# Patient Record
Sex: FEMALE | Race: WHITE | NOT HISPANIC OR LATINO | Employment: FULL TIME | URBAN - METROPOLITAN AREA
[De-identification: names, ages, dates, MRNs, and addresses within clinical notes are randomized per-mention and may not be internally consistent; named-entity substitution may affect disease eponyms.]

---

## 2017-01-01 LAB — EJECTION FRACTION: 58

## 2017-01-10 ENCOUNTER — TELEPHONE (OUTPATIENT)
Dept: OBGYN | Facility: CLINIC | Age: 32
End: 2017-01-10

## 2017-01-10 ENCOUNTER — OFFICE VISIT (OUTPATIENT)
Dept: FAMILY MEDICINE | Facility: CLINIC | Age: 32
End: 2017-01-10
Payer: COMMERCIAL

## 2017-01-10 VITALS
OXYGEN SATURATION: 100 % | WEIGHT: 131 LBS | HEIGHT: 65 IN | BODY MASS INDEX: 21.83 KG/M2 | SYSTOLIC BLOOD PRESSURE: 122 MMHG | TEMPERATURE: 98.5 F | DIASTOLIC BLOOD PRESSURE: 63 MMHG | HEART RATE: 101 BPM

## 2017-01-10 DIAGNOSIS — R19.5 LOOSE STOOLS: Primary | ICD-10-CM

## 2017-01-10 DIAGNOSIS — R10.11 ABDOMINAL PAIN, RIGHT UPPER QUADRANT: ICD-10-CM

## 2017-01-10 LAB
ALBUMIN SERPL-MCNC: 3.2 G/DL (ref 3.4–5)
ALP SERPL-CCNC: 63 U/L (ref 40–150)
ALT SERPL W P-5'-P-CCNC: 18 U/L (ref 0–50)
AST SERPL W P-5'-P-CCNC: 10 U/L (ref 0–45)
BILIRUB DIRECT SERPL-MCNC: 0.1 MG/DL (ref 0–0.2)
BILIRUB SERPL-MCNC: 0.3 MG/DL (ref 0.2–1.3)
ERYTHROCYTE [DISTWIDTH] IN BLOOD BY AUTOMATED COUNT: 13.4 % (ref 10–15)
HCT VFR BLD AUTO: 36.1 % (ref 35–47)
HGB BLD-MCNC: 12.4 G/DL (ref 11.7–15.7)
MCH RBC QN AUTO: 32.8 PG (ref 26.5–33)
MCHC RBC AUTO-ENTMCNC: 34.3 G/DL (ref 31.5–36.5)
MCV RBC AUTO: 96 FL (ref 78–100)
PLATELET # BLD AUTO: 209 10E9/L (ref 150–450)
PROT SERPL-MCNC: 7.2 G/DL (ref 6.8–8.8)
RBC # BLD AUTO: 3.78 10E12/L (ref 3.8–5.2)
WBC # BLD AUTO: 11.7 10E9/L (ref 4–11)

## 2017-01-10 PROCEDURE — 99214 OFFICE O/P EST MOD 30 MIN: CPT | Performed by: FAMILY MEDICINE

## 2017-01-10 PROCEDURE — 36415 COLL VENOUS BLD VENIPUNCTURE: CPT | Performed by: FAMILY MEDICINE

## 2017-01-10 PROCEDURE — 85027 COMPLETE CBC AUTOMATED: CPT | Performed by: FAMILY MEDICINE

## 2017-01-10 PROCEDURE — 86003 ALLG SPEC IGE CRUDE XTRC EA: CPT | Performed by: FAMILY MEDICINE

## 2017-01-10 PROCEDURE — 80076 HEPATIC FUNCTION PANEL: CPT | Performed by: FAMILY MEDICINE

## 2017-01-10 NOTE — TELEPHONE ENCOUNTER
Next 5 appointments (look out 90 days)     Jan 11, 2017 12:10 PM   MyChart OB Short with AFSHAN Conteh CNP   [facility]    [address] [facility] [address]   [phone]            Jan 19, 2017  7:30 AM   ESTABLISHED PRENATAL with Ezra Sapp MD   [facility]    [address] [facility] [address]   [phone]                Edilia Nicole APRN CNP at 1/2/2017  1:23 PM      Status: Signed         Expand All Collapse All    This could be her IBS acting up, could be from her meal over the weekend. Would recommend she see FP to evaluate. Thank you. Edilia CAVANAUGH CNP           This writer attempted to contact Mavis on 01/10/2017.    Was call answered?  No.  Left message on voicemail with information to call back to change her appt with AFSHAN Meza CNP tomorrow to see FP instead as ordered above and below. Explained Dr. Ceja does have same day appts today. Does not need to speak to RN.    If patient calls back, please Schedule appointment with primary care and cancel her appt with AFSHAN Meza CNP on 01-11-17.  If any questions can she can be transferred to speak to someone in [facility].    Also sent a MyChart msg to pt.     Yvonne Roman RN, BAN

## 2017-01-10 NOTE — TELEPHONE ENCOUNTER
----- Message from AFSHAN Conteh CNP sent at 1/10/2017  9:08 AM CST -----  Regarding: Appt Wednesday  Hey there,    This patient is scheduled for Wednesday at 12:10. In her last LiveActiont message, we had recommended she see FP for her bowel changes. Can someone please contact her and have her reschedule?    Thank you!    Edilia

## 2017-01-10 NOTE — PROGRESS NOTES
SUBJECTIVE:  Mavis Krishnamurthy, a 32 year old female scheduled an appointment to discuss the following issues:  Change in bm - orange and oil floats on top. Past week - bm changes. Seeing ob/gyn. Bright orange - NOT bloody. No changes in diet. Ate chicken strips at restaurant initially.   Three bm's past week - similar. A little nausea. Some right upper abdominal pain. No changes with bm but worse with certain position.  No fevers or chills. No sick contacts. No bm since 2 days. Usually more constipations. No stool softeners in past month. Appetite poor since pregnancy.  5 lbs weight loss. Limited dairy - lactose interance. No wheat issues in past.   Some fruits issues with rashes in past - oranges/grapefruits. Doing probiotics now recently. Taking MVI - gel cap.  History IBS. Some right side abdominal pain.   Pregnant at 23 weeks.    Past Medical History   Diagnosis Date     AR (allergic rhinitis)      Abnormal Pap smears      Mild dysplasia-2007     Abnormal Pap smear      resolved, colposcopy       Past Surgical History   Procedure Laterality Date     Exam of vagina,colposcopy       X -2     Mouth surgery  2009     [place] [facility]      Endoscopic balloon sinuplasty, optical tracking system endoscopic sinus surgery, combined  11/7/2013     Procedure: COMBINED ENDOSCOPIC BALLOON SINUPLASTY, OPTICAL TRACKING SYSTEM ENDOSCOPIC SINUS SURGERY;  Bilateral Endoscopic Ethmoidectomy, Maxillary Antrostomy, Frontal Sinusototmy with Navigation and Balloon and Propel Implants;  Surgeon: Vasquez Corona MD;  Location:  OR     Upper gi endoscopy       Colonoscopy with co2 insufflation N/A 6/18/2015     Procedure: COLONOSCOPY WITH CO2 INSUFFLATION;  Surgeon: Angel Thomas MD;  Location:  OR     Photorefractive keratectomy       Oct and Dec 2015       Family History   Problem Relation Age of Onset     Hypertension Mother      HEART DISEASE Father      open heart for endocarditis     Neurologic Disorder Father 16      "migraines     Alzheimer Disease Maternal Grandmother 80     Neurologic Disorder Paternal Grandmother      migraines unknown age     Neurologic Disorder Sister 20     migraines       Social History   Substance Use Topics     Smoking status: Never Smoker      Smokeless tobacco: Never Used     Alcohol Use: No       ROS:  OBJECTIVE:  /63 mmHg  Pulse 101  Temp(Src) 98.5  F (36.9  C) (Oral)  Ht 5' 4.5\" (1.638 m)  Wt 131 lb (59.421 kg)  BMI 22.15 kg/m2  SpO2 100%  LMP 08/03/2016 (Exact Date)  EXAM:  GENERAL APPEARANCE: healthy, alert and no distress  EYES: EOMI,  PERRL  HENT: ear canals and TM's normal and nose and mouth without ulcers or lesions  NECK: no adenopathy, no asymmetry, masses, or scars and thyroid normal to palpation  RESP: lungs clear to auscultation - no rales, rhonchi or wheezes  CV: regular rates and rhythm, normal S1 S2, no S3 or S4 and no murmur, click or rub -  ABDOMEN:  soft, nontender, no HSM or masses and bowel sounds normal  ABDOMEN: gravid.   MS: extremities normal- no gross deformities noted, no evidence of inflammation in joints, FROM in all extremities.  SKIN: no suspicious lesions or rashes  PSYCH: mentation appears normal and affect normal/bright    ASSESSMENT / PLAN:  (R19.5) Loose stools  (primary encounter diagnosis)  Comment: likely mild food interolance and ibs - complicated by pregnancy  Plan: Hepatic panel (Albumin, ALT, AST, Bili, Alk         Phos, TP), CBC with platelets, Allergy adult         food panel        Food diary. Continue mvi and eat slowly/keep well hydrated. Consider GI in put if a lot worse or weight loss. Emesis -intermittent and normal for patient. Agree with probiotics. Await labs. Expected course and warning signs reviewed. Call/email with questions/concerns. To ER if can't keep fluids down.    (R10.11) Abdominal pain, right upper quadrant  Comment: intermittent. Likely normal pregnacy- positional  Plan: Hepatic panel (Albumin, ALT, AST, Bili, Alk         " Phos, TP), CBC with platelets        Consider u/s if worse. Tylenol/positial changes prn. Expected course and warning signs reviewed. .Call/email with questions/concerns    Leo Ceja

## 2017-01-10 NOTE — NURSING NOTE
"Chief Complaint   Patient presents with     Rectal Problem       Initial /63 mmHg  Pulse 101  Temp(Src) 98.5  F (36.9  C) (Oral)  Ht 5' 4.5\" (1.638 m)  Wt 131 lb (59.421 kg)  BMI 22.15 kg/m2  SpO2 100%  LMP 08/03/2016 (Exact Date) Estimated body mass index is 22.15 kg/(m^2) as calculated from the following:    Height as of this encounter: 5' 4.5\" (1.638 m).    Weight as of this encounter: 131 lb (59.421 kg).  BP completed using cuff size: thang Mcnair CMA    "

## 2017-01-13 LAB
CLAM IGE QN: NORMAL KU(A)/L
CODFISH IGE QN: NORMAL KU(A)/L
CORN IGE QN: NORMAL KU(A)/L
COW MILK IGE QN: NORMAL KU/L
EGG WHITE IGE QN: NORMAL KU(A)/L
PEANUT IGE QN: NORMAL KU(A)/L
SCALLOP IGE QN: NORMAL KU(A)/L
SHRIMP IGE QN: NORMAL KU(A)/L
SOYBEAN IGE QN: NORMAL KU(A)/L
WALNUT IGE QN: NORMAL KU(A)/L
WHEAT IGE QN: NORMAL KU(A)/L

## 2017-01-19 ENCOUNTER — PRENATAL OFFICE VISIT (OUTPATIENT)
Dept: OBGYN | Facility: CLINIC | Age: 32
End: 2017-01-19
Payer: COMMERCIAL

## 2017-01-19 VITALS
TEMPERATURE: 97.5 F | WEIGHT: 134.6 LBS | HEART RATE: 70 BPM | SYSTOLIC BLOOD PRESSURE: 102 MMHG | OXYGEN SATURATION: 100 % | DIASTOLIC BLOOD PRESSURE: 59 MMHG | BODY MASS INDEX: 22.76 KG/M2

## 2017-01-19 DIAGNOSIS — Z34.80 SUPERVISION OF OTHER NORMAL PREGNANCY, ANTEPARTUM: ICD-10-CM

## 2017-01-19 DIAGNOSIS — Z34.80 SUPERVISION OF OTHER NORMAL PREGNANCY, ANTEPARTUM: Primary | ICD-10-CM

## 2017-01-19 DIAGNOSIS — R73.09 ABNORMAL GLUCOSE TOLERANCE TEST: Primary | ICD-10-CM

## 2017-01-19 LAB
GLUCOSE 1H P 50 G GLC PO SERPL-MCNC: 136 MG/DL (ref 60–129)
HGB BLD-MCNC: 12 G/DL (ref 11.7–15.7)

## 2017-01-19 PROCEDURE — 85018 HEMOGLOBIN: CPT | Performed by: NURSE PRACTITIONER

## 2017-01-19 PROCEDURE — 99207 ZZC PRENATAL VISIT: CPT | Performed by: OBSTETRICS & GYNECOLOGY

## 2017-01-19 PROCEDURE — 82950 GLUCOSE TEST: CPT | Performed by: NURSE PRACTITIONER

## 2017-01-19 PROCEDURE — 36415 COLL VENOUS BLD VENIPUNCTURE: CPT | Performed by: NURSE PRACTITIONER

## 2017-01-19 NOTE — NURSING NOTE
"Chief Complaint   Patient presents with     Prenatal Care     24w       Initial /59 mmHg  Pulse 70  Temp(Src) 97.5  F (36.4  C) (Oral)  Wt 134 lb 9.6 oz (61.054 kg)  SpO2 100%  LMP 08/03/2016 (Exact Date) Estimated body mass index is 22.76 kg/(m^2) as calculated from the following:    Height as of 1/10/17: 5' 4.5\" (1.638 m).    Weight as of this encounter: 134 lb 9.6 oz (61.054 kg).  BP completed using cuff size: thang Dotson CMA      "

## 2017-01-19 NOTE — MR AVS SNAPSHOT
After Visit Summary   1/19/2017    Mavis Krishnamurthy    MRN: [id number]           Patient Information     Date Of Birth          1985        Visit Information        Provider Department      1/19/2017 7:30 AM Ezra Sapp MD [facility]         Follow-ups after your visit        Who to contact     If you have questions or need follow up information about today's clinic visit or your schedule please contact [facility] directly at [phone].  Normal or non-critical lab and imaging results will be communicated to you by JumpCloudhart, letter or phone within 4 business days after the clinic has received the results. If you do not hear from us within 7 days, please contact the clinic through JumpCloudhart or phone. If you have a critical or abnormal lab result, we will notify you by phone as soon as possible.  Submit refill requests through TOMS Shoes or call your pharmacy and they will forward the refill request to us. Please allow 3 business days for your refill to be completed.          Additional Information About Your Visit        MyChart Information     T[employer] gives you secure access to your electronic health record. If you see a primary care provider, you can also send messages to your care team and make appointments. If you have questions, please call your primary care clinic.  If you do not have a primary care provider, please call [phone] and they will assist you.        Care EveryWhere ID     This is your Care EveryWhere ID. This could be used by other organizations to access your [facility] medical records  [id number]        Your Vitals Were     Pulse Temperature Pulse Oximetry Last Period          70 97.5  F (36.4  C) (Oral) 100% 08/03/2016 (Exact Date)         Blood Pressure from Last 3 Encounters:   01/19/17 102/59   01/10/17 122/63   12/21/16 115/64    Weight from Last 3 Encounters:   01/19/17 134 lb 9.6 oz (61.054 kg)   01/10/17 131 lb (59.421 kg)   12/21/16 130  lb 12.8 oz (59.33 kg)              Today, you had the following     No orders found for display       Primary Care Provider Office Phone # Fax #    Leo Ceja [phone]       [facility] [address] [facility] [address]        Thank you!     Thank you for choosing [facility]  for your care. Our goal is always to provide you with excellent care. Hearing back from our patients is one way we can continue to improve our services. Please take a few minutes to complete the written survey that you may receive in the mail after your visit with us. Thank you!             Your Updated Medication List - Protect others around you: Learn how to safely use, store and throw away your medicines at www.disposemymeds.org.          This list is accurate as of: 1/19/17  7:33 AM.  Always use your most recent med list.                   Brand Name Dispense Instructions for use    PRENATAL PO          PROBIOTIC ADVANCED Caps      Take 2 capsules by mouth daily

## 2017-01-19 NOTE — PROGRESS NOTES
Patient presents for routine prenatal visit at 24w1d.  Patient without complaint.   PE: See OB vitals  Body mass index is 22.76 kg/(m^2).    Questions asked and answered.    1 hour gtt today  Having some issues with leg edema at the end of the day  Ezra Sapp MD FACOG

## 2017-01-20 ENCOUNTER — MYC MEDICAL ADVICE (OUTPATIENT)
Dept: OBGYN | Facility: CLINIC | Age: 32
End: 2017-01-20

## 2017-01-25 DIAGNOSIS — R73.09 ABNORMAL GLUCOSE TOLERANCE TEST: ICD-10-CM

## 2017-01-25 PROCEDURE — 82951 GLUCOSE TOLERANCE TEST (GTT): CPT | Performed by: NURSE PRACTITIONER

## 2017-01-25 PROCEDURE — 82952 GTT-ADDED SAMPLES: CPT | Performed by: NURSE PRACTITIONER

## 2017-01-25 PROCEDURE — 36415 COLL VENOUS BLD VENIPUNCTURE: CPT | Performed by: NURSE PRACTITIONER

## 2017-01-26 LAB
GLUCOSE 1H P 100 G GLC PO SERPL-MCNC: 155 MG/DL (ref 60–179)
GLUCOSE 2H P 100 G GLC PO SERPL-MCNC: 116 MG/DL (ref 60–154)
GLUCOSE 3H P 100 G GLC PO SERPL-MCNC: 99 MG/DL (ref 60–139)
GLUCOSE P FAST SERPL-MCNC: 79 MG/DL (ref 60–94)

## 2017-02-10 ENCOUNTER — MYC MEDICAL ADVICE (OUTPATIENT)
Dept: OBGYN | Facility: CLINIC | Age: 32
End: 2017-02-10

## 2017-02-10 NOTE — TELEPHONE ENCOUNTER
Advised with Telephone Triage for Obstetrics & Gynecology Second Edition, Tuyet Samuels & Yas Roman RN, BAN

## 2017-02-13 ENCOUNTER — OFFICE VISIT (OUTPATIENT)
Dept: FAMILY MEDICINE | Facility: CLINIC | Age: 32
End: 2017-02-13
Payer: COMMERCIAL

## 2017-02-13 ENCOUNTER — TELEPHONE (OUTPATIENT)
Dept: NURSING | Facility: CLINIC | Age: 32
End: 2017-02-13

## 2017-02-13 VITALS
HEART RATE: 87 BPM | TEMPERATURE: 98 F | BODY MASS INDEX: 22.98 KG/M2 | SYSTOLIC BLOOD PRESSURE: 118 MMHG | DIASTOLIC BLOOD PRESSURE: 72 MMHG | OXYGEN SATURATION: 100 % | WEIGHT: 136 LBS

## 2017-02-13 DIAGNOSIS — M79.662 PAIN OF LEFT CALF: Primary | ICD-10-CM

## 2017-02-13 PROCEDURE — 99214 OFFICE O/P EST MOD 30 MIN: CPT | Performed by: FAMILY MEDICINE

## 2017-02-13 NOTE — NURSING NOTE
"Chief Complaint   Patient presents with     Pain     left calf     Vomiting     Headache       Initial /72  Pulse 87  Temp 98  F (36.7  C) (Oral)  Wt 136 lb (61.7 kg)  LMP 08/03/2016 (Exact Date)  SpO2 100%  BMI 22.98 kg/m2 Estimated body mass index is 22.98 kg/(m^2) as calculated from the following:    Height as of 1/10/17: 5' 4.5\" (1.638 m).    Weight as of this encounter: 136 lb (61.7 kg).  Medication Reconciliation: complete   Eusebia Mcnair CMA    "

## 2017-02-13 NOTE — PROGRESS NOTES
SUBJECTIVE:  Mavis Krishnamurthy, a 32 year old female scheduled an appointment to discuss the following issues:  On/off calf pain past month. Notified  ob/gyn.  Painful back of calf. No history dvt. No history baker's cyst. No major injury in past.   Lots of sitting for job. No chest pain or shortness of breath.   Compression socks and exercise and lots of water. Tylenol/ice.   Dull ache. Swelling on/off. Worse with certain positions.   No fevers or chills. Drinking lots of water and trying to get fruits/veggies in diet. No hip/back or foot pain.   No similar issues with 1st pregnancy.   Medical, social, surgical, and family histories reviewed.    ROS:    OBJECTIVE:  /72  Pulse 87  Temp 98  F (36.7  C) (Oral)  Wt 136 lb (61.7 kg)  LMP 08/03/2016 (Exact Date)  SpO2 100%  BMI 22.98 kg/m2  EXAM:  GENERAL APPEARANCE: healthy, alert and no distress  EYES: EOMI,  PERRL  HENT: ear canals and TM's normal and nose and mouth without ulcers or lesions  NECK: no adenopathy, no asymmetry, masses, or scars and thyroid normal to palpation  RESP: lungs clear to auscultation - no rales, rhonchi or wheezes  CV: regular rates and rhythm, normal S1 S2, no S3 or S4 and no murmur, click or rub -  ABDOMEN:  soft, nontender, no HSM or masses and bowel sounds normal  MS: extremities normal- no gross deformities noted, no evidence of inflammation in joints, FROM in all extremities.  MS: mild tenderness left upper calf. No swelling/cords noted and good RANGE OF MOTION .   SKIN: no suspicious lesions or rashes  NEURO: Normal strength and tone, sensory exam grossly normal, mentation intact and speech normal  PSYCH: mentation appears normal and affect normal/bright  LYMPHATICS: No inguinal nodes    ASSESSMENT / PLAN:  (M79.662) Pain of left calf  (primary encounter diagnosis)  Comment: likely strain/muscle cramp vs baker's cyst vs normal pregnancy compression vs dvt  Plan: US Lower Extremity Venous Duplex Left        U/s set-up.  Heat/ice and continue elevation/compression/tylenol. Will give T#3 if needed. To ER if a lot worse pain/swelling - (terry into thigh) or shortness of breath/ chest pain. Expected course and warning signs reviewed. Call/email with questions/concerns    Leo Ceja MD

## 2017-02-13 NOTE — MR AVS SNAPSHOT
After Visit Summary   2/13/2017    Mavis Krishnamurthy    MRN: [id number]           Patient Information     Date Of Birth          1985        Visit Information        Provider Department      2/13/2017 3:30 PM Leo Ceja MD [facility]        Today's Diagnoses     Pain of left calf    -  1       Follow-ups after your visit        Your next 10 appointments already scheduled     Feb 14, 2017  7:30 AM CST   US LOWER EXTREMITY VENOUS DUPLEX LEFT with ANDUS[facility]   [facility]    [address] [facility] [id number] [phone]           Please bring a list of your medicines (including vitamins, minerals and over-the-counter drugs). Also, tell your doctor about any allergies you may have. Wear comfortable clothes and leave your valuables at home.  You do not need to do anything special to prepare for your exam.  Please call the Imaging Department at your exam site with any questions.            Feb 17, 2017  7:30 AM CST   ESTABLISHED PRENATAL with Ezra Sapp MD   [facility]    [address] [facility] [id number] [phone]              Future tests that were ordered for you today     Open Future Orders        Priority Expected Expires Ordered    US Lower Extremity Venous Duplex Left Routine  2/13/2018 2/13/2017            Who to contact     If you have questions or need follow up information about today's clinic visit or your schedule please contact [facility] directly at [phone].  Normal or non-critical lab and imaging results will be communicated to you by MyChart, letter or phone within 4 business days after the clinic has received the results. If you do not hear from us within 7 days, please contact the clinic through MyChart or phone. If you have a critical or abnormal lab result, we will notify you by phone as soon as possible.  Submit refill requests through  [employer] Sheep or call your pharmacy and they will forward the refill request to us. Please allow 3 business days for your refill to be completed.          Additional Information About Your Visit        Sun LifeLighthart Information     F[employer] Sheep gives you secure access to your electronic health record. If you see a primary care provider, you can also send messages to your care team and make appointments. If you have questions, please call your primary care clinic.  If you do not have a primary care provider, please call [phone] and they will assist you.        Care EveryWhere ID     This is your Care EveryWhere ID. This could be used by other organizations to access your [place] medical records  [id number]        Your Vitals Were     Pulse Temperature Last Period Pulse Oximetry BMI (Body Mass Index)       87 98  F (36.7  C) (Oral) 08/03/2016 (Exact Date) 100% 22.98 kg/m2        Blood Pressure from Last 3 Encounters:   02/13/17 118/72   01/19/17 102/59   01/10/17 122/63    Weight from Last 3 Encounters:   02/13/17 136 lb (61.7 kg)   01/19/17 134 lb 9.6 oz (61.1 kg)   01/10/17 131 lb (59.4 kg)               Primary Care Provider Office Phone # Fax #    Leo Ceja [phone]       [facility] [address] [facility] [address]        Thank you!     Thank you for choosing [facility]  for your care. Our goal is always to provide you with excellent care. Hearing back from our patients is one way we can continue to improve our services. Please take a few minutes to complete the written survey that you may receive in the mail after your visit with us. Thank you!             Your Updated Medication List - Protect others around you: Learn how to safely use, store and throw away your medicines at www.disposemymeds.org.          This list is accurate as of: 2/13/17  4:10 PM.  Always use your most recent med list.                   Brand Name Dispense Instructions for use    PRENATAL  PO          PROBIOTIC ADVANCED Caps      Take 2 capsules by mouth daily

## 2017-02-13 NOTE — TELEPHONE ENCOUNTER
"Call Type: Triage Call    Presenting Problem: Mavis's  \"left calf is hurting and is swelling.\"  Symptoms are keeping Mavis up at night.   [facility] Triage/Leg  Non-Injury/disposition is to be seen within 24 hours and Mavis  agreed.  Triage Note:  Guideline Title: Leg Non-Injury  Recommended Disposition: See Provider within 24 hours  Original Inclination: Wanted to speak with a nurse  Override Disposition:  Intended Action: Call PCP/HCP  Physician Contacted: No  New swelling of legs that does NOT resolve with rest and elevation of legs ?  YES  Orthopedic hardware (metal plate, brigid or screw) newly bulging under or through  skin ? NO  Gradual onset or worsening weakness/paralysis OR inability to purposely move ? NO  Gradual onset or worsening numbness/tingling ? NO  Generalized muscle pain or aching ? NO  New marked swelling (twice the normal size as compared to usual appearance) ? NO  Painful spasms or cramping of large muscle groups (back, legs or abdomen) AND  recent heat exposure ? NO  New onset of severe pain AND change in sensation (numb, tingling, or no feeling),  change in color (pale or blue), feels cool to touch compared to other extremity.  ? NO  New onset of inability to take unassisted weight-bearing steps ? NO  Sudden onset of shortness of breath, chest pain and cough with blood tinged sputum  ? NO  New, painful cord-like swelling in calf or thigh of the leg; cord-like swelling  may feel warm or look red or discolored. ? NO  New or worsening one-sided leg swelling with pain that may be described as achy;  pain may worsen with standing or walking. ? NO  New onset of unbearable pain within last 24 hours ? NO  Extremity swelling or limitation of range of motion AND known bleeding disorder OR  taking blood thinner, chemotherapy or transplant medications ? NO  Any new OR worsening signs and symptoms of soft tissue infection ? NO  Any signs and symptoms of soft tissue infection that have not improved with " 48  hours of medical care ? NO  New unexplained weakness/paralysis, change in sensation (numbness or tingling) or  inability to purposely move, especially when one side of body is involved  occurring now or within last 4 hours ? NO  Physician Instructions:  Care Advice: Call provider if symptoms worsen or new symptoms develop.  Position affected part so it is elevated at least 12 inches (30 cm) above  level of heart to improve circulation and decrease discomfort.  CAUTIONS  SYMPTOM / CONDITION MANAGEMENT  List, or take, all current prescription(s), nonprescription or alternative  medication(s) to provider for evaluation.  LEG CARE: - Avoid prolonged sitting or standing  take a break to move around every hour or so. - Keep legs raised when  sitting, resting or sleeping  when possible raise legs above level of the heart for 20 -30 minutes. -  Flex and extend ankles for 10-12 repetitions every hour if sitting. - Do  not cross your legs. - Wear loose, non-restrictive clothing, especially  around waist, groin area and legs. - Consider using support hose if  recommended by your provider.

## 2017-02-14 ENCOUNTER — RADIANT APPOINTMENT (OUTPATIENT)
Dept: ULTRASOUND IMAGING | Facility: CLINIC | Age: 32
End: 2017-02-14
Attending: FAMILY MEDICINE
Payer: COMMERCIAL

## 2017-02-14 DIAGNOSIS — M79.662 PAIN OF LEFT CALF: ICD-10-CM

## 2017-02-14 PROCEDURE — 93971 EXTREMITY STUDY: CPT | Mod: LT

## 2017-02-17 ENCOUNTER — PRENATAL OFFICE VISIT (OUTPATIENT)
Dept: OBGYN | Facility: CLINIC | Age: 32
End: 2017-02-17
Payer: COMMERCIAL

## 2017-02-17 VITALS
HEART RATE: 82 BPM | TEMPERATURE: 97 F | OXYGEN SATURATION: 100 % | SYSTOLIC BLOOD PRESSURE: 114 MMHG | WEIGHT: 137 LBS | BODY MASS INDEX: 23.15 KG/M2 | DIASTOLIC BLOOD PRESSURE: 69 MMHG

## 2017-02-17 DIAGNOSIS — Z23 NEED FOR TDAP VACCINATION: ICD-10-CM

## 2017-02-17 DIAGNOSIS — Z34.80 SUPERVISION OF OTHER NORMAL PREGNANCY, ANTEPARTUM: Primary | ICD-10-CM

## 2017-02-17 PROCEDURE — 90471 IMMUNIZATION ADMIN: CPT | Performed by: OBSTETRICS & GYNECOLOGY

## 2017-02-17 PROCEDURE — 90715 TDAP VACCINE 7 YRS/> IM: CPT | Performed by: OBSTETRICS & GYNECOLOGY

## 2017-02-17 PROCEDURE — 99207 ZZC PRENATAL VISIT: CPT | Performed by: OBSTETRICS & GYNECOLOGY

## 2017-02-17 NOTE — PATIENT INSTRUCTIONS
If you have any questions regarding your visit, Please contact your care team.    Women s Health CLINIC HOURS TELEPHONE NUMBER   MD Page Santoyo CMA Lisa -    MATTIE Gomez RN       Monday:       7:30-4:30 [place]  Wednesday:       7:30-4:30 [place]  Thursday:       7:30-1:30 [place]  Friday:       7:30-11:30 [facility]  [address] [place]  [address]  [phone] ask for [facility]  [address] [place] [address]  [phone] ask for [facility]    Imaging Scheduling for [place]:  [phone]    Imaging Scheduling for [place]: [phone]       Urgent Care locations:    [facility] Saturday and Sunday   9 am - 5 pm    Monday-Friday   12 pm - 8 pm  Saturday and Sunday   9 am - 5 pm   [phone]     [facility] Labor and Delivery:  [phone]    If you need a medication refill, please contact your pharmacy. Please allow 3 business days for your refill to be completed.  As always, Thank you for trusting us with your healthcare needs!

## 2017-02-17 NOTE — PROGRESS NOTES
TDAP (BOOSTRIX AGES 10-64)      Date Status Dose VIS Date Route Site  Lot# Given By Verified By     2/17/2017 Given 0.5 mL 02/24/2015, Given Today IM LD [employer] [id number] Page Dotson MA --         Exp. Date NDC # Product Time Location External Comment     2/16/2019   --  --      Updated by: Page Dotson MA on 2/17/2017  7:55 AM

## 2017-02-17 NOTE — MR AVS SNAPSHOT
After Visit Summary   2/17/2017    Mavis Krishnamurthy    MRN: [id number]           Patient Information     Date Of Birth          1985        Visit Information        Provider Department      2/17/2017 7:30 AM Ezra Sapp MD [facility]        Today's Diagnoses     Supervision of other normal pregnancy, antepartum    -  1    Need for Tdap vaccination          Care Instructions                                                         If you have any questions regarding your visit, Please contact your care team.    Women s Health CLINIC HOURS TELEPHONE NUMBER   MD Page Santoyo CMA Lisa -    MATTIE Gomez RN       Monday:       7:30-4:30 [place]  Wednesday:       7:30-4:30 [place]  Thursday:       7:30-1:30 [place]  Friday:       7:30-11:30 [facility]  [address] [facility] [place]  [address]  [phone] ask for [facility]  [address] [place] [address]  [phone] ask for [facility]    Imaging Scheduling for [place]:  [phone]    Imaging Scheduling for [place]: [phone]       Urgent Care locations:    [facility] Saturday and Sunday   9 am - 5 pm    Monday-Friday   12 pm - 8 pm  Saturday and Sunday   9 am - 5 pm   [phone]     [facility] Labor and Delivery:  [phone]    If you need a medication refill, please contact your pharmacy. Please allow 3 business days for your refill to be completed.  As always, Thank you for trusting us with your healthcare needs!            Follow-ups after your visit        Follow-up notes from your care team     Return in about 2 weeks (around 3/3/2017) for Prenatal Visit.      Who to contact     If you have questions or need follow up information about today's clinic visit or your schedule please contact [facility] directly at [phone].  Normal or  non-critical lab and imaging results will be communicated to you by MyChart, letter or phone within 4 business days after the clinic has received the results. If you do not hear from us within 7 days, please contact the clinic through EARTHNET or phone. If you have a critical or abnormal lab result, we will notify you by phone as soon as possible.  Submit refill requests through EARTHNET or call your pharmacy and they will forward the refill request to us. Please allow 3 business days for your refill to be completed.          Additional Information About Your Visit        EARTHNET Information     EARTHNET gives you secure access to your electronic health record. If you see a primary care provider, you can also send messages to your care team and make appointments. If you have questions, please call your primary care clinic.  If you do not have a primary care provider, please call [phone] and they will assist you.        Care EveryWhere ID     This is your Care EveryWhere ID. This could be used by other organizations to access your [place] medical records  [id number]        Your Vitals Were     Pulse Temperature Last Period Pulse Oximetry BMI (Body Mass Index)       82 97  F (36.1  C) (Oral) 08/03/2016 (Exact Date) 100% 23.15 kg/m2        Blood Pressure from Last 3 Encounters:   02/17/17 114/69   02/13/17 118/72   01/19/17 102/59    Weight from Last 3 Encounters:   02/17/17 137 lb (62.1 kg)   02/13/17 136 lb (61.7 kg)   01/19/17 134 lb 9.6 oz (61.1 kg)              We Performed the Following     TDAP VACCINE (BOOSTRIX AGES 10-64)        Primary Care Provider Office Phone # Fax #    Leo Ceja [phone]       [facility] [address] [facility] [address]        Thank you!     Thank you for choosing [facility]  for your care. Our goal is always to provide you with excellent care. Hearing back from our patients is one way we can continue to improve our  services. Please take a few minutes to complete the written survey that you may receive in the mail after your visit with us. Thank you!             Your Updated Medication List - Protect others around you: Learn how to safely use, store and throw away your medicines at www.disposemymeds.org.          This list is accurate as of: 2/17/17  7:57 AM.  Always use your most recent med list.                   Brand Name Dispense Instructions for use    PRENATAL PO          PROBIOTIC ADVANCED Caps      Take 2 capsules by mouth daily

## 2017-02-17 NOTE — PROGRESS NOTES
Patient presents for routine prenatal visit at 28w2d.  Patient without complaint.   PE: See OB vitals  Body mass index is 23.15 kg/(m^2).    Questions asked and answered.    TDAP today  Still having left sided LE edema  Ezra Sapp MD FACOG

## 2017-02-17 NOTE — NURSING NOTE
"Chief Complaint   Patient presents with     Prenatal Care     28w2d       Initial /69 (BP Location: Right arm, Patient Position: Chair, Cuff Size: Adult Regular)  Pulse 82  Temp 97  F (36.1  C) (Oral)  Wt 137 lb (62.1 kg)  LMP 08/03/2016 (Exact Date)  SpO2 100%  BMI 23.15 kg/m2 Estimated body mass index is 23.15 kg/(m^2) as calculated from the following:    Height as of 1/10/17: 5' 4.5\" (1.638 m).    Weight as of this encounter: 137 lb (62.1 kg).  Medication Reconciliation: complete   Page Dotson CMA    Screening Questionnaire for Adult Immunization    Are you sick today?   No   Do you have allergies to medications, food, a vaccine component or latex?   No   Have you ever had a serious reaction after receiving a vaccination?   No   Do you have a long-term health problem with heart disease, lung disease, asthma, kidney disease, metabolic disease (e.g. diabetes), anemia, or other blood disorder?   Yes   Do you have cancer, leukemia, HIV/AIDS, or any other immune system problem?   No   In the past 3 months, have you taken medications that affect  your immune system, such as prednisone, other steroids, or anticancer drugs; drugs for the treatment of rheumatoid arthritis, Crohn s disease, or psoriasis; or have you had radiation treatments?   No   Have you had a seizure, or a brain or other nervous system problem?   No   During the past year, have you received a transfusion of blood or blood     products, or been given immune (gamma) globulin or antiviral drug?   No   For women: Are you pregnant or is there a chance you could become        pregnant during the next month?   Yes   Have you received any vaccinations in the past 4 weeks?   No     Immunization questionnaire was positive for at least one answer.  Notified Dr. Sapp.      MNVFC doesn't apply on this patient    Per orders of Dr. Sapp, injection of TDAP given by Page Dotson. Patient instructed to remain in clinic for 20 minutes " afterwards, and to report any adverse reaction to me immediately.       Screening performed by Page Dotson on 2/17/2017 at 7:35 AM.

## 2017-03-01 ENCOUNTER — PRENATAL OFFICE VISIT (OUTPATIENT)
Dept: OBGYN | Facility: CLINIC | Age: 32
End: 2017-03-01
Payer: COMMERCIAL

## 2017-03-01 VITALS
TEMPERATURE: 98 F | DIASTOLIC BLOOD PRESSURE: 68 MMHG | BODY MASS INDEX: 23.36 KG/M2 | WEIGHT: 140.2 LBS | SYSTOLIC BLOOD PRESSURE: 116 MMHG | HEART RATE: 73 BPM | HEIGHT: 65 IN

## 2017-03-01 DIAGNOSIS — Z34.80 SUPERVISION OF OTHER NORMAL PREGNANCY, ANTEPARTUM: Primary | ICD-10-CM

## 2017-03-01 PROCEDURE — 99207 ZZC PRENATAL VISIT: CPT | Performed by: NURSE PRACTITIONER

## 2017-03-01 RX ORDER — BREAST PUMP
1 EACH MISCELLANEOUS PRN
Qty: 1 EACH | Refills: 0 | Status: SHIPPED | OUTPATIENT
Start: 2017-03-01 | End: 2017-07-13

## 2017-03-01 ASSESSMENT — PAIN SCALES - GENERAL: PAINLEVEL: MILD PAIN (3)

## 2017-03-01 NOTE — MR AVS SNAPSHOT
After Visit Summary   3/1/2017    Mavis Krishnamurthy    MRN: [id number]           Patient Information     Date Of Birth          1985        Visit Information        Provider Department      3/1/2017 4:10 PM Edilia Nicole APRN CNP [facility]        Today's Diagnoses     Supervision of other normal pregnancy, antepartum    -  1       Follow-ups after your visit        Your next 10 appointments already scheduled     Mar 16, 2017  8:00 AM CDT   ESTABLISHED PRENATAL with Ezra Sapp MD   [facility] ([facility])    [address] [facility] [address] [phone]            Mar 30, 2017  7:30 AM CDT   ESTABLISHED PRENATAL with Ezra Sapp MD   [facility] ([facility])    [address] [facility] [address] [phone]              Who to contact     If you have questions or need follow up information about today's clinic visit or your schedule please contact [facility] directly at [phone].  Normal or non-critical lab and imaging results will be communicated to you by MeetingSense Softwarehart, letter or phone within 4 business days after the clinic has received the results. If you do not hear from us within 7 days, please contact the clinic through Senict or phone. If you have a critical or abnormal lab result, we will notify you by phone as soon as possible.  Submit refill requests through BCNX or call your pharmacy and they will forward the refill request to us. Please allow 3 business days for your refill to be completed.          Additional Information About Your Visit        MeetingSense Softwarehart Information     BCNX gives you secure access to your electronic health record. If you see a primary care provider, you can also send messages to your care team and make appointments. If you have questions, please call your primary care clinic.  If you do not have a primary care provider, please call  "[phone] and they will assist you.        Care EveryWhere ID     This is your Care EveryWhere ID. This could be used by other organizations to access your [place] medical records  [id number]        Your Vitals Were     Pulse Temperature Height Last Period BMI (Body Mass Index)       73 98  F (36.7  C) (Oral) 5' 5\" (1.651 m) 08/03/2016 (Exact Date) 23.33 kg/m2        Blood Pressure from Last 3 Encounters:   03/01/17 116/68   02/17/17 114/69   02/13/17 118/72    Weight from Last 3 Encounters:   03/01/17 140 lb 3.2 oz (63.6 kg)   02/17/17 137 lb (62.1 kg)   02/13/17 136 lb (61.7 kg)              Today, you had the following     No orders found for display         Today's Medication Changes          These changes are accurate as of: 3/1/17  4:19 PM.  If you have any questions, ask your nurse or doctor.               Start taking these medicines.        Dose/Directions    breast pump Misc   Used for:  Supervision of other normal pregnancy, antepartum   Started by:  Edilia Nicole APRN CNP        Dose:  1 each   1 each as needed   Quantity:  1 each   Refills:  0            Where to get your medicines      Some of these will need a paper prescription and others can be bought over the counter.  Ask your nurse if you have questions.     Bring a paper prescription for each of these medications     breast pump Misc                Primary Care Provider Office Phone # Fax #    Leo Ceja [phone]       [facility] [address] [facility] [address]        Thank you!     Thank you for choosing [facility]  for your care. Our goal is always to provide you with excellent care. Hearing back from our patients is one way we can continue to improve our services. Please take a few minutes to complete the written survey that you may receive in the mail after your visit with us. Thank you!             Your Updated Medication List - Protect others around you: Learn how " to safely use, store and throw away your medicines at www.disposemymeds.org.          This list is accurate as of: 3/1/17  4:19 PM.  Always use your most recent med list.                   Brand Name Dispense Instructions for use    breast pump Misc     1 each    1 each as needed       PRENATAL PO          PROBIOTIC ADVANCED Caps      Take 2 capsules by mouth daily

## 2017-03-01 NOTE — PROGRESS NOTES
Patient presents for routine prenatal visit. Prenatal flowsheet reviewed and updated as needed.  Denies vaginal bleeding, loss of fluid, contractions or cramping.  Patient without complaint. Prescription for breast pump given.  Advice as per Anticipatory Guidance/Checklist updated.  PE: See OB vitals    Questions asked and answered. Next OB visit in 2 week(s) with Dr. Sapp.    Edilia CAVANAUGH CNP

## 2017-03-01 NOTE — NURSING NOTE
"Chief Complaint   Patient presents with     Prenatal Care     30w0d       Initial /68  Pulse 73  Temp 98  F (36.7  C) (Oral)  Ht 5' 5\" (1.651 m)  Wt 140 lb 3.2 oz (63.6 kg)  LMP 08/03/2016 (Exact Date)  BMI 23.33 kg/m2 Estimated body mass index is 23.33 kg/(m^2) as calculated from the following:    Height as of this encounter: 5' 5\" (1.651 m).    Weight as of this encounter: 140 lb 3.2 oz (63.6 kg)..  BP completed using cuff size: thang Sellers CMA    "

## 2017-03-13 ENCOUNTER — MYC MEDICAL ADVICE (OUTPATIENT)
Dept: OBGYN | Facility: CLINIC | Age: 32
End: 2017-03-13

## 2017-03-13 NOTE — TELEPHONE ENCOUNTER
Reviewed meds that are on [facility] handout Taking Medicine during Pregnancy. Unsure if those listed would be stronger than Zantac.   Will route to AFSHAN Meza, CNP for review & orders. Yvonne Roman RN, BAN

## 2017-03-16 ENCOUNTER — PRENATAL OFFICE VISIT (OUTPATIENT)
Dept: OBGYN | Facility: CLINIC | Age: 32
End: 2017-03-16
Payer: COMMERCIAL

## 2017-03-16 VITALS
WEIGHT: 140.6 LBS | BODY MASS INDEX: 23.4 KG/M2 | DIASTOLIC BLOOD PRESSURE: 69 MMHG | HEART RATE: 97 BPM | OXYGEN SATURATION: 100 % | SYSTOLIC BLOOD PRESSURE: 115 MMHG

## 2017-03-16 DIAGNOSIS — Q23.81 BICUSPID AORTIC VALVE: ICD-10-CM

## 2017-03-16 DIAGNOSIS — Z34.80 SUPERVISION OF OTHER NORMAL PREGNANCY, ANTEPARTUM: Primary | ICD-10-CM

## 2017-03-16 PROCEDURE — 99207 ZZC PRENATAL VISIT: CPT | Performed by: OBSTETRICS & GYNECOLOGY

## 2017-03-16 NOTE — NURSING NOTE
"Chief Complaint   Patient presents with     Prenatal Care     32.1 weeks       Initial /69 (BP Location: Left arm, Cuff Size: Adult Regular)  Pulse 97  Wt 140 lb 9.6 oz (63.8 kg)  LMP 08/03/2016 (Exact Date)  SpO2 100%  BMI 23.4 kg/m2 Estimated body mass index is 23.4 kg/(m^2) as calculated from the following:    Height as of 3/1/17: 5' 5\" (1.651 m).    Weight as of this encounter: 140 lb 9.6 oz (63.8 kg).  Medication Reconciliation: complete   PRESTON Chávez 3/16/2017         "

## 2017-03-16 NOTE — MR AVS SNAPSHOT
After Visit Summary   3/16/2017    Mavis Krishnamurthy    MRN: [id number]           Patient Information     Date Of Birth          1985        Visit Information        Provider Department      3/16/2017 8:00 AM Ezra Sapp MD [facility]        Today's Diagnoses     Supervision of other normal pregnancy, antepartum    -  1    Bicuspid aortic valve           Follow-ups after your visit        Follow-up notes from your care team     Return in about 2 weeks (around 3/30/2017) for Prenatal Visit.      Your next 10 appointments already scheduled     Mar 30, 2017  7:30 AM CDT   ESTABLISHED PRENATAL with Ezra Sapp MD   [facility]    [address] [facility] [address] [phone]            Apr 14, 2017  8:00 AM CDT   MyChart OB Short with Ezra Sapp MD   [facility] ([facility])    [address] [facility] [address] [phone]              Who to contact     If you have questions or need follow up information about today's clinic visit or your schedule please contact [facility] directly at [phone].  Normal or non-critical lab and imaging results will be communicated to you by MyChart, letter or phone within 4 business days after the clinic has received the results. If you do not hear from us within 7 days, please contact the clinic through Striivhart or phone. If you have a critical or abnormal lab result, we will notify you by phone as soon as possible.  Submit refill requests through Pictela or call your pharmacy and they will forward the refill request to us. Please allow 3 business days for your refill to be completed.          Additional Information About Your Visit        MyChart Information     Pictela gives you secure access to your electronic health record. If you see a primary care provider, you can also send messages to your care team and make appointments.  If you have questions, please call your primary care clinic.  If you do not have a primary care provider, please call [phone] and they will assist you.        Care EveryWhere ID     This is your Care EveryWhere ID. This could be used by other organizations to access your [place] medical records  [id number]        Your Vitals Were     Pulse Last Period Pulse Oximetry BMI (Body Mass Index)          97 08/03/2016 (Exact Date) 100% 23.4 kg/m2         Blood Pressure from Last 3 Encounters:   03/16/17 115/69   03/01/17 116/68   02/17/17 114/69    Weight from Last 3 Encounters:   03/16/17 140 lb 9.6 oz (63.8 kg)   03/01/17 140 lb 3.2 oz (63.6 kg)   02/17/17 137 lb (62.1 kg)              Today, you had the following     No orders found for display       Primary Care Provider Office Phone # Fax #    Leo Ceja [phone]       [facility] [address] [facility] [address]        Thank you!     Thank you for choosing [facility]  for your care. Our goal is always to provide you with excellent care. Hearing back from our patients is one way we can continue to improve our services. Please take a few minutes to complete the written survey that you may receive in the mail after your visit with us. Thank you!             Your Updated Medication List - Protect others around you: Learn how to safely use, store and throw away your medicines at www.disposemymeds.org.          This list is accurate as of: 3/16/17  8:19 AM.  Always use your most recent med list.                   Brand Name Dispense Instructions for use    breast pump Misc     1 each    1 each as needed       PRENATAL PO          PROBIOTIC ADVANCED Caps      Take 2 capsules by mouth daily       ZANTAC PO      Take 150 mg by mouth

## 2017-03-16 NOTE — PROGRESS NOTES
Patient presents for routine prenatal visit at 32w1d.  Patient without complaint. Has had some decreased movement, discussed kick counts  PE: See OB vitals  There is no height or weight on file to calculate BMI.    Questions asked and answered.    Ezra Sapp MD FACOG

## 2017-03-30 ENCOUNTER — PRENATAL OFFICE VISIT (OUTPATIENT)
Dept: OBGYN | Facility: CLINIC | Age: 32
End: 2017-03-30
Payer: COMMERCIAL

## 2017-03-30 VITALS
TEMPERATURE: 97.7 F | WEIGHT: 139 LBS | SYSTOLIC BLOOD PRESSURE: 111 MMHG | DIASTOLIC BLOOD PRESSURE: 70 MMHG | HEART RATE: 97 BPM | BODY MASS INDEX: 23.13 KG/M2 | OXYGEN SATURATION: 99 %

## 2017-03-30 DIAGNOSIS — Z34.80 SUPERVISION OF OTHER NORMAL PREGNANCY, ANTEPARTUM: Primary | ICD-10-CM

## 2017-03-30 PROCEDURE — 99207 ZZC PRENATAL VISIT: CPT | Performed by: OBSTETRICS & GYNECOLOGY

## 2017-03-30 NOTE — MR AVS SNAPSHOT
After Visit Summary   3/30/2017    Mavis Krishnamurthy    MRN: [id number]           Patient Information     Date Of Birth          1985        Visit Information        Provider Department      3/30/2017 7:30 AM Ezra Sapp MD [facility]        Today's Diagnoses     Supervision of other normal pregnancy, antepartum    -  1      Care Instructions                                                         If you have any questions regarding your visit, Please contact your care team.    Women s Health CLINIC HOURS TELEPHONE NUMBER   MD Page Santoyo CMA Lisa -    MATTIE Gomez RN       Monday:       7:30-4:30 [place]  Wednesday:       7:30-4:30 [place]  Thursday:       7:30-1:30 [place]  Friday:       7:30-11:30 [facility]  [address] [place]  [address] [phone] ask for Women[facility]  [address]  [place], [place] [address] [phone] ask for [facility]    Imaging Scheduling for [place]:  [phone]    Imaging Scheduling for [place]: [phone]       Urgent Care locations:    [facility] Saturday and Sunday   9 am - 5 pm    Monday-Friday   12 pm - 8 pm  Saturday and Sunday   9 am - 5 pm   [phone]      Labor and Delivery:  [phone]    If you need a medication refill, please contact your pharmacy. Please allow 3 business days for your refill to be completed.  As always, Thank you for trusting us with your healthcare needs!            Follow-ups after your visit        Follow-up notes from your care team     Return in about 2 weeks (around 4/13/2017) for Prenatal Visit.      Your next 10 appointments already scheduled     Apr 14, 2017  8:00 AM CDT   [facility]    [address] [facility] [address]    [phone]            Apr 21, 2017  8:30 AM CDT   MyChart OB Short with Ezra Sapp MD   [facility]    [address]   [place] [address] [phone]            Apr 28, 2017  7:30 AM CDT   MyChart OB Short with Ezra Sapp MD   [facility]    [address]   [place] [address] [phone]            May 05, 2017  7:30 AM CDT   MyChart OB Short with Ezra Sapp MD   [facility]    [address]   [place] [address] [phone]            May 11, 2017  7:30 AM CDT   MyChart OB Short with Ezra Sapp MD   [facility]    [address]   [place] [address] [phone]              Who to contact     If you have questions or need follow up information about today's clinic visit or your schedule please contact [facility] directly at [phone].  Normal or non-critical lab and imaging results will be communicated to you by Kanvas Labshart, letter or phone within 4 business days after the clinic has received the results. If you do not hear from us within 7 days, please contact the clinic through "[employer]t or phone. If you have a critical or abnormal lab result, we will notify you by phone as soon as possible.  Submit refill requests through Splitforce or call your pharmacy and they will forward the refill request to us. Please allow 3 business days for your refill to be completed.          Additional Information About Your Visit        Kanvas LabsharSearchdaimon Information     Splitforce gives you secure access to your electronic health record. If you see a primary care provider, you can also send messages to your care team and make appointments. If you have questions, please call your primary care clinic.  If you do not have a primary care provider, please call [phone] and they will assist you.         Care EveryWhere ID     This is your Care EveryWhere ID. This could be used by other organizations to access your [place] medical records  [id number]        Your Vitals Were     Pulse Temperature Last Period Pulse Oximetry BMI (Body Mass Index)       97 97.7  F (36.5  C) (Oral) 08/03/2016 (Exact Date) 99% 23.13 kg/m2        Blood Pressure from Last 3 Encounters:   03/30/17 111/70   03/16/17 115/69   03/01/17 116/68    Weight from Last 3 Encounters:   03/30/17 139 lb (63 kg)   03/16/17 140 lb 9.6 oz (63.8 kg)   03/01/17 140 lb 3.2 oz (63.6 kg)              Today, you had the following     No orders found for display       Primary Care Provider Office Phone # Fax #    Leo Ceja [phone]       [facility] [address] [facility] [address]        Thank you!     Thank you for choosing [facility]  for your care. Our goal is always to provide you with excellent care. Hearing back from our patients is one way we can continue to improve our services. Please take a few minutes to complete the written survey that you may receive in the mail after your visit with us. Thank you!             Your Updated Medication List - Protect others around you: Learn how to safely use, store and throw away your medicines at www.disposemymeds.org.          This list is accurate as of: 3/30/17 10:05 AM.  Always use your most recent med list.                   Brand Name Dispense Instructions for use    breast pump Misc     1 each    1 each as needed       PRENATAL PO          PROBIOTIC ADVANCED Caps      Take 2 capsules by mouth daily       ZANTAC PO      Take 150 mg by mouth

## 2017-03-30 NOTE — PROGRESS NOTES
Patient presents for routine prenatal visit at 34w1d.  Patient without complaint.   PE: See OB vitals  There is no height or weight on file to calculate BMI.    Questions asked and answered.  Discussed weight loss, she will focus on increasing calories.  Ezra Sapp MD FACOG

## 2017-03-30 NOTE — NURSING NOTE
"Chief Complaint   Patient presents with     Prenatal Care     34w1d       Initial /70  Pulse 97  Temp 97.7  F (36.5  C) (Oral)  Wt 139 lb (63 kg)  LMP 08/03/2016 (Exact Date)  SpO2 99%  BMI 23.13 kg/m2 Estimated body mass index is 23.13 kg/(m^2) as calculated from the following:    Height as of 3/1/17: 5' 5\" (1.651 m).    Weight as of this encounter: 139 lb (63 kg).  Medication Reconciliation: complete   Page Dotson CMA    "

## 2017-03-30 NOTE — PATIENT INSTRUCTIONS
If you have any questions regarding your visit, Please contact your care team.    Women s Health CLINIC HOURS TELEPHONE NUMBER   MD Page Santoyo CMA Lisa -    MATTIE Gomez RN       Monday:       7:30-4:30 [place]  Wednesday:       7:30-4:30 [place]  Thursday:       7:30-1:30 [place]  Friday:       7:30-11:30 [facility]  [address] [place]  [address]  [phone] ask for Women's [facility]  [address] [place] [address]  [phone] ask for [facility]    Imaging Scheduling for [place]:  [phone]    Imaging Scheduling for [place]: [phone]       Urgent Care locations:    [facility] Saturday and Sunday   9 am - 5 pm    Monday-Friday   12 pm - 8 pm  Saturday and Sunday   9 am - 5 pm   [phone]     [facility] Labor and Delivery:  [phone]    If you need a medication refill, please contact your pharmacy. Please allow 3 business days for your refill to be completed.  As always, Thank you for trusting us with your healthcare needs!

## 2017-04-10 ENCOUNTER — TELEPHONE (OUTPATIENT)
Dept: OBGYN | Facility: CLINIC | Age: 32
End: 2017-04-10

## 2017-04-10 NOTE — TELEPHONE ENCOUNTER
Dr. Mathis signed orders for an electric breast pump.  I faxed the orders to [employer] [phone].  Aleena Washington RN

## 2017-04-12 ENCOUNTER — MYC MEDICAL ADVICE (OUTPATIENT)
Dept: OBGYN | Facility: CLINIC | Age: 32
End: 2017-04-12

## 2017-04-14 ENCOUNTER — PRENATAL OFFICE VISIT (OUTPATIENT)
Dept: OBGYN | Facility: CLINIC | Age: 32
End: 2017-04-14
Payer: COMMERCIAL

## 2017-04-14 VITALS
SYSTOLIC BLOOD PRESSURE: 108 MMHG | BODY MASS INDEX: 23.56 KG/M2 | HEART RATE: 72 BPM | DIASTOLIC BLOOD PRESSURE: 71 MMHG | TEMPERATURE: 96.6 F | OXYGEN SATURATION: 99 % | WEIGHT: 141.6 LBS

## 2017-04-14 DIAGNOSIS — O26.643 CHOLESTASIS DURING PREGNANCY IN THIRD TRIMESTER: ICD-10-CM

## 2017-04-14 DIAGNOSIS — Z34.80 SUPERVISION OF OTHER NORMAL PREGNANCY, ANTEPARTUM: Primary | ICD-10-CM

## 2017-04-14 DIAGNOSIS — L29.9 ITCHING: ICD-10-CM

## 2017-04-14 LAB
ALBUMIN SERPL-MCNC: 2.9 G/DL (ref 3.4–5)
ALP SERPL-CCNC: 140 U/L (ref 40–150)
ALT SERPL W P-5'-P-CCNC: 27 U/L (ref 0–50)
AST SERPL W P-5'-P-CCNC: 21 U/L (ref 0–45)
BILIRUB DIRECT SERPL-MCNC: 0.3 MG/DL (ref 0–0.2)
BILIRUB SERPL-MCNC: 0.7 MG/DL (ref 0.2–1.3)
PROT SERPL-MCNC: 7.2 G/DL (ref 6.8–8.8)

## 2017-04-14 PROCEDURE — 83789 MASS SPECTROMETRY QUAL/QUAN: CPT | Mod: 90 | Performed by: OBSTETRICS & GYNECOLOGY

## 2017-04-14 PROCEDURE — 36415 COLL VENOUS BLD VENIPUNCTURE: CPT | Performed by: OBSTETRICS & GYNECOLOGY

## 2017-04-14 PROCEDURE — 80076 HEPATIC FUNCTION PANEL: CPT | Performed by: OBSTETRICS & GYNECOLOGY

## 2017-04-14 PROCEDURE — 99000 SPECIMEN HANDLING OFFICE-LAB: CPT | Performed by: OBSTETRICS & GYNECOLOGY

## 2017-04-14 PROCEDURE — 99207 ZZC PRENATAL VISIT: CPT | Performed by: OBSTETRICS & GYNECOLOGY

## 2017-04-14 PROCEDURE — 87653 STREP B DNA AMP PROBE: CPT | Performed by: OBSTETRICS & GYNECOLOGY

## 2017-04-14 NOTE — NURSING NOTE
"Chief Complaint   Patient presents with     Prenatal Care     36w2d       Initial /71  Pulse 72  Temp 96.6  F (35.9  C) (Oral)  Wt 141 lb 9.6 oz (64.2 kg)  LMP 08/03/2016 (Exact Date)  SpO2 99%  BMI 23.56 kg/m2 Estimated body mass index is 23.56 kg/(m^2) as calculated from the following:    Height as of 3/1/17: 5' 5\" (1.651 m).    Weight as of this encounter: 141 lb 9.6 oz (64.2 kg).  Medication Reconciliation: complete   Page Dotson CMA      "

## 2017-04-14 NOTE — MR AVS SNAPSHOT
After Visit Summary   4/14/2017    Mavis Krishnamurthy    MRN: [id number]           Patient Information     Date Of Birth          1985        Visit Information        Provider Department      4/14/2017 8:00 AM Ezra Sapp MD [facility]        Today's Diagnoses     Supervision of other normal pregnancy, antepartum    -  1    Itching          Care Instructions                                                         If you have any questions regarding your visit, Please contact your care team.    Women s Health CLINIC HOURS TELEPHONE NUMBER   MD Page Santoyo CMA Lisa -    MATTIE Gomez RN       Monday:       7:30-4:30 [place]  Wednesday:       7:30-4:30 [place]  Thursday:       7:30-1:30 [place]  Friday:       7:30-11:30 [facility]  [address]  [address]  [phone] ask for [facility]  [address] [place] [address]  [phone] ask for [facility]    Imaging Scheduling for [place]:  [phone]    Imaging Scheduling for [place]: [phone]       Urgent Care locations:    [facility] Saturday and Sunday   9 am - 5 pm    Monday-Friday   12 pm - 8 pm  Saturday and Sunday   9 am - 5 pm   [phone]     [facility] Labor and Delivery:  [phone]    If you need a medication refill, please contact your pharmacy. Please allow 3 business days for your refill to be completed.  As always, Thank you for trusting us with your healthcare needs!            Follow-ups after your visit        Follow-up notes from your care team     Return in about 1 week (around 4/21/2017) for Prenatal Visit.      Your next 10 appointments already scheduled     Apr 21, 2017  8:30 AM CDT   [facility]    [address] [email or web address]  [address]   [phone]            Apr 28, 2017  7:30 AM CDT   MyChart OB Short with Ezra Sapp MD   [facility]    [address] [facility] [address]   [phone]            May 01, 2017  4:15 PM CDT   ESTABLISHED PRENATAL with Ezra Sapp MD   [facility]    [address] [facility] [address]   [phone]            May 11, 2017  7:30 AM CDT   MyChart OB Short with Ezra Sapp MD   [facility]    [address] [facility] [address] [phone]              Who to contact     If you have questions or need follow up information about today's clinic visit or your schedule please contact [facility] directly at [phone].  Normal or non-critical lab and imaging results will be communicated to you by Respicardiahart, letter or phone within 4 business days after the clinic has received the results. If you do not hear from us within 7 days, please contact the clinic through XG Sciencest or phone. If you have a critical or abnormal lab result, we will notify you by phone as soon as possible.  Submit refill requests through Zodio or call your pharmacy and they will forward the refill request to us. Please allow 3 business days for your refill to be completed.          Additional Information About Your Visit        Zodio Information     Zodio gives you secure access to your electronic health record. If you see a primary care provider, you can also send messages to your care team and make appointments. If you have questions, please call your primary care clinic.  If you do not have a primary care provider, please call [phone] and they will assist you.        Care EveryWhere ID     This is your Care EveryWhere ID. This could be used by other organizations to access your [place] medical records  [id number]        Your Vitals Were     Pulse  Temperature Last Period Pulse Oximetry BMI (Body Mass Index)       72 96.6  F (35.9  C) (Oral) 08/03/2016 (Exact Date) 99% 23.56 kg/m2        Blood Pressure from Last 3 Encounters:   04/14/17 108/71   03/30/17 111/70   03/16/17 115/69    Weight from Last 3 Encounters:   04/14/17 141 lb 9.6 oz (64.2 kg)   03/30/17 139 lb (63 kg)   03/16/17 140 lb 9.6 oz (63.8 kg)              We Performed the Following     Bile acids fractionated and total     Group B strep PCR     Hepatic panel (Albumin, ALT, AST, Bili, Alk Phos, TP)     US Bedside Non Radiology        Primary Care Provider Office Phone # Fax #    Leo Tariq Ceja [phone]       [facility] [address] [facility] [address]        Thank you!     Thank you for choosing [facility]  for your care. Our goal is always to provide you with excellent care. Hearing back from our patients is one way we can continue to improve our services. Please take a few minutes to complete the written survey that you may receive in the mail after your visit with us. Thank you!             Your Updated Medication List - Protect others around you: Learn how to safely use, store and throw away your medicines at www.disposemymeds.org.          This list is accurate as of: 4/14/17  9:14 AM.  Always use your most recent med list.                   Brand Name Dispense Instructions for use    breast pump Misc     1 each    1 each as needed       PRENATAL PO          PROBIOTIC ADVANCED Caps      Take 2 capsules by mouth daily       ZANTAC PO      Take 150 mg by mouth

## 2017-04-14 NOTE — PATIENT INSTRUCTIONS
If you have any questions regarding your visit, Please contact your care team.    Women s Health CLINIC HOURS TELEPHONE NUMBER   MD Page Santoyo CMA Lisa -    MATTIE Gomez RN       Monday:       7:30-4:30 [place]  Wednesday:       7:30-4:30 [place]  Thursday:       7:30-1:30 [place]  Friday:       7:30-11:30 [facility]  [address] [place]  [address]  [phone] ask for Women's [facility]  [address] [place] [address]  [phone] ask for [facility]    Imaging Scheduling for [place]:  [phone]    Imaging Scheduling for [place]: [phone]       Urgent Care locations:    [facility] Saturday and Sunday   9 am - 5 pm    Monday-Friday   12 pm - 8 pm  Saturday and Sunday   9 am - 5 pm   [phone]     [facility] Labor and Delivery:  [phone]    If you need a medication refill, please contact your pharmacy. Please allow 3 business days for your refill to be completed.  As always, Thank you for trusting us with your healthcare needs!

## 2017-04-14 NOTE — PROGRESS NOTES
Patient presents for routine prenatal visit at 36w2d.  Patient witht complaints.  She is itching on the hands and feet.  No rash.  PE: See OB vitals  Body mass index is 23.56 kg/(m^2).    Questions asked and answered.    GROUP BETA STREPTOCOCCUS Done  Transabdominal ultrasound was performed to determine presentation.  A viable intrauterine pregnancy was seen.  The fetus is noted in VERTEX .  Fetal heart motion was visualized at 145 bpm.    Normal Amniotic Fluid Volume is present.    Estimated Date of Delivery: May 10, 2017  Check lft and bile acids  OTC steroid cream and benadryl    Mavis Sapp MD FACOG

## 2017-04-15 LAB
GP B STREP DNA SPEC QL NAA+PROBE: NORMAL
SPECIMEN SOURCE: NORMAL

## 2017-04-20 ENCOUNTER — TELEPHONE (OUTPATIENT)
Dept: OBGYN | Facility: CLINIC | Age: 32
End: 2017-04-20

## 2017-04-20 PROBLEM — O26.643 CHOLESTASIS DURING PREGNANCY IN THIRD TRIMESTER: Status: ACTIVE | Noted: 2017-04-20

## 2017-04-20 RX ORDER — URSODIOL 300 MG/1
300 CAPSULE ORAL 2 TIMES DAILY
Qty: 60 CAPSULE | Refills: 0 | Status: SHIPPED | OUTPATIENT
Start: 2017-04-20 | End: 2017-05-15

## 2017-04-20 NOTE — TELEPHONE ENCOUNTER
Called patient.  I explained the medication will be addressed at the visit tomorrow.  She said it costs 300$ for 60 tablets(a 30 day supply).  I suggested possibly only purchasing 2 weeks of medication if Dr. Sapp advises this.  Patient feels general pregnancy fatigue.  Her eyes and skin are not yellow.  She does have some nausea and the itch continues.  THe option for early delivery will be discussed tomorrow.  Aleena Washington RN

## 2017-04-20 NOTE — TELEPHONE ENCOUNTER
Patient is calling stating RX: ursodiol (ACTIGALL) 300 MG capsule was $300 when spouse picked up, looking to see if there is an alternative and if she needs to take it before her appt if not she would like a call back to discuss. Thank you.

## 2017-04-21 ENCOUNTER — PRENATAL OFFICE VISIT (OUTPATIENT)
Dept: OBGYN | Facility: CLINIC | Age: 32
End: 2017-04-21
Payer: COMMERCIAL

## 2017-04-21 VITALS
WEIGHT: 143.2 LBS | HEART RATE: 88 BPM | OXYGEN SATURATION: 100 % | BODY MASS INDEX: 23.83 KG/M2 | DIASTOLIC BLOOD PRESSURE: 74 MMHG | SYSTOLIC BLOOD PRESSURE: 126 MMHG | TEMPERATURE: 97.1 F

## 2017-04-21 DIAGNOSIS — Z34.80 SUPERVISION OF OTHER NORMAL PREGNANCY, ANTEPARTUM: Primary | ICD-10-CM

## 2017-04-21 DIAGNOSIS — O26.643 CHOLESTASIS DURING PREGNANCY IN THIRD TRIMESTER: ICD-10-CM

## 2017-04-21 PROCEDURE — 59025 FETAL NON-STRESS TEST: CPT | Performed by: OBSTETRICS & GYNECOLOGY

## 2017-04-21 PROCEDURE — 99207 ZZC PRENATAL VISIT: CPT | Performed by: OBSTETRICS & GYNECOLOGY

## 2017-04-21 NOTE — PROGRESS NOTES
Patient presents for routine prenatal visit at 37w2d.  Patient with complaint. Having a lot of itching, but Ursodiol made her very dizzy.  PE: See OB vitals  There is no height or weight on file to calculate BMI.    Questions asked and answered.  In pregnancy  Cholestasis  NST IS:  Reactive (2 accl > 15 BPM in 20 min., each lasting approx. 15 seconds)  NST Baseline Rate 140's  Variability:  Average  Accelerations:Present  Variable Decelerations:Yes - describe:  Slight variable  Other Decelerations:No  Contractions: none    Further Comments:      Plans:  To LABOR AND DELIVER for induction  Ezra Sapp MD FACOG

## 2017-04-21 NOTE — PATIENT INSTRUCTIONS
If you have any questions regarding your visit, Please contact your care team.    Women s Health CLINIC HOURS TELEPHONE NUMBER   MD Page Santoyo CMA Lisa -    MATTIE Gomez RN       Monday:       7:30-4:30 [place]  Wednesday:       7:30-4:30 [place]  Thursday:       7:30-1:30 [place]  Friday:       7:30-11:30 [facility]  [address] [place]  [address]  [phone] ask for Women's [facility]  [address] [place] [address]  [phone] ask for [facility]    Imaging Scheduling for [place]:  [phone]    Imaging Scheduling for [place]: [phone]       Urgent Care locations:    [facility] Saturday and Sunday   9 am - 5 pm    Monday-Friday   12 pm - 8 pm  Saturday and Sunday   9 am - 5 pm   [phone]     [facility] Labor and Delivery:  [phone]    If you need a medication refill, please contact your pharmacy. Please allow 3 business days for your refill to be completed.  As always, Thank you for trusting us with your healthcare needs!

## 2017-04-21 NOTE — MR AVS SNAPSHOT
After Visit Summary   4/21/2017    Mavis Krishnamurthy    MRN: [id number]           Patient Information     Date Of Birth          1985        Visit Information        Provider Department      4/21/2017 8:30 AM Ezra Sapp MD [facility]        Today's Diagnoses     Supervision of other normal pregnancy, antepartum    -  1    Cholestasis during pregnancy in third trimester          Care Instructions                                                         If you have any questions regarding your visit, Please contact your care team.    Women s Health CLINIC HOURS TELEPHONE NUMBER   MD Page Santoyo CMA Lisa -    MATTIE Gomez RN       Monday:       7:30-4:30 [place]  Wednesday:       7:30-4:30 [place]  Thursday:       7:30-1:30 [place]  Friday:       7:30-11:30 [facility]  [address] [place]  [address]  [phone] ask for [facility]  [address] [place] [address]  [phone] ask for [facility]    Imaging Scheduling for [place]:  [phone]    Imaging Scheduling for [place]: [phone]       Urgent Care locations:    [facility] Saturday and Sunday   9 am - 5 pm    Monday-Friday   12 pm - 8 pm  Saturday and Sunday   9 am - 5 pm   [phone]     [facility] Labor and Delivery:  [phone]    If you need a medication refill, please contact your pharmacy. Please allow 3 business days for your refill to be completed.  As always, Thank you for trusting us with your healthcare needs!            Follow-ups after your visit        Follow-up notes from your care team     Return in about 1 week (around 4/28/2017).      Your next 10 appointments already scheduled     Apr 28, 2017  7:30 AM CDT   MyChart [facility]ICKI Wagner [facility]    [address]  [facility] [id number] [phone]            May 04, 2017  8:15 AM CDT   ESTABLISHED PRENATAL with Ezra Sapp MD   [facility] ([facility])    [address] [facility] [address] [phone]            May 11, 2017  7:30 AM CDT   MyChart OB Short with Ezra Sapp MD   [facility] ([facility])    [address] [facility] [address] [phone]              Who to contact     If you have questions or need follow up information about today's clinic visit or your schedule please contact [facility] directly at [phone].  Normal or non-critical lab and imaging results will be communicated to you by MyChart, letter or phone within 4 business days after the clinic has received the results. If you do not hear from us within 7 days, please contact the clinic through Inforamahart or phone. If you have a critical or abnormal lab result, we will notify you by phone as soon as possible.  Submit refill requests through [email or web address] or call your pharmacy and they will forward the refill request to us. Please allow 3 business days for your refill to be completed.          Additional Information About Your Visit        Inforamahart Information     [email or web address] gives you secure access to your electronic health record. If you see a primary care provider, you can also send messages to your care team and make appointments. If you have questions, please call your primary care clinic.  If you do not have a primary care provider, please call [phone] and they will assist you.        Care EveryWhere ID     This is your Care EveryWhere ID. This could be used by other organizations to access your [place] medical records  [id number]        Your Vitals Were     Pulse Temperature Last Period Pulse Oximetry BMI (Body Mass Index)       88 97.1  F (36.2  C) (Oral) 08/03/2016 (Exact Date) 100% 23.83 kg/m2        Blood Pressure from Last 3  Encounters:   04/21/17 126/74   04/14/17 108/71   03/30/17 111/70    Weight from Last 3 Encounters:   04/21/17 143 lb 3.2 oz (65 kg)   04/14/17 141 lb 9.6 oz (64.2 kg)   03/30/17 139 lb (63 kg)              We Performed the Following     FETAL NON-STRESS TEST        Primary Care Provider Office Phone # Fax #    Leo Ceja [phone]       [facility] [address] [facility] [address]        Thank you!     Thank you for choosing [facility]  for your care. Our goal is always to provide you with excellent care. Hearing back from our patients is one way we can continue to improve our services. Please take a few minutes to complete the written survey that you may receive in the mail after your visit with us. Thank you!             Your Updated Medication List - Protect others around you: Learn how to safely use, store and throw away your medicines at www.disposemymeds.org.          This list is accurate as of: 4/21/17 11:59 PM.  Always use your most recent med list.                   Brand Name Dispense Instructions for use    breast pump Misc     1 each    1 each as needed       PRENATAL PO          PROBIOTIC ADVANCED Caps      Take 2 capsules by mouth daily       ursodiol 300 MG capsule    ACTIGALL    60 capsule    Take 1 capsule (300 mg) by mouth 2 times daily       ZANTAC PO      Take 150 mg by mouth

## 2017-04-21 NOTE — NURSING NOTE
"Chief Complaint   Patient presents with     Prenatal Care     37w2d       Initial /74  Pulse 88  Temp 97.1  F (36.2  C) (Oral)  Wt 143 lb 3.2 oz (65 kg)  LMP 08/03/2016 (Exact Date)  SpO2 100%  BMI 23.83 kg/m2 Estimated body mass index is 23.83 kg/(m^2) as calculated from the following:    Height as of 3/1/17: 5' 5\" (1.651 m).    Weight as of this encounter: 143 lb 3.2 oz (65 kg).  Medication Reconciliation: complete   Page Dotson CMA      "

## 2017-04-25 ENCOUNTER — MEDICAL CORRESPONDENCE (OUTPATIENT)
Dept: HEALTH INFORMATION MANAGEMENT | Facility: CLINIC | Age: 32
End: 2017-04-25

## 2017-05-01 RX ORDER — TRIAMCINOLONE ACETONIDE 0.1 %
PASTE (GRAM) DENTAL
Qty: 5 G | Refills: 0 | Status: CANCELLED | OUTPATIENT
Start: 2017-05-01

## 2017-05-02 ENCOUNTER — OFFICE VISIT (OUTPATIENT)
Dept: URGENT CARE | Facility: URGENT CARE | Age: 32
End: 2017-05-02
Payer: COMMERCIAL

## 2017-05-02 VITALS
WEIGHT: 132 LBS | HEART RATE: 86 BPM | SYSTOLIC BLOOD PRESSURE: 117 MMHG | TEMPERATURE: 99.3 F | DIASTOLIC BLOOD PRESSURE: 73 MMHG | BODY MASS INDEX: 21.97 KG/M2

## 2017-05-02 DIAGNOSIS — S13.9XXA SPRAIN OF NECK, INITIAL ENCOUNTER: ICD-10-CM

## 2017-05-02 DIAGNOSIS — M54.2 NECK PAIN: Primary | ICD-10-CM

## 2017-05-02 PROCEDURE — 99213 OFFICE O/P EST LOW 20 MIN: CPT | Performed by: PHYSICIAN ASSISTANT

## 2017-05-02 ASSESSMENT — PAIN SCALES - GENERAL: PAINLEVEL: SEVERE PAIN (6)

## 2017-05-02 NOTE — MR AVS SNAPSHOT
After Visit Summary   5/2/2017    Mavis Krishnamurthy    MRN: [id number]           Patient Information     Date Of Birth          1985        Visit Information        Provider Department      5/2/2017 6:40 PM Nicolasa Clark PA-C [facility]        Today's Diagnoses     Neck pain    -  1    Sprain of neck, initial encounter           Follow-ups after your visit        Who to contact     If you have questions or need follow up information about today's clinic visit or your schedule please contact [facility] directly at [phone].  Normal or non-critical lab and imaging results will be communicated to you by SourceTourhart, letter or phone within 4 business days after the clinic has received the results. If you do not hear from us within 7 days, please contact the clinic through Codemediat or phone. If you have a critical or abnormal lab result, we will notify you by phone as soon as possible.  Submit refill requests through ON24 or call your pharmacy and they will forward the refill request to us. Please allow 3 business days for your refill to be completed.          Additional Information About Your Visit        MyChart Information     ON24 gives you secure access to your electronic health record. If you see a primary care provider, you can also send messages to your care team and make appointments. If you have questions, please call your primary care clinic.  If you do not have a primary care provider, please call [phone] and they will assist you.        Care EveryWhere ID     This is your Care EveryWhere ID. This could be used by other organizations to access your [place] medical records  [id number]        Your Vitals Were     Pulse Temperature Last Period BMI (Body Mass Index)          86 99.3  F (37.4  C) (Oral) 08/03/2016 (Exact Date) 21.97 kg/m2         Blood Pressure from Last 3 Encounters:   05/02/17 117/73   04/21/17 126/74   04/14/17 108/71    Weight from  Last 3 Encounters:   05/02/17 132 lb (59.9 kg)   04/21/17 143 lb 3.2 oz (65 kg)   04/14/17 141 lb 9.6 oz (64.2 kg)              Today, you had the following     No orders found for display       Primary Care Provider Office Phone # Fax #    Leo Tariq Ceja [phone]       [facility] [address] [facility] [address]        Thank you!     Thank you for choosing [facility]  for your care. Our goal is always to provide you with excellent care. Hearing back from our patients is one way we can continue to improve our services. Please take a few minutes to complete the written survey that you may receive in the mail after your visit with us. Thank you!             Your Updated Medication List - Protect others around you: Learn how to safely use, store and throw away your medicines at www.disposemymeds.org.          This list is accurate as of: 5/2/17  7:31 PM.  Always use your most recent med list.                   Brand Name Dispense Instructions for use    breast pump Misc     1 each    1 each as needed       PRENATAL PO          PROBIOTIC ADVANCED Caps      Take 2 capsules by mouth daily       ursodiol 300 MG capsule    ACTIGALL    60 capsule    Take 1 capsule (300 mg) by mouth 2 times daily       ZANTAC PO      Take 150 mg by mouth

## 2017-05-02 NOTE — NURSING NOTE
"Chief Complaint   Patient presents with     Pain     Neck Pain started today        Initial /73  Pulse 86  Temp 99.3  F (37.4  C) (Oral)  Wt 132 lb (59.9 kg)  LMP 08/03/2016 (Exact Date)  BMI 21.97 kg/m2 Estimated body mass index is 21.97 kg/(m^2) as calculated from the following:    Height as of 3/1/17: 5' 5\" (1.651 m).    Weight as of this encounter: 132 lb (59.9 kg)..  BP completed using cuff size: regular        Renetta Holliday MA      "

## 2017-05-05 NOTE — TELEPHONE ENCOUNTER
Return call to patient. Gave orders per AFSHAN Meza CNP as below. Patient agreed to follow plan and appreciative of assistance.  Phone call to pharmacy at 1227 and spoke to pharmacist, Barb. Gave orders per AFSHAN Meza CNP as below.   Yvonne Roman RN, BAN

## 2017-05-05 NOTE — TELEPHONE ENCOUNTER
If symptoms improving now and with her breast feeding, would recommend she hold off on treatment. Edilia CAVANAUGH CNP

## 2017-05-05 NOTE — TELEPHONE ENCOUNTER
"Noted patient delivered on 04-21-17.   Do not see Rx in Epic. Notes on RF from pharmacy stated Rx was previously given by dentist.  Phone call to patient. Patient stated she uses Rx prn for canker sores. Patient stated she currently has canker sore under her tongue for \"a couple of days.\" Patient stated cancer sore is getting better. She stated she tends to get them \"easily if I eat a lot of citrus.\" Verified patient is breastfeeding. Explained not sure if it is recommended that patient use while breastfeeding or not but will get her message to AFSHAN Meza, CNP and staff will call back later. Explained Dr. Sapp is not in clinic today. Verified pharmacy.     Noted per Micromedex   Category C for pregnancy and   Breast Feeding  Triamcinolone: Micromedex: Infant risk cannot be ruled out  Will route to AFSHAN Meza, CNP for review & orders. Yvonne Roman RN, BAN        "

## 2017-05-15 ENCOUNTER — OFFICE VISIT (OUTPATIENT)
Dept: FAMILY MEDICINE | Facility: CLINIC | Age: 32
End: 2017-05-15
Payer: COMMERCIAL

## 2017-05-15 VITALS
HEART RATE: 74 BPM | OXYGEN SATURATION: 98 % | WEIGHT: 130 LBS | TEMPERATURE: 97.8 F | HEIGHT: 64 IN | DIASTOLIC BLOOD PRESSURE: 69 MMHG | SYSTOLIC BLOOD PRESSURE: 102 MMHG | BODY MASS INDEX: 22.2 KG/M2

## 2017-05-15 DIAGNOSIS — R10.9 ABDOMINAL PAIN IN FEMALE PATIENT: Primary | ICD-10-CM

## 2017-05-15 LAB
ALBUMIN SERPL-MCNC: 3.7 G/DL (ref 3.4–5)
ALP SERPL-CCNC: 91 U/L (ref 40–150)
ALT SERPL W P-5'-P-CCNC: 29 U/L (ref 0–50)
ANION GAP SERPL CALCULATED.3IONS-SCNC: 6 MMOL/L (ref 3–14)
AST SERPL W P-5'-P-CCNC: 16 U/L (ref 0–45)
BASOPHILS # BLD AUTO: 0.1 10E9/L (ref 0–0.2)
BASOPHILS NFR BLD AUTO: 0.7 %
BILIRUB SERPL-MCNC: 0.5 MG/DL (ref 0.2–1.3)
BUN SERPL-MCNC: 13 MG/DL (ref 7–30)
CALCIUM SERPL-MCNC: 9.1 MG/DL (ref 8.5–10.1)
CHLORIDE SERPL-SCNC: 103 MMOL/L (ref 94–109)
CO2 SERPL-SCNC: 31 MMOL/L (ref 20–32)
CREAT SERPL-MCNC: 0.74 MG/DL (ref 0.52–1.04)
DIFFERENTIAL METHOD BLD: NORMAL
EOSINOPHIL # BLD AUTO: 0.4 10E9/L (ref 0–0.7)
EOSINOPHIL NFR BLD AUTO: 6.2 %
ERYTHROCYTE [DISTWIDTH] IN BLOOD BY AUTOMATED COUNT: 12.1 % (ref 10–15)
GFR SERPL CREATININE-BSD FRML MDRD: NORMAL ML/MIN/1.7M2
GLUCOSE SERPL-MCNC: 72 MG/DL (ref 70–99)
HCT VFR BLD AUTO: 40.5 % (ref 35–47)
HGB BLD-MCNC: 13.5 G/DL (ref 11.7–15.7)
LYMPHOCYTES # BLD AUTO: 2.1 10E9/L (ref 0.8–5.3)
LYMPHOCYTES NFR BLD AUTO: 31.7 %
MCH RBC QN AUTO: 32.1 PG (ref 26.5–33)
MCHC RBC AUTO-ENTMCNC: 33.3 G/DL (ref 31.5–36.5)
MCV RBC AUTO: 96 FL (ref 78–100)
MONOCYTES # BLD AUTO: 0.5 10E9/L (ref 0–1.3)
MONOCYTES NFR BLD AUTO: 7.7 %
NEUTROPHILS # BLD AUTO: 3.6 10E9/L (ref 1.6–8.3)
NEUTROPHILS NFR BLD AUTO: 53.7 %
PLATELET # BLD AUTO: 253 10E9/L (ref 150–450)
POTASSIUM SERPL-SCNC: 3.8 MMOL/L (ref 3.4–5.3)
PROT SERPL-MCNC: 7.7 G/DL (ref 6.8–8.8)
RBC # BLD AUTO: 4.21 10E12/L (ref 3.8–5.2)
SODIUM SERPL-SCNC: 140 MMOL/L (ref 133–144)
WBC # BLD AUTO: 6.8 10E9/L (ref 4–11)

## 2017-05-15 PROCEDURE — 36415 COLL VENOUS BLD VENIPUNCTURE: CPT | Performed by: PHYSICIAN ASSISTANT

## 2017-05-15 PROCEDURE — 99214 OFFICE O/P EST MOD 30 MIN: CPT | Performed by: PHYSICIAN ASSISTANT

## 2017-05-15 PROCEDURE — 80053 COMPREHEN METABOLIC PANEL: CPT | Performed by: PHYSICIAN ASSISTANT

## 2017-05-15 PROCEDURE — 85025 COMPLETE CBC W/AUTO DIFF WBC: CPT | Performed by: PHYSICIAN ASSISTANT

## 2017-05-15 NOTE — PROGRESS NOTES
SUBJECTIVE:                                                    Mavis Krishnamurthy is a 32 year old female who presents to clinic today for the following health issues:  New patient to me:    Patient with history of IBS, lactose intolerance, and cholestasis during pregnancy and migraines who presents with 1 week abdominal pain RLQ:        ABDOMINAL PAIN     Onset: x 1 week    Description:   Character: Sharp and Stabbing  Location: right lower quadrant pelvic region  Radiation: None    Intensity: moderate    Progression of Symptoms:  worsening    Accompanying Signs & Symptoms:  Fever/Chills?: no   Gas/Bloating: YES  Nausea: YES  Vomitting: no   Diarrhea?: YES- little bit  Constipation:no   Dysuria or Hematuria: no    History:   Trauma: no   Previous similar pain: no    Previous tests done: none    Precipitating factors:   Does the pain change with:     Food: no      BM: no     Urination: no     Alleviating factors:  none    Therapies Tried and outcome: None    LMP:  not applicable       Right near the belly button on the right side. About the size of her thumb, can pinpoint the exact location. hurts to press on it or when she moves.   Is nursing currently. Child is 3 weeks old. Normal vaginal delivery. No complications.   No h/o hernia. Has not felt a lump.   No known injury.   Twisting the the side or getting out of bed hurts the most. No pain at rest. Just pain with movement of torso or if the spot gets bumped or touched firmly per patient.   Past abdominal surgeries:  Still has appendix and gallbladder.   No change after eating.     Periods-had child 3 weeks ago, some bleeding but not having periods yet. Is breastfeeding also.       Had colonoscopy in 2015. Normal, was told to have again at age 51.       Medications- no Rx, takes prenatal and probiotic.  Vitamin D also.       Has not tried anything OTC yet. Has not tried heating pad but thought about it per patient.     No UTI symptoms.           Problem list and  histories reviewed & adjusted, as indicated.  Additional history: as documented    Patient Active Problem List   Diagnosis     CARDIOVASCULAR SCREENING; LDL GOAL LESS THAN 160     Allergic rhinitis     IBS (irritable bowel syndrome)     Chronic maxillary sinusitis     Lactose intolerance     Migraine     Abnormal antinuclear antibody titer     Bicuspid aortic valve     Need for Tdap vaccination     Supervision of other normal pregnancy, antepartum     Chlamydia infection affecting pregnancy     Cholestasis during pregnancy in third trimester     Past Surgical History:   Procedure Laterality Date     COLONOSCOPY WITH CO2 INSUFFLATION N/A 6/18/2015    Procedure: COLONOSCOPY WITH CO2 INSUFFLATION;  Surgeon: Angel Thomas MD;  Location: [facility] OR     ENDOSCOPIC BALLOON SINUPLASTY, OPTICAL TRACKING SYSTEM ENDOSCOPIC SINUS SURGERY, COMBINED  11/7/2013    Procedure: COMBINED ENDOSCOPIC BALLOON SINUPLASTY, OPTICAL TRACKING SYSTEM ENDOSCOPIC SINUS SURGERY;  Bilateral Endoscopic Ethmoidectomy, Maxillary Antrostomy, Frontal Sinusototmy with Navigation and Balloon and Propel Implants;  Surgeon: Vasquez Corona MD;  Location:  OR     EXAM OF VAGINA,COLPOSCOPY      X -2     MOUTH SURGERY  2009    [place] Teeth      PHOTOREFRACTIVE KERATECTOMY      Oct and Dec 2015     UPPER GI ENDOSCOPY         Social History   Substance Use Topics     Smoking status: Never Smoker     Smokeless tobacco: Never Used     Alcohol use No     Family History   Problem Relation Age of Onset     Hypertension Mother      HEART DISEASE Father      open heart for endocarditis     Neurologic Disorder Father 16     migraines     Alzheimer Disease Maternal Grandmother 80     Neurologic Disorder Paternal Grandmother      migraines unknown age     Neurologic Disorder Sister 20     migraines         Current Outpatient Prescriptions   Medication Sig Dispense Refill     Misc. Devices (BREAST PUMP) MISC 1 each as needed 1 each 0     Probiotic Product  "(PROBIOTIC ADVANCED) CAPS Take 2 capsules by mouth daily       Prenatal Vit-Fe Fumarate-FA (PRENATAL PO)        Allergies   Allergen Reactions     Mold      Seasonal Allergies        ROS:  Constitutional, HEENT, cardiovascular, pulmonary, GI, , musculoskeletal, neuro, skin, endocrine and psych systems are negative, except as otherwise noted.    OBJECTIVE:                                                    /69  Pulse 74  Temp 97.8  F (36.6  C) (Oral)  Ht 5' 4\" (1.626 m)  Wt 130 lb (59 kg)  LMP 08/03/2016 (Exact Date)  SpO2 98%  Breastfeeding? Unknown  BMI 22.31 kg/m2  Body mass index is 22.31 kg/(m^2).  GENERAL: healthy, alert and no distress  EYES: Eyes grossly normal to inspection, PERRL and conjunctivae and sclerae normal  HENT: ear canals and TM's normal, nose and mouth without ulcers or lesions  NECK: no adenopathy, no asymmetry, masses, or scars and thyroid normal to palpation  RESP: lungs clear to auscultation - no rales, rhonchi or wheezes  CV: regular rate and rhythm, normal S1 S2, no S3 or S4, no murmur, click or rub, no peripheral edema and peripheral pulses strong  ABDOMEN: soft, no rigidity or guarding, tender only to firm touch lateral and inferior to navel, no hepatosplenomegaly, no masses and bowel sounds normal. No mass or hernia felt with valsalva.     MS: no gross musculoskeletal defects noted, no edema  SKIN: no suspicious lesions or rashes  NEURO: Normal strength and tone, mentation intact and speech normal  PSYCH: mentation appears normal, affect normal/bright    Results for orders placed or performed in visit on 05/15/17 (from the past 24 hour(s))   CBC with platelets differential   Result Value Ref Range    WBC 6.8 4.0 - 11.0 10e9/L    RBC Count 4.21 3.8 - 5.2 10e12/L    Hemoglobin 13.5 11.7 - 15.7 g/dL    Hematocrit 40.5 35.0 - 47.0 %    MCV 96 78 - 100 fl    MCH 32.1 26.5 - 33.0 pg    MCHC 33.3 31.5 - 36.5 g/dL    RDW 12.1 10.0 - 15.0 %    Platelet Count 253 150 - 450 10e9/L "    Diff Method Automated Method     % Neutrophils 53.7 %    % Lymphocytes 31.7 %    % Monocytes 7.7 %    % Eosinophils 6.2 %    % Basophils 0.7 %    Absolute Neutrophil 3.6 1.6 - 8.3 10e9/L    Absolute Lymphocytes 2.1 0.8 - 5.3 10e9/L    Absolute Monocytes 0.5 0.0 - 1.3 10e9/L    Absolute Eosinophils 0.4 0.0 - 0.7 10e9/L    Absolute Basophils 0.1 0.0 - 0.2 10e9/L          ASSESSMENT/PLAN:                                                    ASSESSMENT / PLAN:  (R10.9) Abdominal pain in female patient  (primary encounter diagnosis)  Comment: suspect abdominal muscle strain, however could be developing hernia, see below.  No fever, exam benign, and CBC normal so low suspicion for appendicitis at this point.  Went over instructions below.     Plan: CBC with platelets differential, Comprehensive         metabolic panel, GENERAL SURG ADULT REFERRAL            Patient Instructions   Try heat topically and tylenol for pain    If pain is worsening significantly or you get fevers go to the emergency room    Try to rest abdominal muscles as much as possible    If not improving over the next week or if seeming to slowly get worse, schedule with general surgeon for abdominal pain, you may need to have imagining if that is the case        Billin min spent face-to-face with patient. 15 min on history, 5 on exam, 5 on discussing diagnosis and treatment plan.     Renetta Mejia PA-C  [facility]

## 2017-05-15 NOTE — PROGRESS NOTES
Hlega Gamble,       Your recent test results are attached, if you have any questions or concerns please feel free to contact me via e-mail or call [phone]. Kidney function normal. Blood sugar normal, no diabetes. Liver enzymes normal. Bilirubin normal.        It was a pleasure to see you at your recent office visit.      Sincerely,  Renetta Mejia PA-C

## 2017-05-15 NOTE — PATIENT INSTRUCTIONS
Try heat topically and tylenol for pain    If pain is worsening significantly or you get fevers go to the emergency room    Try to rest abdominal muscles as much as possible    If not improving over the next week or if seeming to slowly get worse, schedule with general surgeon for abdominal pain, you may need to have imagining if that is the case

## 2017-05-15 NOTE — NURSING NOTE
"Chief Complaint   Patient presents with     Abdominal Pain     Sharp pain on the R side of belly button x 1 week now and no known injury       Initial /69  Pulse 74  Temp 97.8  F (36.6  C) (Oral)  Ht 5' 4\" (1.626 m)  Wt 130 lb (59 kg)  LMP 08/03/2016 (Exact Date)  SpO2 98%  BMI 22.31 kg/m2 Estimated body mass index is 22.31 kg/(m^2) as calculated from the following:    Height as of this encounter: 5' 4\" (1.626 m).    Weight as of this encounter: 130 lb (59 kg).  Medication Reconciliation: complete      Diane Contreras MA    "

## 2017-05-15 NOTE — MR AVS SNAPSHOT
After Visit Summary   5/15/2017    Mavis Krishnamurthy    MRN: [id number]           Patient Information     Date Of Birth          1985        Visit Information        Provider Department      5/15/2017 7:00 AM Renetta Mejia PA-C [facility]        Today's Diagnoses     Abdominal pain in female patient    -  1      Care Instructions    Try heat topically and tylenol for pain    If pain is worsening significantly or you get fevers go to the emergency room    Try to rest abdominal muscles as much as possible    If not improving over the next week or if seeming to slowly get worse, schedule with general surgeon for abdominal pain, you may need to have imagining if that is the case        Follow-ups after your visit        Additional Services     GENERAL SURG ADULT REFERRAL       Your provider has referred you to: [facility]: [facility] [phone]   [email or web address]    Please be aware that coverage of these services is subject to the terms and limitations of your health insurance plan.  Call member services at your health plan with any benefit or coverage questions.      Please bring the following with you to your appointment:    (1) Any X-Rays, CTs or MRIs which have been performed.  Contact the facility where they were done to arrange for  prior to your scheduled appointment.   (2) List of current medications   (3) This referral request   (4) Any documents/labs given to you for this referral                  Who to contact     If you have questions or need follow up information about today's clinic visit or your schedule please contact [facility] directly at [phone].  Normal or non-critical lab and imaging results will be communicated to you by MyChart, letter or phone within 4 business days after the clinic has received the results. If you do not hear from us within 7 days, please contact the clinic through  "MyChart or phone. If you have a critical or abnormal lab result, we will notify you by phone as soon as possible.  Submit refill requests through [employer] or call your pharmacy and they will forward the refill request to us. Please allow 3 business days for your refill to be completed.          Additional Information About Your Visit        [employer] Information     [employer] gives you secure access to your electronic health record. If you see a primary care provider, you can also send messages to your care team and make appointments. If you have questions, please call your primary care clinic.  If you do not have a primary care provider, please call [phone] and they will assist you.        Care EveryWhere ID     This is your Care EveryWhere ID. This could be used by other organizations to access your [place] medical records  [id number]        Your Vitals Were     Pulse Temperature Height Last Period Pulse Oximetry Breastfeeding?    74 97.8  F (36.6  C) (Oral) 5' 4\" (1.626 m) 08/03/2016 (Exact Date) 98% Unknown    BMI (Body Mass Index)                   22.31 kg/m2            Blood Pressure from Last 3 Encounters:   05/15/17 102/69   05/02/17 117/73   04/21/17 126/74    Weight from Last 3 Encounters:   05/15/17 130 lb (59 kg)   05/02/17 132 lb (59.9 kg)   04/21/17 143 lb 3.2 oz (65 kg)              We Performed the Following     CBC with platelets differential     Comprehensive metabolic panel     GENERAL SURG ADULT REFERRAL        Primary Care Provider Office Phone # Fax #    Leo Ceja [phone]       [facility] [address] [address] [facility] [address]        Thank you!     Thank you for choosing [facility]  for your care. Our goal is always to provide you with excellent care. Hearing back from our patients is one way we can continue to improve our services. Please take a few minutes to complete the written survey that you may receive in the mail after your " visit with us. Thank you!             Your Updated Medication List - Protect others around you: Learn how to safely use, store and throw away your medicines at www.disposemymeds.org.          This list is accurate as of: 5/15/17  7:52 AM.  Always use your most recent med list.                   Brand Name Dispense Instructions for use    breast pump Misc     1 each    1 each as needed       PRENATAL PO          PROBIOTIC ADVANCED Caps      Take 2 capsules by mouth daily

## 2017-05-22 ENCOUNTER — MYC MEDICAL ADVICE (OUTPATIENT)
Dept: OBGYN | Facility: CLINIC | Age: 32
End: 2017-05-22

## 2017-06-05 ENCOUNTER — PRENATAL OFFICE VISIT (OUTPATIENT)
Dept: OBGYN | Facility: CLINIC | Age: 32
End: 2017-06-05
Payer: COMMERCIAL

## 2017-06-05 VITALS
HEIGHT: 65 IN | DIASTOLIC BLOOD PRESSURE: 60 MMHG | SYSTOLIC BLOOD PRESSURE: 110 MMHG | WEIGHT: 127.8 LBS | BODY MASS INDEX: 21.29 KG/M2 | HEART RATE: 70 BPM | TEMPERATURE: 97.1 F

## 2017-06-05 PROBLEM — O26.643 CHOLESTASIS DURING PREGNANCY IN THIRD TRIMESTER: Status: RESOLVED | Noted: 2017-04-20 | Resolved: 2017-06-05

## 2017-06-05 PROCEDURE — 99207 ZZC POST PARTUM EXAM: CPT | Performed by: NURSE PRACTITIONER

## 2017-06-05 ASSESSMENT — PAIN SCALES - GENERAL: PAINLEVEL: NO PAIN (0)

## 2017-06-05 NOTE — MR AVS SNAPSHOT
"              After Visit Summary   6/5/2017    Mavis Krishnamurthy    MRN: [id number]           Patient Information     Date Of Birth          1985        Visit Information        Provider Department      6/5/2017 7:50 AM Edilia Nicole APRN CNP [facility]        Today's Diagnoses     Routine postpartum follow-up    -  1       Follow-ups after your visit        Who to contact     If you have questions or need follow up information about today's clinic visit or your schedule please contact [facility] directly at [phone].  Normal or non-critical lab and imaging results will be communicated to you by [employer], letter or phone within 4 business days after the clinic has received the results. If you do not hear from us within 7 days, please contact the clinic through [employer] or phone. If you have a critical or abnormal lab result, we will notify you by phone as soon as possible.  Submit refill requests through [employer] or call your pharmacy and they will forward the refill request to us. Please allow 3 business days for your refill to be completed.          Additional Information About Your Visit        MyChart Information     [employer] gives you secure access to your electronic health record. If you see a primary care provider, you can also send messages to your care team and make appointments. If you have questions, please call your primary care clinic.  If you do not have a primary care provider, please call [phone] and they will assist you.        Care EveryWhere ID     This is your Care EveryWhere ID. This could be used by other organizations to access your [place] medical records  [id number]        Your Vitals Were     Pulse Temperature Height Last Period Breastfeeding? BMI (Body Mass Index)    70 97.1  F (36.2  C) (Oral) 5' 5\" (1.651 m) 08/03/2016 (Exact Date) Yes 21.27 kg/m2       Blood Pressure from Last 3 Encounters:   06/05/17 110/60   05/15/17 102/69   05/02/17 " 117/73    Weight from Last 3 Encounters:   06/05/17 127 lb 12.8 oz (58 kg)   05/15/17 130 lb (59 kg)   05/02/17 132 lb (59.9 kg)              Today, you had the following     No orders found for display       Primary Care Provider Office Phone # Fax #    Leo Tariq Ceja [phone]       [facility] [address] [address] [address]        Thank you!     Thank you for choosing [facility]  for your care. Our goal is always to provide you with excellent care. Hearing back from our patients is one way we can continue to improve our services. Please take a few minutes to complete the written survey that you may receive in the mail after your visit with us. Thank you!             Your Updated Medication List - Protect others around you: Learn how to safely use, store and throw away your medicines at www.disposemymeds.org.          This list is accurate as of: 6/5/17  8:22 AM.  Always use your most recent med list.                   Brand Name Dispense Instructions for use    breast pump Misc     1 each    1 each as needed       PRENATAL PO          PROBIOTIC ADVANCED Caps      Take 2 capsules by mouth daily       VITAMIN D PO      Take 5 chew tab by mouth daily

## 2017-06-05 NOTE — NURSING NOTE
"Chief Complaint   Patient presents with     Post Partum Exam       Initial /60  Pulse 70  Temp 97.1  F (36.2  C) (Oral)  Ht 5' 5\" (1.651 m)  Wt 127 lb 12.8 oz (58 kg)  LMP 08/03/2016 (Exact Date)  Breastfeeding? Yes  BMI 21.27 kg/m2 Estimated body mass index is 21.27 kg/(m^2) as calculated from the following:    Height as of this encounter: 5' 5\" (1.651 m).    Weight as of this encounter: 127 lb 12.8 oz (58 kg)..  BP completed using cuff size: thang Sellers CMA    "

## 2017-06-06 ASSESSMENT — PATIENT HEALTH QUESTIONNAIRE - PHQ9: SUM OF ALL RESPONSES TO PHQ QUESTIONS 1-9: 0

## 2017-06-09 ENCOUNTER — RADIANT APPOINTMENT (OUTPATIENT)
Dept: ULTRASOUND IMAGING | Facility: CLINIC | Age: 32
End: 2017-06-09
Attending: PHYSICIAN ASSISTANT
Payer: COMMERCIAL

## 2017-06-09 ENCOUNTER — TELEPHONE (OUTPATIENT)
Dept: FAMILY MEDICINE | Facility: CLINIC | Age: 32
End: 2017-06-09

## 2017-06-09 ENCOUNTER — OFFICE VISIT (OUTPATIENT)
Dept: FAMILY MEDICINE | Facility: CLINIC | Age: 32
End: 2017-06-09
Payer: COMMERCIAL

## 2017-06-09 VITALS
WEIGHT: 126 LBS | DIASTOLIC BLOOD PRESSURE: 68 MMHG | HEART RATE: 80 BPM | BODY MASS INDEX: 20.97 KG/M2 | OXYGEN SATURATION: 99 % | SYSTOLIC BLOOD PRESSURE: 112 MMHG | TEMPERATURE: 99 F

## 2017-06-09 DIAGNOSIS — M79.661 RIGHT CALF PAIN: ICD-10-CM

## 2017-06-09 DIAGNOSIS — R22.41 MASS OF LOWER EXTREMITY, RIGHT: Primary | ICD-10-CM

## 2017-06-09 DIAGNOSIS — M79.661 RIGHT CALF PAIN: Primary | ICD-10-CM

## 2017-06-09 PROCEDURE — 93971 EXTREMITY STUDY: CPT | Mod: RT

## 2017-06-09 PROCEDURE — 99214 OFFICE O/P EST MOD 30 MIN: CPT | Performed by: PHYSICIAN ASSISTANT

## 2017-06-09 NOTE — PROGRESS NOTES
SUBJECTIVE:                                                    Mavis Krishnamurthy is a 32 year old female who presents to clinic today for the following health issues:      Painful lump on back of  Right calf, itchy. X 2 weeks. Now hurts and cramps.  Small lump on back or right calf for approximately 2 weeks. Tender to palpation. Calf has become crampy for past 3-4 days. No significant swelling.  No erythema no ecchymosis.  She is 6 weeks post partum.   Denies cough, shortness of breath, and chest pain.   She is breast feeding.         Problem list and histories reviewed & adjusted, as indicated.  Additional history: as documented    Patient Active Problem List   Diagnosis     CARDIOVASCULAR SCREENING; LDL GOAL LESS THAN 160     Allergic rhinitis     IBS (irritable bowel syndrome)     Chronic maxillary sinusitis     Lactose intolerance     Migraine     Abnormal antinuclear antibody titer     Bicuspid aortic valve     Past Surgical History:   Procedure Laterality Date     COLONOSCOPY WITH CO2 INSUFFLATION N/A 6/18/2015    Procedure: COLONOSCOPY WITH CO2 INSUFFLATION;  Surgeon: Angel Thomas MD;  Location:  OR     ENDOSCOPIC BALLOON SINUPLASTY, OPTICAL TRACKING SYSTEM ENDOSCOPIC SINUS SURGERY, COMBINED  11/7/2013    Procedure: COMBINED ENDOSCOPIC BALLOON SINUPLASTY, OPTICAL TRACKING SYSTEM ENDOSCOPIC SINUS SURGERY;  Bilateral Endoscopic Ethmoidectomy, Maxillary Antrostomy, Frontal Sinusototmy with Navigation and Balloon and Propel Implants;  Surgeon: Vasquez Corona MD;  Location:  OR     EXAM OF VAGINA,COLPOSCOPY      X -2     MOUTH SURGERY  2009    [place] Teeth      PHOTOREFRACTIVE KERATECTOMY      Oct and Dec 2015     UPPER GI ENDOSCOPY         Social History   Substance Use Topics     Smoking status: Never Smoker     Smokeless tobacco: Never Used     Alcohol use No     Family History   Problem Relation Age of Onset     Hypertension Mother      HEART DISEASE Father      open heart for  endocarditis     Neurologic Disorder Father 16     migraines     Alzheimer Disease Maternal Grandmother 80     Neurologic Disorder Paternal Grandmother      migraines unknown age     Neurologic Disorder Sister 20     migraines         Current Outpatient Prescriptions   Medication Sig Dispense Refill     Cholecalciferol (VITAMIN D PO) Take 5 chew tab by mouth daily       Misc. Devices (BREAST PUMP) MISC 1 each as needed 1 each 0     Probiotic Product (PROBIOTIC ADVANCED) CAPS Take 2 capsules by mouth daily       Prenatal Vit-Fe Fumarate-FA (PRENATAL PO)        Allergies   Allergen Reactions     Mold      Seasonal Allergies      BP Readings from Last 3 Encounters:   06/09/17 112/68   06/05/17 110/60   05/15/17 102/69    Wt Readings from Last 3 Encounters:   06/09/17 126 lb (57.2 kg)   06/05/17 127 lb 12.8 oz (58 kg)   05/15/17 130 lb (59 kg)                    Reviewed and updated as needed this visit by clinical staff       Reviewed and updated as needed this visit by Provider         ROS:  Constitutional, cardiovascular, pulmonary, musculoskeletal and integumentary systems are negative, except as otherwise noted.    OBJECTIVE:                                                    /68  Pulse 80  Temp 99  F (37.2  C) (Oral)  Wt 126 lb (57.2 kg)  SpO2 99%  BMI 20.97 kg/m2  Body mass index is 20.97 kg/(m^2).  GENERAL: healthy, alert and no distress  RESP: lungs clear to auscultation - no rales, rhonchi or wheezes  CV: regular rate and rhythm, normal S1 S2, no S3 or S4, no murmur, click or rub, no peripheral edema and peripheral pulses strong  MS: Right calf - small, firm cyst like lesion palpated on the lateral gastrocnemius, no erythema or ecchymosis, no drainage, mildly tender to palpation, increased pain with squeezing the calf, no obvious swelling, no pitting edema, normal range of motion without pain, normal sensation, posterior tibial pulses +2 bilaterally  SKIN: no suspicious lesions or  robe    Diagnostic Test Results:  Ultrasound shows no DVT - mass appears to be an inflammatory lymph node       ASSESSMENT/PLAN:                                                    1. Right calf pain  US shows mass to be an inflammatory lymph node. No DVT seen. Advised patient to rest, ice, and elevate leg. Referral to general surgery to discuss removal if no improvement in 2 weeks.   - US Lower Extremity Venous Duplex Right; Future        Phuong Chua PA-C  [facility]

## 2017-06-09 NOTE — NURSING NOTE
"Chief Complaint   Patient presents with     Mass     painful lump on back of calf, itchy       Initial /68  Pulse 80  Temp 99  F (37.2  C) (Oral)  Wt 126 lb (57.2 kg)  SpO2 99%  BMI 20.97 kg/m2 Estimated body mass index is 20.97 kg/(m^2) as calculated from the following:    Height as of 6/5/17: 5' 5\" (1.651 m).    Weight as of this encounter: 126 lb (57.2 kg).  Medication Reconciliation: complete     Diane Shipley cma      "

## 2017-06-09 NOTE — MR AVS SNAPSHOT
After Visit Summary   6/9/2017    Mavis Krishnamurthy    MRN: [id number]           Patient Information     Date Of Birth          1985        Visit Information        Provider Department      6/9/2017 10:40 AM Phuong Chua PA-C [facility]        Today's Diagnoses     Right calf pain    -  1       Follow-ups after your visit        Who to contact     If you have questions or need follow up information about today's clinic visit or your schedule please contact [facility] directly at [phone].  Normal or non-critical lab and imaging results will be communicated to you by SensorTechhart, letter or phone within 4 business days after the clinic has received the results. If you do not hear from us within 7 days, please contact the clinic through Etubicst or phone. If you have a critical or abnormal lab result, we will notify you by phone as soon as possible.  Submit refill requests through Meetyl or call your pharmacy and they will forward the refill request to us. Please allow 3 business days for your refill to be completed.          Additional Information About Your Visit        MyChart Information     Meetyl gives you secure access to your electronic health record. If you see a primary care provider, you can also send messages to your care team and make appointments. If you have questions, please call your primary care clinic.  If you do not have a primary care provider, please call [phone] and they will assist you.        Care EveryWhere ID     This is your Care EveryWhere ID. This could be used by other organizations to access your [place] medical records  [id number]        Your Vitals Were     Pulse Temperature Pulse Oximetry BMI (Body Mass Index)          80 99  F (37.2  C) (Oral) 99% 20.97 kg/m2         Blood Pressure from Last 3 Encounters:   06/09/17 112/68   06/05/17 110/60   05/15/17 102/69    Weight from Last 3 Encounters:   06/09/17 126 lb (57.2 kg)    06/05/17 127 lb 12.8 oz (58 kg)   05/15/17 130 lb (59 kg)               Primary Care Provider Office Phone # Fax #    Leo Ceja [phone]       [facility] [address] [facility] [address]        Thank you!     Thank you for choosing [facility]  for your care. Our goal is always to provide you with excellent care. Hearing back from our patients is one way we can continue to improve our services. Please take a few minutes to complete the written survey that you may receive in the mail after your visit with us. Thank you!             Your Updated Medication List - Protect others around you: Learn how to safely use, store and throw away your medicines at www.disposemymeds.org.          This list is accurate as of: 6/9/17  2:41 PM.  Always use your most recent med list.                   Brand Name Dispense Instructions for use    breast pump Misc     1 each    1 each as needed       PRENATAL PO          PROBIOTIC ADVANCED Caps      Take 2 capsules by mouth daily       VITAMIN D PO      Take 5 chew tab by mouth daily

## 2017-06-09 NOTE — TELEPHONE ENCOUNTER
Please inform patient the radiologist did not see any signs of a clot. They believe the mass on your calf is a inflammatory lymph node. I recommend to rest, ice, and elevate the calf often. May take ibuprofen for pain. If the mass is not resolving within the next 2 weeks, I recommend follow up with general surgery to discuss removal of the mass. A referral has been placed. Please give her the information for general surgery. Thank you.    Phuong Chua PA-C

## 2017-06-09 NOTE — TELEPHONE ENCOUNTER
Called patient and gave providers message/ instructions.  Patient understands this.   Patient requested general surgery referral info be MyCharted to her so she has it if she needs it.    Lexy Mccurdy RN

## 2017-07-10 ENCOUNTER — TRANSFERRED RECORDS (OUTPATIENT)
Dept: HEALTH INFORMATION MANAGEMENT | Facility: CLINIC | Age: 32
End: 2017-07-10

## 2017-07-12 NOTE — PROGRESS NOTES
"SUBJECTIVE:                                                    Mavis Krishnamurthy is a 32 year old female who presents to clinic today for the following health issues:    Lump      Duration: 2 weeks or so    Description (location/character/radiation): feels like she has a lump in her throat which makes her throat scratchy and slightly sore     Intensity:  Variable     Accompanying signs and symptoms: ears feel plugged/itchy/draining, some nasal congestion     History (similar episodes/previous evaluation): None    Precipitating or alleviating factors: None    Therapies tried and outcome: otc heartburn medication and allergy medication - sx's continue   Feels \"raw\" in the am. By evening then feels like there is a lump in throat.   Has noticed more burping.   History of sinus surgery. Recent posterior nasal drainage symptoms also.   As tried flonase, antihistamine and zantac and 2 wks of omeprazole.     Problem list and histories reviewed & adjusted, as indicated.  Additional history: as documented    Patient Active Problem List   Diagnosis     CARDIOVASCULAR SCREENING; LDL GOAL LESS THAN 160     Allergic rhinitis     IBS (irritable bowel syndrome)     Chronic maxillary sinusitis     Lactose intolerance     Migraine     Abnormal antinuclear antibody titer     Bicuspid aortic valve     Past Surgical History:   Procedure Laterality Date     COLONOSCOPY WITH CO2 INSUFFLATION N/A 6/18/2015    Procedure: COLONOSCOPY WITH CO2 INSUFFLATION;  Surgeon: Angel Thomas MD;  Location:  OR     ENDOSCOPIC BALLOON SINUPLASTY, OPTICAL TRACKING SYSTEM ENDOSCOPIC SINUS SURGERY, COMBINED  11/7/2013    Procedure: COMBINED ENDOSCOPIC BALLOON SINUPLASTY, OPTICAL TRACKING SYSTEM ENDOSCOPIC SINUS SURGERY;  Bilateral Endoscopic Ethmoidectomy, Maxillary Antrostomy, Frontal Sinusototmy with Navigation and Balloon and Propel Implants;  Surgeon: Vasquez Corona MD;  Location:  OR     EXAM OF VAGINA,COLPOSCOPY      X -2     MOUTH " SURGERY  2009    [place] [facility]      PHOTOREFRACTIVE KERATECTOMY      Oct and Dec 2015     UPPER GI ENDOSCOPY         Social History   Substance Use Topics     Smoking status: Never Smoker     Smokeless tobacco: Never Used     Alcohol use No     Family History   Problem Relation Age of Onset     Hypertension Mother      HEART DISEASE Father      open heart for endocarditis     Neurologic Disorder Father 16     migraines     Alzheimer Disease Maternal Grandmother 80     Neurologic Disorder Paternal Grandmother      migraines unknown age     Neurologic Disorder Sister 20     migraines         Current Outpatient Prescriptions   Medication Sig Dispense Refill     Cholecalciferol (VITAMIN D PO) Take 5 chew tab by mouth daily       Probiotic Product (PROBIOTIC ADVANCED) CAPS Take 2 capsules by mouth daily       Prenatal Vit-Fe Fumarate-FA (PRENATAL PO)        Allergies   Allergen Reactions     Mold      Seasonal Allergies        ROS:  Constitutional, HEENT, cardiovascular, pulmonary, gi and gu systems are negative, except as otherwise noted.    OBJECTIVE:     /66  Pulse 66  Temp 97.2  F (36.2  C) (Oral)  Wt 128 lb 3.2 oz (58.2 kg)  SpO2 100%  BMI 21.33 kg/m2  Body mass index is 21.33 kg/(m^2).  GENERAL: healthy, alert and no distress  Head: Normocephalic, atraumatic.  Eyes: Conjunctiva clear, non icteric. PERRLA.  Ears: External ears and TMs normal BL.  Nose: Septum midline, nasal mucosa pink and moist. No discharge.  Mouth / Throat: Normal dentition.  No oral lesions. Pharynx non erythematous, tonsils without hypertrophy.  Neck: Supple, no enlarged LN, trachea midline.  Heart: S1 and S2 normal, no murmurs, clicks, gallops or rubs. Regular rate and rhythm.  Chest: Clear; no wheezes or rales.  The abdomen is soft without tenderness, guarding, mass, rebound or organomegaly. Bowel sounds are normal.    ASSESSMENT/PLAN:         ICD-10-CM    1. Throat irritation J39.2 OTOLARYNGOLOGY REFERRAL   possible GERD vs  posterior nasal drainage  con't omeprazole  Recheck with ENT in 2 wks if not improving  warning signs discussed.     Sohan Flannery PA-C  [facility]

## 2017-07-13 ENCOUNTER — OFFICE VISIT (OUTPATIENT)
Dept: FAMILY MEDICINE | Facility: CLINIC | Age: 32
End: 2017-07-13
Payer: COMMERCIAL

## 2017-07-13 VITALS
HEART RATE: 66 BPM | DIASTOLIC BLOOD PRESSURE: 66 MMHG | TEMPERATURE: 97.2 F | SYSTOLIC BLOOD PRESSURE: 108 MMHG | WEIGHT: 128.2 LBS | BODY MASS INDEX: 21.33 KG/M2 | OXYGEN SATURATION: 100 %

## 2017-07-13 DIAGNOSIS — J39.2 THROAT IRRITATION: Primary | ICD-10-CM

## 2017-07-13 PROCEDURE — 99213 OFFICE O/P EST LOW 20 MIN: CPT | Performed by: PHYSICIAN ASSISTANT

## 2017-07-13 NOTE — PATIENT INSTRUCTIONS
Heartburn/GERD   What is heartburn?   Heartburn refers to the symptoms you feel when acids in your stomach flow back into the esophagus. (The esophagus is the tube that carries food from your throat to your stomach.) This backward movement of stomach acid is called reflux. The acid can burn and irritate the esophagus, throat, and vocal cords.   Heartburn is a common problem. Despite its name, it has nothing to do with the heart.   When you have heartburn often, you may have a condition called gastroesophageal reflux disease, or GERD.   How does it occur?   At the bottom of the esophagus there is a ring of muscle called a sphincter. It acts like a valve. When you swallow food, the sphincter opens to let the food pass into the stomach. The ring then closes to keep the stomach contents from going back into the esophagus. If the sphincter is weak or too relaxed, stomach acid and food flow backward into the esophagus. Because the esophagus does not have the protective lining that the stomach has, the acid causes pain.   The sphincter muscle sometimes does not work properly if:   You are overweight.   You are pregnant.   You have a hiatal hernia (a condition in which part of the stomach protrudes through the diaphragm into the chest).   You eat too much.   You lie down soon after eating.   You wear tight clothes that push on your stomach.   Foods that may make heartburn worse are:   foods high in fat   sugar   chocolate   peppermint   onions   citrus foods such as orange juice   tomato-based foods   spicy foods   coffee and other drinks with caffeine, such as tea and henry   alcohol.   Heartburn can also be made worse by:   taking certain medicines, such as aspirin   smoking cigarettes.   Anyone can have an attack of heartburn from overeating or eating foods that are high in acid. Most of the time heartburn is mild and lasts for a short time. There is usually not a problem when heartburn occurs just once in a  while. You should see your healthcare provider if:   You have heartburn nearly every day for 2 weeks.   The heartburn comes back when the antacid wears off.   Heartburn wakes you up at night.   What are the symptoms?   The main symptom of heartburn is a burning pain in the lower chest, usually close to the bottom of the breastbone. Other symptoms you may have are:   acid or sour taste in your mouth   belching   a feeling of bloating or fullness in the stomach.   These symptoms tend to happen after very large meals and especially with activity such as bending or lifting after meals. The symptoms may be made worse by lying down or by wearing tight clothing.   Heartburn is very common during the last few months of pregnancy. The weight of the baby pushes on the stomach and can cause the sphincter to relax and let acid to flow back into the esophagus.   How is it diagnosed?   Usually heartburn can be diagnosed from your medical history.   If there is any question about the diagnosis, you may have the following tests to check for ulcers or other problems that might cause your symptoms:   barium swallow X-ray study of the esophagus   complete upper GI (gastrointestinal) barium X-ray study of the esophagus, stomach, and upper intestine   endoscopy, a procedure in which a thin flexible tube with a tiny camera is placed in your mouth and down into your stomach so your provider can see your esophagus and stomach.   How is it treated?   To help reduce the symptoms of heartburn you can:   Try not to put a lot of pressure on the sphincter muscle. Eating light meals and wearing loose clothing will help.   Lose weight if you are overweight.   Take nonprescription antacids (tablets or liquid) after meals and at bedtime.   Raise the head of your bed or use more than one pillow so your head is higher than your stomach. This may allow gravity to help keep food from backing up.   If you find that certain foods or drinks seem to cause  your symptoms or make them worse, avoid those foods.   If the simple measures described above do not relieve the symptoms, your healthcare provider may prescribe medicine. The prescription medicines help reduce stomach acid. They also help stomach emptying. A very few people who are not helped with medicines may need surgery.   Get emergency care if the following symptoms occur with the heartburn and do not go away within 15 minutes of treatment for heartburn: shortness of breath; sweating; light-headedness, weakness; or jaw, arm, back, or chest pain.   How long will the effects last?   Heartburn symptoms are usually relieved by treatment in just a few hours or less. If you are having heartburn every day, starting treatment will usually relieve the symptoms in a few days. However, the symptoms may come back from time to time, especially if you gain weight.   Heartburn can sometimes make asthma worse. If you have asthma, preventing or controlling heartburn may help control your asthma symptoms.   How can I help prevent heartburn?   The best prevention is to:   Keep a healthy weight. Lose weight if you are overweight.   Sleep with your head elevated at least 4 to 6 inches. (It's usually most comfortable to put the head of your bed on blocks.)   It may also help if you:   Wait an hour or longer after eating before you lie down. If you have to lie down after a meal, lie on your left side. Keep your head and shoulders slightly higher than the rest of your body. It's best to not eat for 2 to 3 hours before you go to bed.   Eat smaller, more frequent meals.   Avoid wearing tight clothing or belts.   Don't smoke. Smoking relaxes the sphincter leading to your stomach.   Avoid foods and other things that seem to cause heartburn or make it worse.   Developed by [employer].   Published by [employer].   Last modified: 2009-01-14   Last reviewed: 2008-12-02

## 2017-07-13 NOTE — MR AVS SNAPSHOT
After Visit Summary   7/13/2017    Mavis Krishnamurthy    MRN: [id number]           Patient Information     Date Of Birth          1985        Visit Information        Provider Department      7/13/2017 7:00 AM Sohan Flannery PA-C [facility]        Today's Diagnoses     Throat irritation    -  1      Care Instructions                Heartburn/GERD   What is heartburn?   Heartburn refers to the symptoms you feel when acids in your stomach flow back into the esophagus. (The esophagus is the tube that carries food from your throat to your stomach.) This backward movement of stomach acid is called reflux. The acid can burn and irritate the esophagus, throat, and vocal cords.   Heartburn is a common problem. Despite its name, it has nothing to do with the heart.   When you have heartburn often, you may have a condition called gastroesophageal reflux disease, or GERD.   How does it occur?   At the bottom of the esophagus there is a ring of muscle called a sphincter. It acts like a valve. When you swallow food, the sphincter opens to let the food pass into the stomach. The ring then closes to keep the stomach contents from going back into the esophagus. If the sphincter is weak or too relaxed, stomach acid and food flow backward into the esophagus. Because the esophagus does not have the protective lining that the stomach has, the acid causes pain.   The sphincter muscle sometimes does not work properly if:   You are overweight.   You are pregnant.   You have a hiatal hernia (a condition in which part of the stomach protrudes through the diaphragm into the chest).   You eat too much.   You lie down soon after eating.   You wear tight clothes that push on your stomach.   Foods that may make heartburn worse are:   foods high in fat   sugar   chocolate   peppermint   onions   citrus foods such as orange juice   tomato-based foods   spicy foods   coffee and other drinks with caffeine, such as  tea and henry   alcohol.   Heartburn can also be made worse by:   taking certain medicines, such as aspirin   smoking cigarettes.   Anyone can have an attack of heartburn from overeating or eating foods that are high in acid. Most of the time heartburn is mild and lasts for a short time. There is usually not a problem when heartburn occurs just once in a while. You should see your healthcare provider if:   You have heartburn nearly every day for 2 weeks.   The heartburn comes back when the antacid wears off.   Heartburn wakes you up at night.   What are the symptoms?   The main symptom of heartburn is a burning pain in the lower chest, usually close to the bottom of the breastbone. Other symptoms you may have are:   acid or sour taste in your mouth   belching   a feeling of bloating or fullness in the stomach.   These symptoms tend to happen after very large meals and especially with activity such as bending or lifting after meals. The symptoms may be made worse by lying down or by wearing tight clothing.   Heartburn is very common during the last few months of pregnancy. The weight of the baby pushes on the stomach and can cause the sphincter to relax and let acid to flow back into the esophagus.   How is it diagnosed?   Usually heartburn can be diagnosed from your medical history.   If there is any question about the diagnosis, you may have the following tests to check for ulcers or other problems that might cause your symptoms:   barium swallow X-ray study of the esophagus   complete upper GI (gastrointestinal) barium X-ray study of the esophagus, stomach, and upper intestine   endoscopy, a procedure in which a thin flexible tube with a tiny camera is placed in your mouth and down into your stomach so your provider can see your esophagus and stomach.   How is it treated?   To help reduce the symptoms of heartburn you can:   Try not to put a lot of pressure on the sphincter muscle. Eating light meals and wearing  loose clothing will help.   Lose weight if you are overweight.   Take nonprescription antacids (tablets or liquid) after meals and at bedtime.   Raise the head of your bed or use more than one pillow so your head is higher than your stomach. This may allow gravity to help keep food from backing up.   If you find that certain foods or drinks seem to cause your symptoms or make them worse, avoid those foods.   If the simple measures described above do not relieve the symptoms, your healthcare provider may prescribe medicine. The prescription medicines help reduce stomach acid. They also help stomach emptying. A very few people who are not helped with medicines may need surgery.   Get emergency care if the following symptoms occur with the heartburn and do not go away within 15 minutes of treatment for heartburn: shortness of breath; sweating; light-headedness, weakness; or jaw, arm, back, or chest pain.   How long will the effects last?   Heartburn symptoms are usually relieved by treatment in just a few hours or less. If you are having heartburn every day, starting treatment will usually relieve the symptoms in a few days. However, the symptoms may come back from time to time, especially if you gain weight.   Heartburn can sometimes make asthma worse. If you have asthma, preventing or controlling heartburn may help control your asthma symptoms.   How can I help prevent heartburn?   The best prevention is to:   Keep a healthy weight. Lose weight if you are overweight.   Sleep with your head elevated at least 4 to 6 inches. (It's usually most comfortable to put the head of your bed on blocks.)   It may also help if you:   Wait an hour or longer after eating before you lie down. If you have to lie down after a meal, lie on your left side. Keep your head and shoulders slightly higher than the rest of your body. It's best to not eat for 2 to 3 hours before you go to bed.   Eat smaller, more frequent meals.   Avoid wearing  tight clothing or belts.   Don't smoke. Smoking relaxes the sphincter leading to your stomach.   Avoid foods and other things that seem to cause heartburn or make it worse.   Developed by [employer].   Published by [employer].   Last modified: 2009-01-14   Last reviewed: 2008-12-02            Follow-ups after your visit        Who to contact     If you have questions or need follow up information about today's clinic visit or your schedule please contact [facility] directly at [phone].  Normal or non-critical lab and imaging results will be communicated to you by [employer]art, letter or phone within 4 business days after the clinic has received the results. If you do not hear from us within 7 days, please contact the clinic through Smisht or phone. If you have a critical or abnormal lab result, we will notify you by phone as soon as possible.  Submit refill requests through Plexisoft or call your pharmacy and they will forward the refill request to us. Please allow 3 business days for your refill to be completed.          Additional Information About Your Visit        [employer] Information     Plexisoft gives you secure access to your electronic health record. If you see a primary care provider, you can also send messages to your care team and make appointments. If you have questions, please call your primary care clinic.  If you do not have a primary care provider, please call [phone] and they will assist you.        Care EveryWhere ID     This is your Care EveryWhere ID. This could be used by other organizations to access your [place] medical records  [id number]        Your Vitals Were     Pulse Temperature Pulse Oximetry BMI (Body Mass Index)          66 97.2  F (36.2  C) (Oral) 100% 21.33 kg/m2         Blood Pressure from Last 3 Encounters:   07/13/17 108/66   06/09/17 112/68   06/05/17 110/60    Weight from Last 3 Encounters:   07/13/17 128 lb 3.2 oz (58.2 kg)   06/09/17 126 lb (57.2 kg)    06/05/17 127 lb 12.8 oz (58 kg)              Today, you had the following     No orders found for display       Primary Care Provider Office Phone # Fax #    Leo Ceja [phone]       [facility] [address] [facility] [address]        Equal Access to Services     [facility] : Hadii aad ku hadasho Soomaali, waaxda luqadaha, qaybta kaalmada adeegyada, waxay idiin hayaan adeeg kharash la'aan . So [facility] [phone].    ATENCIÓN: Si habla español, tiene a restrepo disposición servicios gratuitos de asistencia lingüística. Rajani al [phone].    We comply with applicable federal civil rights laws and [place] laws. We do not discriminate on the basis of race, color, national origin, age, disability sex, sexual orientation or gender identity.            Thank you!     Thank you for choosing [facility]  for your care. Our goal is always to provide you with excellent care. Hearing back from our patients is one way we can continue to improve our services. Please take a few minutes to complete the written survey that you may receive in the mail after your visit with us. Thank you!             Your Updated Medication List - Protect others around you: Learn how to safely use, store and throw away your medicines at www.disposemymeds.org.          This list is accurate as of: 7/13/17  7:14 AM.  Always use your most recent med list.                   Brand Name Dispense Instructions for use Diagnosis    PRENATAL PO           PROBIOTIC ADVANCED Caps      Take 2 capsules by mouth daily        VITAMIN D PO      Take 5 chew tab by mouth daily

## 2017-07-13 NOTE — NURSING NOTE
"Chief Complaint   Patient presents with     Mass       Initial /66  Pulse 66  Temp 97.2  F (36.2  C) (Oral)  Wt 128 lb 3.2 oz (58.2 kg)  SpO2 100%  BMI 21.33 kg/m2 Estimated body mass index is 21.33 kg/(m^2) as calculated from the following:    Height as of 6/5/17: 5' 5\" (1.651 m).    Weight as of this encounter: 128 lb 3.2 oz (58.2 kg).  Medication Reconciliation: complete  Mrayellen Aquino CMA    "

## 2017-07-14 ENCOUNTER — OFFICE VISIT (OUTPATIENT)
Dept: OTOLARYNGOLOGY | Facility: CLINIC | Age: 32
End: 2017-07-14
Payer: COMMERCIAL

## 2017-07-14 VITALS — BODY MASS INDEX: 21.33 KG/M2 | RESPIRATION RATE: 16 BRPM | HEIGHT: 65 IN | WEIGHT: 128 LBS

## 2017-07-14 DIAGNOSIS — R09.A2 GLOBUS SENSATION: ICD-10-CM

## 2017-07-14 DIAGNOSIS — K21.9 LPRD (LARYNGOPHARYNGEAL REFLUX DISEASE): Primary | ICD-10-CM

## 2017-07-14 PROCEDURE — 99243 OFF/OP CNSLTJ NEW/EST LOW 30: CPT | Performed by: OTOLARYNGOLOGY

## 2017-07-14 ASSESSMENT — PAIN SCALES - GENERAL: PAINLEVEL: NO PAIN (0)

## 2017-07-14 NOTE — NURSING NOTE
"Chief Complaint   Patient presents with     Throat Problem     Irritation. GERD vs post nasal drainage       Initial Resp 16  Ht 1.651 m (5' 5\")  Wt 58.1 kg (128 lb)  BMI 21.3 kg/m2 Estimated body mass index is 21.3 kg/(m^2) as calculated from the following:    Height as of this encounter: 1.651 m (5' 5\").    Weight as of this encounter: 58.1 kg (128 lb).  Medication Reconciliation: complete     Elda Serrano MA    "

## 2017-07-14 NOTE — PATIENT INSTRUCTIONS
General Scheduling Information  To schedule your CT/MRI scan, please contact [place] [facility] at [phone]   [address] [place] [address]    To schedule your Surgery, please contact our Specialty Schedulers at [phone]    ENT Clinic Locations Clinic Hours Telephone Number     [facility]  [address] [place] [address]   Tuesday:       8:00am -- 4:00pm    Wednesday:  8:00am - 4:00pm   To schedule an appointment with   Dr. Jameson,   please contact our   Specialty Scheduling Department at:     [phone]       [facility]  [address] [place] [address]   Friday:          8:00am - 4:00pm         Urgent Care Locations Clinic Hours Telephone Numbers     [facility]  [address] [place] [address]     Monday-Friday:     11:00pm - 9:00pm    Saturday-Sunday:  9:00am - 5:00pm   [phone]     [facility]  [address] [place] [address]     Monday-Friday:      5:00pm - 9:00pm     Saturday-Sunday:  9:00am - 5:00pm   [phone]

## 2017-07-14 NOTE — MR AVS SNAPSHOT
After Visit Summary   7/14/2017    Mavis Krishnamurthy    MRN: [id number]           Patient Information     Date Of Birth          1985        Visit Information        Provider Department      7/14/2017 8:15 AM Obie Jameson MD [facility]        Today's Diagnoses     LPRD (laryngopharyngeal reflux disease)    -  1    Globus sensation          Care Instructions    General Scheduling Information  To schedule your CT/MRI scan, please contact [place] [facility] at [phone] [address] [place] [address]    To schedule your Surgery, please contact our Specialty Schedulers at [phone]    ENT Clinic Locations Clinic Hours Telephone Number     [facility]  [address]  [place] [address]   Tuesday:       8:00am -- 4:00pm    Wednesday:  8:00am - 4:00pm   To schedule an appointment with   Dr. Jameson,   please contact our   Specialty Scheduling Department at:     [phone]       [facility]  [address] [place] [address]   Friday:          8:00am - 4:00pm         Urgent Care Locations Clinic Hours Telephone Numbers     [facility]  [address]  [place] [address]     Monday-Friday:     11:00pm - 9:00pm    Saturday-Sunday:  9:00am - 5:00pm   [phone]     [facility]  [address] [place] [address]     Monday-Friday:      5:00pm - 9:00pm     Saturday-Sunday:  9:00am - 5:00pm   [phone]               Follow-ups after your visit        Who to contact     If you have questions or need follow up information about today's clinic visit or your schedule please contact [facility] directly at [phone].  Normal or non-critical lab and imaging results will be communicated to you by MyChart, letter or phone within 4 business days after the clinic has received the results. If you do not hear from us within 7 days, please contact the clinic through MyChart or phone. If you have a critical or  "abnormal lab result, we will notify you by phone as soon as possible.  Submit refill requests through [employer] or call your pharmacy and they will forward the refill request to us. Please allow 3 business days for your refill to be completed.          Additional Information About Your Visit        MyChart Information     [employer] gives you secure access to your electronic health record. If you see a primary care provider, you can also send messages to your care team and make appointments. If you have questions, please call your primary care clinic.  If you do not have a primary care provider, please call [phone] and they will assist you.        Care EveryWhere ID     This is your Care EveryWhere ID. This could be used by other organizations to access your [facility] medical records  [id number]        Your Vitals Were     Respirations Height BMI (Body Mass Index)             16 1.651 m (5' 5\") 21.3 kg/m2          Blood Pressure from Last 3 Encounters:   07/13/17 108/66   06/09/17 112/68   06/05/17 110/60    Weight from Last 3 Encounters:   07/14/17 58.1 kg (128 lb)   07/13/17 58.2 kg (128 lb 3.2 oz)   06/09/17 57.2 kg (126 lb)              Today, you had the following     No orders found for display       Primary Care Provider Office Phone # Fax #    Leo Ceja [phone]       [facility] [address] [facility] [address]        Equal Access to Services     ROGELIO RAO : Hadii cassia mcclellano Soerica, waaxda luqadaha, qaybta kaalmada marjorieyada, natasha larkin. So [facility] [phone].    ATENCIÓN: Si habla español, tiene a restrepo disposición servicios gratuitos de asistencia lingüística. Llame al [phone].    We comply with applicable federal civil rights laws and [place] laws. We do not discriminate on the basis of race, color, national origin, age, disability sex, sexual orientation or gender identity.            Thank you!     Thank you for " [facility]  [facility]. Our goal is always to provide you with excellent care. Hearing back from our patients is one way we can continue to improve our services. Please take a few minutes to complete the written survey that you may receive in the mail after your visit with us. Thank you!             Your Updated Medication List - Protect others around you: Learn how to safely use, store and throw away your medicines at www.disposemymeds.org.          This list is accurate as of: 7/14/17  9:03 AM.  Always use your most recent med list.                   Brand Name Dispense Instructions for use Diagnosis    PRENATAL PO           PROBIOTIC ADVANCED Caps      Take 2 capsules by mouth daily        VITAMIN D PO      Take 5 chew tab by mouth daily

## 2017-07-14 NOTE — PROGRESS NOTES
I am seeing this patient in consultation for throat discomfort at the request of the provider Sohan Flannery.    Chief Complaint - foreign body sensation in throat    History of Present Illness - Mavis Krishnamurthy is a 32 year old female who presents with a history of feeling like something is in the back of the throat, low throat or upper neck, since a few weeks ago. She has had some heartburn symptoms, and burping, with reflux. Not throat clearing. Voicing has seemed raspy. No cough, hemoptysis, odynaphagia. No dysphagia with solids. Morning is the best, but then irritates throughout the day. Sometimes it is a burning. Had heartburn with pregnancy. No sneezing. H/o sinus surgery.    Past Medical History -   Patient Active Problem List   Diagnosis     CARDIOVASCULAR SCREENING; LDL GOAL LESS THAN 160     Allergic rhinitis     IBS (irritable bowel syndrome)     Chronic maxillary sinusitis     Lactose intolerance     Migraine     Abnormal antinuclear antibody titer     Bicuspid aortic valve       Current Medications -   Current Outpatient Prescriptions:      Cholecalciferol (VITAMIN D PO), Take 5 chew tab by mouth daily, Disp: , Rfl:      Probiotic Product (PROBIOTIC ADVANCED) CAPS, Take 2 capsules by mouth daily, Disp: , Rfl:      Prenatal Vit-Fe Fumarate-FA (PRENATAL PO), , Disp: , Rfl:     Allergies -   Allergies   Allergen Reactions     Mold      Seasonal Allergies        Social History -   Social History     Social History     Marital status:      Spouse name: Moo     Number of children: 1     Years of education: 16     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 No     Social History Narrative       Family History -   Family History   Problem  "Relation Age of Onset     Hypertension Mother      HEART DISEASE Father      open heart for endocarditis     Neurologic Disorder Father 16     migraines     Alzheimer Disease Maternal Grandmother 80     Neurologic Disorder Paternal Grandmother      migraines unknown age     Neurologic Disorder Sister 20     migraines       Review of Systems - As per HPI and PMHx, otherwise 7 system review is negative.    Physical Exam  Resp 16  Ht 1.651 m (5' 5\")  Wt 58.1 kg (128 lb)  BMI 21.3 kg/m2  General - The patient is in no distress. Alert and oriented to person and place, answers questions and cooperates with examination appropriately.   Voice and Breathing - The patient was breathing comfortably without the use of accessory muscles. There was no wheezing, stridor, or stertor.  The patients voice was clear and strong.  Eyes - Extraocular movements intact.  Sclera were not icteric or injected, conjunctiva were pink and moist.  Mouth - Examination of the oral cavity showed pink, healthy oral mucosa. No lesions or ulcerations noted.  The tongue was mobile and midline.  Throat - The walls of the oropharynx were smooth, symmetric, and had no lesions or ulcerations.  The tonsillar pillars and soft palate were symmetric.  The uvula was midline on elevation. Tonsils 1+ and quiet. No lesions.  Nose - External contour is symmetric, no gross deflection or scars.  Nasal mucosa is pink and moist with no abnormal mucus.  The septum was midline and non-obstructive, turbinates of normal size and position.  No polyps, masses, or purulence noted on examination.  Neck -  Palpation of the occipital, submental, submandibular, internal jugular chain, and supraclavicular nodes did not demonstrate any abnormal lymph nodes or masses. No parotid masses. Palpation of the thyroid was soft and smooth, with no nodules or goiter appreciated.  The trachea was mobile and midline.  Neurologic - CN II-XII are grossly intact, no focal neurologic deficits. "     A/P - Mavis Krishnamurthy is a 32 year old female with globus sensation but no evidence of infection, inflammation, or neoplasm on exam to explain the symptoms. She points to low in the throat, almost the upper chest, and so I don't think I can see that with our scopes without an EGD.     Her symptoms really seem like laryngopharyngeal reflux. I don't think she has been treated long enough. I have advised continuing the prilosec and then take zantac at night. Try for one month. If this fails, she may need GI referral and EGD. I provided counseling on lifestyle changes including avoidance of certain foods, sleeping on an incline, and avoiding lying down within 3-4 hours after eating.      Adult lifestyle changes to prevent LPR reviewed      Avoid eating and drinking within two to three hours prior to bedtime    Do not drink alcohol    Eat small meals and slowly    Limit problem foods:    o Caffeine  o Carbonated drinks  o Chocolate  o Peppermint  o Tomato  o Citrus fruits  o Fatty and fried foods      Lose weight    Quit smoking    Wear loose clothing      Dr. Obie Jameson  Otolaryngology  [facility]

## 2017-08-09 ENCOUNTER — OFFICE VISIT (OUTPATIENT)
Dept: OTOLARYNGOLOGY | Facility: CLINIC | Age: 32
End: 2017-08-09
Payer: COMMERCIAL

## 2017-08-09 VITALS — RESPIRATION RATE: 16 BRPM | WEIGHT: 126 LBS | BODY MASS INDEX: 20.99 KG/M2 | HEIGHT: 65 IN | TEMPERATURE: 98.4 F

## 2017-08-09 DIAGNOSIS — R00.2 PALPITATIONS: ICD-10-CM

## 2017-08-09 DIAGNOSIS — K21.00 GASTROESOPHAGEAL REFLUX DISEASE WITH ESOPHAGITIS: ICD-10-CM

## 2017-08-09 DIAGNOSIS — R09.A2 GLOBUS SENSATION: Primary | ICD-10-CM

## 2017-08-09 LAB
ANION GAP SERPL CALCULATED.3IONS-SCNC: 7 MMOL/L (ref 3–14)
BASOPHILS # BLD AUTO: 0 10E9/L (ref 0–0.2)
BASOPHILS NFR BLD AUTO: 0.5 %
BUN SERPL-MCNC: 11 MG/DL (ref 7–30)
CALCIUM SERPL-MCNC: 9.4 MG/DL (ref 8.5–10.1)
CHLORIDE SERPL-SCNC: 105 MMOL/L (ref 94–109)
CO2 SERPL-SCNC: 27 MMOL/L (ref 20–32)
CREAT SERPL-MCNC: 0.61 MG/DL (ref 0.52–1.04)
CRP SERPL-MCNC: <2.9 MG/L (ref 0–8)
DIFFERENTIAL METHOD BLD: NORMAL
EOSINOPHIL # BLD AUTO: 0.2 10E9/L (ref 0–0.7)
EOSINOPHIL NFR BLD AUTO: 2.7 %
ERYTHROCYTE [DISTWIDTH] IN BLOOD BY AUTOMATED COUNT: 13 % (ref 10–15)
GFR SERPL CREATININE-BSD FRML MDRD: NORMAL ML/MIN/1.7M2
GLUCOSE SERPL-MCNC: 99 MG/DL (ref 70–99)
HCT VFR BLD AUTO: 39 % (ref 35–47)
HGB BLD-MCNC: 13.6 G/DL (ref 11.7–15.7)
LYMPHOCYTES # BLD AUTO: 1.8 10E9/L (ref 0.8–5.3)
LYMPHOCYTES NFR BLD AUTO: 27.3 %
MCH RBC QN AUTO: 32.5 PG (ref 26.5–33)
MCHC RBC AUTO-ENTMCNC: 34.9 G/DL (ref 31.5–36.5)
MCV RBC AUTO: 93 FL (ref 78–100)
MONOCYTES # BLD AUTO: 0.6 10E9/L (ref 0–1.3)
MONOCYTES NFR BLD AUTO: 8.5 %
NEUTROPHILS # BLD AUTO: 4 10E9/L (ref 1.6–8.3)
NEUTROPHILS NFR BLD AUTO: 61 %
PLATELET # BLD AUTO: 271 10E9/L (ref 150–450)
POTASSIUM SERPL-SCNC: 4.4 MMOL/L (ref 3.4–5.3)
RBC # BLD AUTO: 4.19 10E12/L (ref 3.8–5.2)
SODIUM SERPL-SCNC: 139 MMOL/L (ref 133–144)
TSH SERPL DL<=0.005 MIU/L-ACNC: 0.88 MU/L (ref 0.4–4)
WBC # BLD AUTO: 6.6 10E9/L (ref 4–11)

## 2017-08-09 PROCEDURE — 84443 ASSAY THYROID STIM HORMONE: CPT | Performed by: OTOLARYNGOLOGY

## 2017-08-09 PROCEDURE — 99214 OFFICE O/P EST MOD 30 MIN: CPT | Mod: 25 | Performed by: OTOLARYNGOLOGY

## 2017-08-09 PROCEDURE — 85025 COMPLETE CBC W/AUTO DIFF WBC: CPT | Performed by: OTOLARYNGOLOGY

## 2017-08-09 PROCEDURE — 36415 COLL VENOUS BLD VENIPUNCTURE: CPT | Performed by: OTOLARYNGOLOGY

## 2017-08-09 PROCEDURE — 31575 DIAGNOSTIC LARYNGOSCOPY: CPT | Performed by: OTOLARYNGOLOGY

## 2017-08-09 PROCEDURE — 86140 C-REACTIVE PROTEIN: CPT | Performed by: OTOLARYNGOLOGY

## 2017-08-09 PROCEDURE — 80048 BASIC METABOLIC PNL TOTAL CA: CPT | Performed by: OTOLARYNGOLOGY

## 2017-08-09 NOTE — NURSING NOTE
"Chief Complaint   Patient presents with     RECHECK     Throat - symptoms has worsened since last visit, pet pt       Initial Temp 98.4  F (36.9  C) (Tympanic)  Resp 16  Ht 1.651 m (5' 5\")  Wt 57.2 kg (126 lb)  BMI 20.97 kg/m2 Estimated body mass index is 20.97 kg/(m^2) as calculated from the following:    Height as of this encounter: 1.651 m (5' 5\").    Weight as of this encounter: 57.2 kg (126 lb).  Medication Reconciliation: complete\  Francisco Javier Hemphill CMA      "

## 2017-08-09 NOTE — MR AVS SNAPSHOT
After Visit Summary   8/9/2017    Mavis Krishnamurthy    MRN: [id number]           Patient Information     Date Of Birth          1985        Visit Information        Provider Department      8/9/2017 11:00 AM Obie Jameson MD [facility]        Today's Diagnoses     Globus sensation    -  1    Palpitations          Care Instructions    General Scheduling Information  To schedule your CT/MRI scan, please contact [place] [facility] at [phone] [address] [place] [address]    To schedule your Surgery, please contact our Specialty Schedulers at [phone]    ENT Clinic Locations Clinic Hours Telephone Number     [facility]  [address]  [place] [address]   Tuesday:       8:00am -- 4:00pm    Wednesday:  8:00am - 4:00pm   To schedule an appointment with   Dr. Jameson,   please contact our   Specialty Scheduling Department at:     [phone]       [facility]  [address]   [place] [address]   Friday:          8:00am - 4:00pm         Urgent Care Locations Clinic Hours Telephone Numbers     [facility]  [address]  [place] [address]     Monday-Friday:     11:00pm - 9:00pm    Saturday-Sunday:  9:00am - 5:00pm   [phone]     [facility]  [address]   [place] [address]     Monday-Friday:      5:00pm - 9:00pm     Saturday-Sunday:  9:00am - 5:00pm   [phone]               Follow-ups after your visit        Your next 10 appointments already scheduled     Aug 11, 2017  3:00 PM CDT   Return Visit with Obie Jameson MD   [facility] ([facility]    [address] [facility] [address] [phone]              Who to contact     If you have questions or need follow up information about today's clinic visit or your schedule please contact [facility] directly at [phone].  Normal or non-critical lab and imaging results will be communicated to you by  "MyChart, letter or phone within 4 business days after the clinic has received the results. If you do not hear from us within 7 days, please contact the clinic through [employer] or phone. If you have a critical or abnormal lab result, we will notify you by phone as soon as possible.  Submit refill requests through [employer] or call your pharmacy and they will forward the refill request to us. Please allow 3 business days for your refill to be completed.          Additional Information About Your Visit        [email or web address] Information     [employer] gives you secure access to your electronic health record. If you see a primary care provider, you can also send messages to your care team and make appointments. If you have questions, please call your primary care clinic.  If you do not have a primary care provider, please call [phone] and they will assist you.        Care EveryWhere ID     This is your Care EveryWhere ID. This could be used by other organizations to access your [facility] medical records  [id number]        Your Vitals Were     Temperature Respirations Height BMI (Body Mass Index)          98.4  F (36.9  C) (Tympanic) 16 1.651 m (5' 5\") 20.97 kg/m2         Blood Pressure from Last 3 Encounters:   07/13/17 108/66   06/09/17 112/68   06/05/17 110/60    Weight from Last 3 Encounters:   08/09/17 57.2 kg (126 lb)   07/14/17 58.1 kg (128 lb)   07/13/17 58.2 kg (128 lb 3.2 oz)              We Performed the Following     Basic metabolic panel  (Ca, Cl, CO2, Creat, Gluc, K, Na, BUN)     CBC with platelets and differential     CRP, inflammation     Laryngoscopy, Fiber     TSH with free T4 reflex        Primary Care Provider Office Phone # Fax #    Leo Ceja [phone] [address] [facility] [address]        Equal Access to Services     ROGELIO RAO : Sampson mcclellano Soerica, waaxda luqadaha, qaybta kaalmada marjorieyasharmila, natasha larkin. So wagianni " [phone].    ATENCIÓN: Si candi luna, tiene a restrepo disposición servicios gratuitos de asistencia lingüística. Rajani al [phone].    We comply with applicable federal civil rights laws and [place] laws. We do not discriminate on the basis of race, color, national origin, age, disability sex, sexual orientation or gender identity.            Thank you!     Thank you for choosing [facility]  for your care. Our goal is always to provide you with excellent care. Hearing back from our patients is one way we can continue to improve our services. Please take a few minutes to complete the written survey that you may receive in the mail after your visit with us. Thank you!             Your Updated Medication List - Protect others around you: Learn how to safely use, store and throw away your medicines at www.disposemymeds.org.          This list is accurate as of: 8/9/17 12:40 PM.  Always use your most recent med list.                   Brand Name Dispense Instructions for use Diagnosis    PRENATAL PO           PROBIOTIC ADVANCED Caps      Take 2 capsules by mouth daily        VITAMIN D PO      Take 5 chew tab by mouth daily

## 2017-08-09 NOTE — PATIENT INSTRUCTIONS
General Scheduling Information  To schedule your CT/MRI scan, please contact [place] [facility] at [phone]   [address] [place] [address]    To schedule your Surgery, please contact our Specialty Schedulers at [phone]    ENT Clinic Locations Clinic Hours Telephone Number     [facility]  [address] [place] [address]   Tuesday:       8:00am -- 4:00pm    Wednesday:  8:00am - 4:00pm   To schedule an appointment with   Dr. Jameson,   please contact our   Specialty Scheduling Department at:     [phone]       [facility]  [address] [place] [address]   Friday:          8:00am - 4:00pm         Urgent Care Locations Clinic Hours Telephone Numbers     [facility]  [address] [place] [address]     Monday-Friday:     11:00pm - 9:00pm    Saturday-Sunday:  9:00am - 5:00pm   [phone]     [facility]  [address] [place] [address]     Monday-Friday:      5:00pm - 9:00pm     Saturday-Sunday:  9:00am - 5:00pm   [phone]

## 2017-08-09 NOTE — PROGRESS NOTES
Chief Complaint - foreign body sensation in throat, neck pain    History of Present Illness - Mavis Krishnamurthy is a 32 year old female who returns with a history of feeling like something is in the back of the throat, low throat or upper neck, since a couple months ago. She has had some burning symptoms, and burping, with reflux. It clears at night, but worsens as the day goes. Voicing has seemed raspy. No cough, hemoptysis, odynaphagia. No dysphagia with solids. Morning is the best, but then irritates throughout the day. Sometimes it is a burning, and it can radiate to the right neck and shoulder. Had heartburn with pregnancy. No sneezing. H/o sinus surgery. She took prilosec, zantac, and tums. It hasn't helped, and now things are worse. She has vomited after eating dinner 3 times. She had an EGD in 2014 after first child. She had a normal gastric emptying study. EGD then was normal. She had cyclic vomiting.     Past Medical History -   Patient Active Problem List   Diagnosis     CARDIOVASCULAR SCREENING; LDL GOAL LESS THAN 160     Allergic rhinitis     IBS (irritable bowel syndrome)     Chronic maxillary sinusitis     Lactose intolerance     Migraine     Abnormal antinuclear antibody titer     Bicuspid aortic valve       Current Medications -   Current Outpatient Prescriptions:      Cholecalciferol (VITAMIN D PO), Take 5 chew tab by mouth daily, Disp: , Rfl:      Probiotic Product (PROBIOTIC ADVANCED) CAPS, Take 2 capsules by mouth daily, Disp: , Rfl:      Prenatal Vit-Fe Fumarate-FA (PRENATAL PO), , Disp: , Rfl:     Allergies -   Allergies   Allergen Reactions     Mold      Seasonal Allergies        Social History -   Social History     Social History     Marital status:      Spouse name: Moo     Number of children: 1     Years of education: 16     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 No     Social History Narrative       Family History -   Family History   Problem Relation Age of Onset     Hypertension Mother      HEART DISEASE Father      open heart for endocarditis     Neurologic Disorder Father 16     migraines     Alzheimer Disease Maternal Grandmother 80     Neurologic Disorder Paternal Grandmother      migraines unknown age     Neurologic Disorder Sister 20     migraines       Review of Systems - As per HPI and PMHx, has diarrhea, heart palpitations, nausea, vomiting, otherwise 7 system review is negative.    Physical Exam  Temp 98.4  F (36.9  C) (Tympanic)  Resp 16  Ht 1.651 m (5' 5\")  Wt 57.2 kg (126 lb)  BMI 20.97 kg/m2  General - The patient is in no distress. Alert and oriented to person and place, answers questions and cooperates with examination appropriately.   Voice and Breathing - The patient was breathing comfortably without the use of accessory muscles. There was no wheezing, stridor, or stertor.  The patients voice was clear and strong.  Eyes - Extraocular movements intact.  Sclera were not icteric or injected, conjunctiva were pink and moist.  Mouth - Examination of the oral cavity showed pink, healthy oral mucosa. No lesions or ulcerations noted.  The tongue was mobile and midline.  Throat - The walls of the oropharynx were smooth, symmetric, and had no lesions or ulcerations.  The tonsillar pillars and soft palate were symmetric.  The uvula was midline on elevation. Tonsils 1+ and quiet. A cyst or stone right side is small.   Nose - External contour is symmetric, no gross deflection or scars.  Nasal mucosa is pink and moist with no abnormal mucus.  The septum was midline and non-obstructive, turbinates of normal size and position.  No polyps, masses, or purulence noted on examination.  Neck -  Palpation of the " occipital, submental, submandibular, internal jugular chain, and supraclavicular nodes did not demonstrate any abnormal lymph nodes or masses. No parotid masses. Palpation of the thyroid was soft and smooth, it maybe enlarged diffusely however.  The trachea was mobile and midline.  Neurologic - CN II-XII are grossly intact, no focal neurologic deficits.     Flexible Endoscopy -     Given the chief complaint, history, and physical examination, and to best visualize the airway anatomy, I proceeded with a fiberoptic examination.  Color photographs were taken for the permanent medical record. First I sprayed the L side of the nose with a mixture of lidocaine and neosynephrine.   I then passed the scope through the nasal cavity.  The nasal cavity was unremarkable.  The nasopharynx was mucosally covered and symmetric.  The Eustachian tube openings were unobstructed.  Going further down I had a clear view of the base of tongue which had normal appearing lingual tonsillar tissue.  The base of tongue was free of lesions, masses, and the vallecula was open.  The epiglottis was smooth and mucosally covered.  The supraglottic larynx was then clearly visualized and was normal.  The vocal cords moved smoothly and symmetrically, they were white and no lesions were seen.  The pyriform sinuses were open, and the limited view of the postcricoid region did not show any lesions.        A/P - Mavis Krishnamurthy is a 32 year old female with throat symptoms, some pain and some globus sensation but no evidence of infection, inflammation, or neoplasm on exam to explain the symptoms. She points to low in the throat, almost the upper chest. nasopharyngoscopy was normal. Is worsening despite reflux medication. Her thyroid maybe enlarged, and I wonder about postpartum thyroiditis. She has a lot of symptoms like palpitations, anxiety, nausea, heat intolerance that may suggest a thyroid disorder. I'll check this. If labs are normal, I will refer to  GI.     ADDENDUM: labs were normal (TSH, CBC, CRP, BMP). I will have her see GI and get a CT neck.         Dr. Obie Jameson  Otolaryngology  [facility]

## 2017-08-28 ENCOUNTER — RADIANT APPOINTMENT (OUTPATIENT)
Dept: CT IMAGING | Facility: CLINIC | Age: 32
End: 2017-08-28
Attending: OTOLARYNGOLOGY
Payer: COMMERCIAL

## 2017-08-28 DIAGNOSIS — K21.00 GASTROESOPHAGEAL REFLUX DISEASE WITH ESOPHAGITIS: ICD-10-CM

## 2017-08-28 DIAGNOSIS — R09.A2 GLOBUS SENSATION: ICD-10-CM

## 2017-08-28 PROCEDURE — 70491 CT SOFT TISSUE NECK W/DYE: CPT | Mod: TC

## 2017-08-28 RX ORDER — IOPAMIDOL 755 MG/ML
50 INJECTION, SOLUTION INTRAVASCULAR ONCE
Status: COMPLETED | OUTPATIENT
Start: 2017-08-28 | End: 2017-08-28

## 2017-08-28 RX ADMIN — IOPAMIDOL 80 ML: 755 INJECTION, SOLUTION INTRAVASCULAR at 16:09

## 2017-08-29 ENCOUNTER — TELEPHONE (OUTPATIENT)
Dept: OTOLARYNGOLOGY | Facility: CLINIC | Age: 32
End: 2017-08-29

## 2017-08-29 NOTE — TELEPHONE ENCOUNTER
Neck CT normal. She is still having reflux. I advised going back on prilosec and zantac daily. See GI ASAP. Right maxillary sinus with inflammation. She will contact me if it seems like she is getting an infection. She has allergies right now and is taking medication for this.

## 2017-09-25 ENCOUNTER — TRANSFERRED RECORDS (OUTPATIENT)
Dept: HEALTH INFORMATION MANAGEMENT | Facility: CLINIC | Age: 32
End: 2017-09-25

## 2017-09-27 ENCOUNTER — TELEPHONE (OUTPATIENT)
Dept: FAMILY MEDICINE | Facility: CLINIC | Age: 32
End: 2017-09-27

## 2017-09-27 NOTE — PROGRESS NOTES
SUBJECTIVE:   Mavis Krishnamurthy is a 32 year old female who presents to clinic today for the following health issues:      Vaginal Symptoms  Onset: x 5 month off and on    Description:  Vaginal Discharge: clear   Itching (Pruritis): no   Burning sensation:  no   Odor: no     Accompanying Signs & Symptoms:  Pain with Urination: no   Abdominal Pain: YES  Fever: no    History:   Sexually active: no ,not since she had her baby  New Partner: no   Possibility of Pregnancy:  No    Precipitating factors:   Recent Antibiotic Use: no     Alleviating factors:  none      Therapies Tried and outcome: none    LMP 9/11/17. Last 5-7 days. Heavy menses. She has only had 2 menses since having her baby. She is noticing increasing clear liquid discharge after her menses. She denies odor, burning, itching, and irregular bleeding.   She is okay to have screening done today. Denies pregnancy now.     She does have IBS. She is having intermittent suprapubic cramping. She is having intermittent loose stools. She denies bloody stools. She denies vomiting.       Problem list and histories reviewed & adjusted, as indicated.  Additional history: as documented    Patient Active Problem List   Diagnosis     CARDIOVASCULAR SCREENING; LDL GOAL LESS THAN 160     Allergic rhinitis     IBS (irritable bowel syndrome)     Chronic maxillary sinusitis     Lactose intolerance     Migraine     Abnormal antinuclear antibody titer     Bicuspid aortic valve     Past Surgical History:   Procedure Laterality Date     COLONOSCOPY WITH CO2 INSUFFLATION N/A 6/18/2015    Procedure: COLONOSCOPY WITH CO2 INSUFFLATION;  Surgeon: Angel Thomas MD;  Location:  OR     ENDOSCOPIC BALLOON SINUPLASTY, OPTICAL TRACKING SYSTEM ENDOSCOPIC SINUS SURGERY, COMBINED  11/7/2013    Procedure: COMBINED ENDOSCOPIC BALLOON SINUPLASTY, OPTICAL TRACKING SYSTEM ENDOSCOPIC SINUS SURGERY;  Bilateral Endoscopic Ethmoidectomy, Maxillary Antrostomy, Frontal Sinusototmy with Navigation  and Balloon and Propel Implants;  Surgeon: Vasquez Corona MD;  Location: [facility] OR     EXAM OF VAGINA,COLPOSCOPY      X -2     MOUTH SURGERY  2009    Gabbs Teeth      PHOTOREFRACTIVE KERATECTOMY      Oct and Dec 2015     UPPER GI ENDOSCOPY         Social History   Substance Use Topics     Smoking status: Never Smoker     Smokeless tobacco: Never Used     Alcohol use No     Family History   Problem Relation Age of Onset     Hypertension Mother      HEART DISEASE Father      open heart for endocarditis     Neurologic Disorder Father 16     migraines     Alzheimer Disease Maternal Grandmother 80     Neurologic Disorder Paternal Grandmother      migraines unknown age     Neurologic Disorder Sister 20     migraines         Current Outpatient Prescriptions   Medication Sig Dispense Refill     Probiotic Product (PROBIOTIC ADVANCED) CAPS Take 2 capsules by mouth daily       Allergies   Allergen Reactions     Mold      Seasonal Allergies      BP Readings from Last 3 Encounters:   09/29/17 121/82   07/13/17 108/66   06/09/17 112/68    Wt Readings from Last 3 Encounters:   09/29/17 122 lb (55.3 kg)   08/09/17 126 lb (57.2 kg)   07/14/17 128 lb (58.1 kg)                        Reviewed and updated as needed this visit by clinical staff     Reviewed and updated as needed this visit by Provider         ROS:  Constitutional, gi and gu systems are negative, except as otherwise noted.      OBJECTIVE:   /82  Pulse 108  Temp 99  F (37.2  C) (Oral)  Wt 122 lb (55.3 kg)  SpO2 100%  BMI 20.3 kg/m2  Body mass index is 20.3 kg/(m^2).  GENERAL: healthy, alert and no distress  ABDOMEN: soft, mild suprapubic tenderness, no guarding, no rebound, no hepatosplenomegaly, no masses and bowel sounds normal  MS: no gross musculoskeletal defects noted, no edema  SKIN: no suspicious lesions or rashes  BACK: no CVA tenderness, no paralumbar tenderness    Diagnostic Test Results:  Results for orders placed or performed in visit on  09/29/17 (from the past 24 hour(s))   Wet prep   Result Value Ref Range    Specimen Description Vagina     Wet Prep No clue cells seen     Wet Prep No Trichomonas seen     Wet Prep No yeast seen    UA reflex to Microscopic and Culture   Result Value Ref Range    Color Urine Yellow     Appearance Urine Clear     Glucose Urine Negative NEG^Negative mg/dL    Bilirubin Urine Negative NEG^Negative    Ketones Urine 15 (A) NEG^Negative mg/dL    Specific Gravity Urine 1.020 1.003 - 1.035    Blood Urine Negative NEG^Negative    pH Urine 6.0 5.0 - 7.0 pH    Protein Albumin Urine Negative NEG^Negative mg/dL    Urobilinogen Urine 0.2 0.2 - 1.0 EU/dL    Nitrite Urine Negative NEG^Negative    Leukocyte Esterase Urine Negative NEG^Negative    Source Midstream Urine      Gonorrhea and chlamydia is pending    ASSESSMENT/PLAN:       ICD-10-CM    1. Screening examination for venereal disease Z11.3 Chlamydia trachomatis PCR     Neisseria gonorrhoeae PCR   2. Vaginal discharge N89.8 Wet prep   3. Suprapubic cramping R10.2 Wet prep     UA reflex to Microscopic and Culture       Patient Instructions   The vaginal discharge is most likely your normal vaginal discharge.   The wet prep was negative for yeast, bacterial vaginosis, and trichomonas.    The gonorrhea and chlamydia screenings are pending. If either are positive, a nurse will contact you. If both are negative, I will [facility] the results [facility].    Your urinalysis shows no signs of infection.     I do recommend follow up with your gastroenterologist to further discuss treatment of your episodic flare-ups of your IBS.     I do recommend chiropractic therapy for your low back pain. Follow up with primary care provider if no improvement of the back pain or any worsening.   Diet and Lifestyle Tips for Irritable Bowel Syndrome (IBS)    Your healthcare professional may suggest some lifestyle changes to help control your IBS. Changing your diet and managing stress are two of the  most important. Follow your healthcare provider s instructions and try some of the suggestions below.  Change your diet  Your diet may be an important cause of IBS symptoms. You may want to try the following:    Pay attention to what foods bother you, and avoid them. For example, dairy products are hard for some people to digest.    Drink 6 to 8 glasses of water a day.    Avoid caffeine and tobacco. These are muscle stimulants and can affect the working of your digestive tract.    Avoid alcohol, which can irritate your digestive tract and make your symptoms worse.    Eat more fiber if constipation is a problem. Fiber makes the stool softer and easier to pass through the colon.  Reduce stress  If stress or anxiety makes your IBS symptoms worse, learning how to manage stress may help you feel better. Try these tips:    Identify the causes of stress in your life.    Learn new ways to cope with them.    Regular exercise is a great way to relieve stress. It can also help ease constipation.  Date Last Reviewed: 7/1/2016 2000-2017 [employer]. [address] [place] [address], [place] [address]. All rights reserved. This information is not intended as a substitute for professional medical care. Always follow your healthcare professional's instructions.            Phuong Chua PA-C  [facility]

## 2017-09-27 NOTE — TELEPHONE ENCOUNTER
Patient states that she's had intermittent vaginal drainage since birth of her baby 5 months ago.  She states it's clear and watery; not mucusy.  She states that she had mentioned it to Edilia Nicole at her 6 week check and she had told her it was just her body trying to get back to normal.  She has had 2 regular periods now since birth of baby; last one about 10 days ago; lasting 5-7 days.  No itching, no odor.  No fever, chills, sweats.   She had tried to get in with OB/GYN but are booked out.  She will keep her appointment with Phuong Chua PA-C for Friday.  Valerie Kaye RN

## 2017-09-29 ENCOUNTER — OFFICE VISIT (OUTPATIENT)
Dept: FAMILY MEDICINE | Facility: CLINIC | Age: 32
End: 2017-09-29
Payer: COMMERCIAL

## 2017-09-29 VITALS
SYSTOLIC BLOOD PRESSURE: 121 MMHG | TEMPERATURE: 99 F | WEIGHT: 122 LBS | DIASTOLIC BLOOD PRESSURE: 82 MMHG | BODY MASS INDEX: 20.3 KG/M2 | HEART RATE: 108 BPM | OXYGEN SATURATION: 100 %

## 2017-09-29 DIAGNOSIS — R10.2 SUPRAPUBIC CRAMPING: ICD-10-CM

## 2017-09-29 DIAGNOSIS — N89.8 VAGINAL DISCHARGE: Primary | ICD-10-CM

## 2017-09-29 DIAGNOSIS — Z11.3 SCREENING EXAMINATION FOR VENEREAL DISEASE: ICD-10-CM

## 2017-09-29 LAB
ALBUMIN UR-MCNC: NEGATIVE MG/DL
APPEARANCE UR: CLEAR
BILIRUB UR QL STRIP: NEGATIVE
COLOR UR AUTO: YELLOW
GLUCOSE UR STRIP-MCNC: NEGATIVE MG/DL
HGB UR QL STRIP: NEGATIVE
KETONES UR STRIP-MCNC: 15 MG/DL
LEUKOCYTE ESTERASE UR QL STRIP: NEGATIVE
NITRATE UR QL: NEGATIVE
PH UR STRIP: 6 PH (ref 5–7)
SOURCE: ABNORMAL
SP GR UR STRIP: 1.02 (ref 1–1.03)
SPECIMEN SOURCE: NORMAL
UROBILINOGEN UR STRIP-ACNC: 0.2 EU/DL (ref 0.2–1)
WET PREP SPEC: NORMAL

## 2017-09-29 PROCEDURE — 99213 OFFICE O/P EST LOW 20 MIN: CPT | Performed by: PHYSICIAN ASSISTANT

## 2017-09-29 PROCEDURE — 81003 URINALYSIS AUTO W/O SCOPE: CPT | Performed by: PHYSICIAN ASSISTANT

## 2017-09-29 PROCEDURE — 87210 SMEAR WET MOUNT SALINE/INK: CPT | Performed by: PHYSICIAN ASSISTANT

## 2017-09-29 PROCEDURE — 87491 CHLMYD TRACH DNA AMP PROBE: CPT | Performed by: PHYSICIAN ASSISTANT

## 2017-09-29 PROCEDURE — 87591 N.GONORRHOEAE DNA AMP PROB: CPT | Performed by: PHYSICIAN ASSISTANT

## 2017-09-29 NOTE — MR AVS SNAPSHOT
After Visit Summary   9/29/2017    Mavis Krishnamurthy    MRN: [id number]           Patient Information     Date Of Birth          1985        Visit Information        Provider Department      9/29/2017 2:00 PM Phuong Chua PA-C [facility]        Today's Diagnoses     Screening examination for venereal disease    -  1    Vaginal discharge        Suprapubic cramping          Care Instructions    The vaginal discharge is most likely your normal vaginal discharge.   The wet prep was negative for yeast, bacterial vaginosis, and trichomonas.    The gonorrhea and chlamydia screenings are pending. If either are positive, a nurse will contact you. If both are negative, I will release the results to your mychart.    Your urinalysis shows no signs of infection.     I do recommend follow up with your gastroenterologist to further discuss treatment of your episodic flare-ups of your IBS.     I do recommend chiropractic therapy for your low back pain. Follow up with primary care provider if no improvement of the back pain or any worsening.   Diet and Lifestyle Tips for Irritable Bowel Syndrome (IBS)    Your healthcare professional may suggest some lifestyle changes to help control your IBS. Changing your diet and managing stress are two of the most important. Follow your healthcare provider s instructions and try some of the suggestions below.  Change your diet  Your diet may be an important cause of IBS symptoms. You may want to try the following:    Pay attention to what foods bother you, and avoid them. For example, dairy products are hard for some people to digest.    Drink 6 to 8 glasses of water a day.    Avoid caffeine and tobacco. These are muscle stimulants and can affect the working of your digestive tract.    Avoid alcohol, which can irritate your digestive tract and make your symptoms worse.    Eat more fiber if constipation is a problem. Fiber makes the stool softer and easier to  pass through the colon.  Reduce stress  If stress or anxiety makes your IBS symptoms worse, learning how to manage stress may help you feel better. Try these tips:    Identify the causes of stress in your life.    Learn new ways to cope with them.    Regular exercise is a great way to relieve stress. It can also help ease constipation.  Date Last Reviewed: 7/1/2016 2000-2017 [employer]. [address] [place] [address], [place] [address]. All rights reserved. This information is not intended as a substitute for professional medical care. Always follow your healthcare professional's instructions.                Follow-ups after your visit        Who to contact     If you have questions or need follow up information about today's clinic visit or your schedule please contact [facility] directly at [phone].  Normal or non-critical lab and imaging results will be communicated to you by MyChart, letter or phone within 4 business days after the clinic has received the results. If you do not hear from us within 7 days, please contact the clinic through [facility] or phone. If you have a critical or abnormal lab result, we will notify you by phone as soon as possible.  Submit refill requests through [employer] or call your pharmacy and they will forward the refill request to us. Please allow 3 business days for your refill to be completed.          Additional Information About Your Visit        [employer] Information     [employer] gives you secure access to your electronic health record. If you see a primary care provider, you can also send messages to your care team and make appointments. If you have questions, please call your primary care clinic.  If you do not have a primary care provider, please call [phone] and they will assist you.        Care EveryWhere ID     This is your Care EveryWhere ID. This could be used by other organizations to access your [facility] medical records  [id number]         Your Vitals Were     Pulse Temperature Pulse Oximetry BMI (Body Mass Index)          108 99  F (37.2  C) (Oral) 100% 20.3 kg/m2         Blood Pressure from Last 3 Encounters:   09/29/17 121/82   07/13/17 108/66   06/09/17 112/68    Weight from Last 3 Encounters:   09/29/17 122 lb (55.3 kg)   08/09/17 126 lb (57.2 kg)   07/14/17 128 lb (58.1 kg)              We Performed the Following     Chlamydia trachomatis PCR     Neisseria gonorrhoeae PCR     UA reflex to Microscopic and Culture     Wet prep          Today's Medication Changes          These changes are accurate as of: 9/29/17  2:45 PM.  If you have any questions, ask your nurse or doctor.               Stop taking these medicines if you haven't already. Please contact your care team if you have questions.     PRENATAL PO   Stopped by:  Phuong Chua PA-C           VITAMIN D PO   Stopped by:  Phuong Chua PA-C                    Primary Care Provider Office Phone # Fax #    Leo Tariq Ceja [phone] [address] [facility] [address]        Equal Access to Services     [facility]: Hadii aad amee hadasho Sobariali, waaxda luqadaha, qaybta kaalmada adeegyada, natasha torres . So [facility] [phone].    ATENCIÓN: Si habla español, tiene a restrepo disposición servicios gratuitos de asistencia lingüística. Llame al [phone].    We comply with applicable federal civil rights laws and [place] laws. We do not discriminate on the basis of race, color, national origin, age, disability, sex, sexual orientation, or gender identity.            Thank you!     Thank you for choosing [facility]  for your care. Our goal is always to provide you with excellent care. Hearing back from our patients is one way we can continue to improve our services. Please take a few minutes to complete the written survey that you may receive in the mail after your visit with us. Thank you!             Your  Updated Medication List - Protect others around you: Learn how to safely use, store and throw away your medicines at www.disposemymeds.org.          This list is accurate as of: 9/29/17  2:45 PM.  Always use your most recent med list.                   Brand Name Dispense Instructions for use Diagnosis    PROBIOTIC ADVANCED Caps      Take 2 capsules by mouth daily

## 2017-09-29 NOTE — NURSING NOTE
"Chief Complaint   Patient presents with     Vaginal Problem       Initial /82  Pulse 108  Temp 99  F (37.2  C) (Oral)  Wt 122 lb (55.3 kg)  SpO2 100%  BMI 20.3 kg/m2 Estimated body mass index is 20.3 kg/(m^2) as calculated from the following:    Height as of 8/9/17: 5' 5\" (1.651 m).    Weight as of this encounter: 122 lb (55.3 kg).  Medication Reconciliation: complete     Diane Shipley cma      "

## 2017-09-29 NOTE — PATIENT INSTRUCTIONS
The vaginal discharge is most likely your normal vaginal discharge.   The wet prep was negative for yeast, bacterial vaginosis, and trichomonas.    The gonorrhea and chlamydia screenings are pending. If either are positive, a nurse will contact you. If both are negative, I will release the results to your mychart.    Your urinalysis shows no signs of infection.     I do recommend follow up with your gastroenterologist to further discuss treatment of your episodic flare-ups of your IBS.     I do recommend chiropractic therapy for your low back pain. Follow up with primary care provider if no improvement of the back pain or any worsening.   Diet and Lifestyle Tips for Irritable Bowel Syndrome (IBS)    Your healthcare professional may suggest some lifestyle changes to help control your IBS. Changing your diet and managing stress are two of the most important. Follow your healthcare provider s instructions and try some of the suggestions below.  Change your diet  Your diet may be an important cause of IBS symptoms. You may want to try the following:    Pay attention to what foods bother you, and avoid them. For example, dairy products are hard for some people to digest.    Drink 6 to 8 glasses of water a day.    Avoid caffeine and tobacco. These are muscle stimulants and can affect the working of your digestive tract.    Avoid alcohol, which can irritate your digestive tract and make your symptoms worse.    Eat more fiber if constipation is a problem. Fiber makes the stool softer and easier to pass through the colon.  Reduce stress  If stress or anxiety makes your IBS symptoms worse, learning how to manage stress may help you feel better. Try these tips:    Identify the causes of stress in your life.    Learn new ways to cope with them.    Regular exercise is a great way to relieve stress. It can also help ease constipation.  Date Last Reviewed: 7/1/2016 2000-2017 [employer]. [address] [facility],  [place] [address]. All rights reserved. This information is not intended as a substitute for professional medical care. Always follow your healthcare professional's instructions.

## 2017-10-01 LAB
C TRACH DNA SPEC QL NAA+PROBE: NEGATIVE
N GONORRHOEA DNA SPEC QL NAA+PROBE: NEGATIVE
SPECIMEN SOURCE: NORMAL
SPECIMEN SOURCE: NORMAL

## 2017-10-13 ENCOUNTER — TRANSFERRED RECORDS (OUTPATIENT)
Dept: HEALTH INFORMATION MANAGEMENT | Facility: CLINIC | Age: 32
End: 2017-10-13

## 2017-10-27 ENCOUNTER — OFFICE VISIT (OUTPATIENT)
Dept: OBGYN | Facility: CLINIC | Age: 32
End: 2017-10-27
Payer: COMMERCIAL

## 2017-10-27 VITALS
DIASTOLIC BLOOD PRESSURE: 72 MMHG | BODY MASS INDEX: 20.7 KG/M2 | WEIGHT: 124.4 LBS | OXYGEN SATURATION: 100 % | HEART RATE: 90 BPM | SYSTOLIC BLOOD PRESSURE: 109 MMHG

## 2017-10-27 DIAGNOSIS — N93.9 ABNORMAL UTERINE BLEEDING: ICD-10-CM

## 2017-10-27 DIAGNOSIS — N89.8 VAGINAL DISCHARGE: Primary | ICD-10-CM

## 2017-10-27 PROCEDURE — 99214 OFFICE O/P EST MOD 30 MIN: CPT | Performed by: OBSTETRICS & GYNECOLOGY

## 2017-10-27 NOTE — NURSING NOTE
"Chief Complaint   Patient presents with     Vaginal Problem     seen 2 weeks ago having heavier periods and spotting in between delivered 6 months ago       Initial /72 (BP Location: Right arm, Cuff Size: Adult Regular)  Pulse 90  Wt 124 lb 6.4 oz (56.4 kg)  LMP 10/14/2017 (Exact Date)  SpO2 100%  Breastfeeding? No  BMI 20.7 kg/m2 Estimated body mass index is 20.7 kg/(m^2) as calculated from the following:    Height as of 8/9/17: 5' 5\" (1.651 m).    Weight as of this encounter: 124 lb 6.4 oz (56.4 kg).  Medication Reconciliation: complete   PRESTON Chávez 10/27/2017         "

## 2017-10-27 NOTE — MR AVS SNAPSHOT
After Visit Summary   10/27/2017    Mavis Krishnamurthy    MRN: [id number]           Patient Information     Date Of Birth          1985        Visit Information        Provider Department      10/27/2017 9:15 AM Jabier Robert MD [facility]        Today's Diagnoses     Vaginal discharge    -  1    Abnormal uterine bleeding           Follow-ups after your visit        Follow-up notes from your care team     Return if symptoms worsen or fail to improve.      Who to contact     If you have questions or need follow up information about today's clinic visit or your schedule please contact [facility] directly at [phone].  Normal or non-critical lab and imaging results will be communicated to you by MyChart, letter or phone within 4 business days after the clinic has received the results. If you do not hear from us within 7 days, please contact the clinic through [employer] or phone. If you have a critical or abnormal lab result, we will notify you by phone as soon as possible.  Submit refill requests through [employer] or call your pharmacy and they will forward the refill request to us. Please allow 3 business days for your refill to be completed.          Additional Information About Your Visit        MyChart Information     [employer] gives you secure access to your electronic health record. If you see a primary care provider, you can also send messages to your care team and make appointments. If you have questions, please call your primary care clinic.  If you do not have a primary care provider, please call [phone] and they will assist you.        Care EveryWhere ID     This is your Care EveryWhere ID. This could be used by other organizations to access your [facility] medical records  [id number]        Your Vitals Were     Pulse Last Period Pulse Oximetry Breastfeeding? BMI (Body Mass Index)       90 10/14/2017 (Exact Date) 100% No 20.7 kg/m2        Blood Pressure  from Last 3 Encounters:   10/27/17 109/72   09/29/17 121/82   07/13/17 108/66    Weight from Last 3 Encounters:   10/27/17 124 lb 6.4 oz (56.4 kg)   09/29/17 122 lb (55.3 kg)   08/09/17 126 lb (57.2 kg)              Today, you had the following     No orders found for display       Primary Care Provider Office Phone # Fax #    Leo Ceja [phone] [id number] [facility] [address]        Equal Access to Services     [facility]: Hadii aad ku hadasho Soomaali, waaxda luqadaha, qaybta kaalmada ademerleneyasharmila, natasha torres . So [facility] [phone].    ATENCIÓN: Si habla español, tiene a restrepo disposición servicios gratuitos de asistencia lingüística. [facility] [phone].    We comply with applicable federal civil rights laws and [place] laws. We do not discriminate on the basis of race, color, national origin, age, disability, sex, sexual orientation, or gender identity.            Thank you!     Thank you for choosing [facility]  for your care. Our goal is always to provide you with excellent care. Hearing back from our patients is one way we can continue to improve our services. Please take a few minutes to complete the written survey that you may receive in the mail after your visit with us. Thank you!             Your Updated Medication List - Protect others around you: Learn how to safely use, store and throw away your medicines at www.disposemymeds.org.          This list is accurate as of: 10/27/17 11:59 PM.  Always use your most recent med list.                   Brand Name Dispense Instructions for use Diagnosis    MULTIVITAMIN ADULT PO           OMEPRAZOLE PO           PROBIOTIC ADVANCED Caps      Take 2 capsules by mouth daily

## 2017-10-27 NOTE — PROGRESS NOTES
Mavis is a 32 year old .  She presents today with increase amount of vaginal discharge that has been occurring since she delivered about 6 months ago.   She has had intermittent vaginal discharge that is heavier after her menses.  She has a lot of clear discharge that week, some streaks of blood in the discharge at that time also. She notes that when she is getting the streaks of bleeding with the discharge she has associated cramping.   She feels that it is more of an inconvenience.  She has not noted any aggravating or alleviating factors for the spotting or for the cramping.    She is not breast feeding.  The couple is using condoms.   She had been on OCPs both combination and progestin only OCPs.  She has had migraines with auras with both type of the OCPs. She has also used the Nuva Ring in the past and she feels that she had similar concerns with the Nuva Ring when she used it a number of years ago.    She was seen by FP and she has had evaluation of the discharge and the following labs were ordered and they are reviewed today.     Component      Latest Ref Rng & Units 2017           2:12 PM  2:12 PM   Color Urine       Yellow    Appearance Urine       Clear    Glucose Urine      NEG:Negative mg/dL Negative    Bilirubin Urine      NEG:Negative Negative    Ketones Urine      NEG:Negative mg/dL 15 (A)    Specific Gravity Urine      1.003 - 1.035 1.020    Blood Urine      NEG:Negative Negative    pH Urine      5.0 - 7.0 pH 6.0    Protein Albumin Urine      NEG:Negative mg/dL Negative    Urobilinogen Urine      0.2 - 1.0 EU/dL 0.2    Nitrite Urine      NEG:Negative Negative    Leukocyte Esterase Urine      NEG:Negative Negative    Source       Midstream Urine    Specimen Description       Vagina Vagina   Wet Prep        No clue cells seen   Chlamydia Trachomatis PCR      NEG:Negative Negative    Specimen Descrip       Vagina    N Gonorrhea PCR      NEG:Negative Negative      Component       Latest Ref Rng & Units 2017           2:12 PM  2:12 PM   Color Urine           Appearance Urine           Glucose Urine      NEG:Negative mg/dL     Bilirubin Urine      NEG:Negative     Ketones Urine      NEG:Negative mg/dL     Specific Gravity Urine      1.003 - 1.035     Blood Urine      NEG:Negative     pH Urine      5.0 - 7.0 pH     Protein Albumin Urine      NEG:Negative mg/dL     Urobilinogen Urine      0.2 - 1.0 EU/dL     Nitrite Urine      NEG:Negative     Leukocyte Esterase Urine      NEG:Negative     Source           Specimen Description           Wet Prep       No Trichomonas seen No yeast seen   Chlamydia Trachomatis PCR      NEG:Negative     Specimen Descrip           N Gonorrhea PCR      NEG:Negative           Past Medical History:   Diagnosis Date     Abnormal Pap smear     resolved, colposcopy     Abnormal Pap smears     Mild dysplasia-     AR (allergic rhinitis)        Past Surgical History:   Procedure Laterality Date     COLONOSCOPY WITH CO2 INSUFFLATION N/A 2015    Procedure: COLONOSCOPY WITH CO2 INSUFFLATION;  Surgeon: Angel Thomas MD;  Location: [facility] OR     ENDOSCOPIC BALLOON SINUPLASTY, OPTICAL TRACKING SYSTEM ENDOSCOPIC SINUS SURGERY, COMBINED  2013    Procedure: COMBINED ENDOSCOPIC BALLOON SINUPLASTY, OPTICAL TRACKING SYSTEM ENDOSCOPIC SINUS SURGERY;  Bilateral Endoscopic Ethmoidectomy, Maxillary Antrostomy, Frontal Sinusototmy with Navigation and Balloon and Propel Implants;  Surgeon: Vasquez Corona MD;  Location:  OR     EXAM OF VAGINA,COLPOSCOPY      X -2     MOUTH SURGERY  2009    [place] Teeth      PHOTOREFRACTIVE KERATECTOMY      Oct and Dec 2015     UPPER GI ENDOSCOPY         Obstetric History       T2      L2     SAB0   TAB0   Ectopic0   Multiple0   Live Births2       # Outcome Date GA Lbr Jabari/2nd Weight Sex Delivery Anes PTL Lv   2 Term 17 37w2d  6 lb 13 oz (3.09 kg) M    VINCENT   1 Term 04/15/13 37w0d 05:50 /  01:50 5 lb 11.2 oz (2.585 kg) M Vag-Spont EPI  VINCENT      Name: TRUNG ROBIN      Apgar1:  8                Apgar5: 9          Gynecological History         Patient's last menstrual period was 10/14/2017 (exact date).    STD: HPV/no PID/no IUD      see above HPI        Allergies   Allergen Reactions     Mold      Seasonal Allergies        Current Outpatient Prescriptions   Medication Sig Dispense Refill     OMEPRAZOLE PO        Multiple Vitamins-Minerals (MULTIVITAMIN ADULT PO)        Probiotic Product (PROBIOTIC ADVANCED) CAPS Take 2 capsules by mouth daily         Social History     Social History     Marital status:      Spouse name: Moo     Number of children: 1     Years of education: 16     Occupational History     Accounting [employer]     Social History Main Topics     Smoking status: Never Smoker     Smokeless tobacco: Never Used     Alcohol use No     Drug use: No     Sexual activity: Yes     Partners: Male     Other Topics Concern     Parent/Sibling W/ Cabg, Mi Or Angioplasty Before 65f 55m? No     Social History Narrative       Family History   Problem Relation Age of Onset     Hypertension Mother      HEART DISEASE Father      open heart for endocarditis     Neurologic Disorder Father 16     migraines     Alzheimer Disease Maternal Grandmother 80     Neurologic Disorder Paternal Grandmother      migraines unknown age     Neurologic Disorder Sister 20     migraines         Review of Systems:  10 point ROS of systems including Constitutional, Eyes, Respiratory, Cardiovascular, Gastroenterology, Genitourinary, Integumentary, Muscularskeletal, Psychiatric were all negative except for pertinent positives noted in my HPI and in the PMH.          EXAM:  /72 (BP Location: Right arm, Cuff Size: Adult Regular)  Pulse 90  Wt 124 lb 6.4 oz (56.4 kg)  LMP 10/14/2017 (Exact Date)  SpO2 100%  Breastfeeding? No  BMI 20.7 kg/m2  Body mass index is 20.7 kg/(m^2).  General Appearance:  healthy,  alert, active, no distress  Skin:  Normal skin turgor  Neuro:  Alert, cranial nerves grossly intact  HEENT: NCAT  Neck:  No masses or lesions carotids are +2/4. No bruits heard  Lungs:  Good respiratory effort   Abdomen: Soft, nontender.  Normal bowel sounds.  No masses  Vulva: No external lesions, normal hair distribution, no adenopathy  BUS:  Normal, no masses noted  Urethral meatus:  No masses noted  Urethra:  No hypermobility  Vagina: Moist, pink, no abnormal discharge, well rugated, no lesions.  Clear cervical mucous is seen  Cervix: Smooth, pink, no visible lesions.  Clear cervical mucous is seen  Uterus: Normal size, anteverted, non-tender, mobile  Ovaries: No mass, non-tender, mobile  Extremities:  No clubbing, cyanosis or edema.        ASSESSMENT:  Vaginal discharge  Abnormal bleeding       PLAN:  We discussed the bleeding profiles.  Even though menstrual changes and irregularities are common and expected, they may also indicate or precipitate endometrial abnormalities.  The patient and I discussed the options for evaluation.  The EMB, SIS and the D&C were reviewed with her.  The EMB will not remove a polyp, but will give a tissue sample.  The SIS will further evaluate the lining, but no tissue sample, and should she have a suspected polyp, it would not be removed.  The D&C is more expensive but is currently the gold standard.  At this time she is less than 35 and no risk factors for uterine abnormalities.   We also reviewed vaginal discharge during the cycle.  The clear mucous is likely indication of being mid-cycle (currently).  The spotting that she has might be ovulatory spotting.    Together we reviewed the risks and benefits of medical versus surgical therapy.  medical therapy.  At this time she is at low risk for abnormalities occurring and after discussion of options, she has decided that she will monitor the symptoms and if it continues, then she will look at further evaluation and  management      Jabier Robert MD
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
## 2017-11-24 ENCOUNTER — OFFICE VISIT (OUTPATIENT)
Dept: FAMILY MEDICINE | Facility: CLINIC | Age: 32
End: 2017-11-24
Payer: COMMERCIAL

## 2017-11-24 VITALS
BODY MASS INDEX: 20.63 KG/M2 | TEMPERATURE: 98.2 F | OXYGEN SATURATION: 100 % | DIASTOLIC BLOOD PRESSURE: 78 MMHG | SYSTOLIC BLOOD PRESSURE: 118 MMHG | WEIGHT: 124 LBS | HEART RATE: 72 BPM

## 2017-11-24 DIAGNOSIS — J01.01 ACUTE RECURRENT MAXILLARY SINUSITIS: Primary | ICD-10-CM

## 2017-11-24 PROCEDURE — 99213 OFFICE O/P EST LOW 20 MIN: CPT | Performed by: NURSE PRACTITIONER

## 2017-11-24 RX ORDER — METHYLPREDNISOLONE 4 MG
TABLET, DOSE PACK ORAL
Qty: 21 TABLET | Refills: 0 | Status: SHIPPED | OUTPATIENT
Start: 2017-11-24 | End: 2018-01-10

## 2017-11-24 ASSESSMENT — PAIN SCALES - GENERAL: PAINLEVEL: NO PAIN (0)

## 2017-11-24 NOTE — PATIENT INSTRUCTIONS
[facility]    If you have any questions regarding to your visit please contact your care team:     Team Pink:   Clinic Hours Telephone Number   Internal Medicine:  Dr. Rebeca Watters NP       7am-7pm  Monday - Thursday   7am-5pm  Fridays  [phone]  (Appointment scheduling available 24/7)    Questions about your visit?  Team Line  [phone]   Urgent Care - [place] and [facility] - 11am-9pm Monday-Friday Saturday-Sunday- 9am-5pm   [place] - 5pm-9pm Monday-Friday Saturday-Sunday- 9am-5pm  [phone] - [place]   [phone] - [place]       What options do I have for visits at the clinic other than the traditional office visit?  To expand how we care for you, many of our providers are utilizing electronic visits (e-visits) and telephone visits, when medically appropriate, for interactions with their patients rather than a visit in the clinic.   We also offer nurse visits for many medical concerns. Just like any other service, we will bill your insurance company for this type of visit based on time spent on the phone with your provider. Not all insurance companies cover these visits. Please check with your medical insurance if this type of visit is covered. You will be responsible for any charges that are not paid by your insurance.      E-visits via [employer]:  generally incur a $35.00 fee.  Telephone visits:  Time spent on the phone: *charged based on time that is spent on the phone in increments of 10 minutes. Estimated cost:   5-10 mins $30.00   11-20 mins. $59.00   21-30 mins. $85.00   Use [email or web address] (secure email communication and access to your chart) to send your primary care provider a message or make an appointment. Ask someone on your Team how to sign up for [employer].    For a Price Quote for your services, please call our Consumer Price Line at [phone].    As always, Thank you for trusting us with your health care  needs!    Discharged by Patricia HAMILTON CMA ([facility])

## 2017-11-24 NOTE — NURSING NOTE
"Chief Complaint   Patient presents with     Sinus Problem     x1+ months, getting worse       Initial /78 (BP Location: Right arm, Cuff Size: Adult Regular)  Pulse 72  Temp 98.2  F (36.8  C) (Oral)  Wt 124 lb (56.2 kg)  LMP 11/15/2017 (Approximate)  SpO2 100%  Breastfeeding? No  BMI 20.63 kg/m2 Estimated body mass index is 20.63 kg/(m^2) as calculated from the following:    Height as of 8/9/17: 5' 5\" (1.651 m).    Weight as of this encounter: 124 lb (56.2 kg).  Medication Reconciliation: complete       Bee Glynn CMA      "

## 2017-11-24 NOTE — MR AVS SNAPSHOT
After Visit Summary   11/24/2017    Mavis Krishnamurthy    MRN: [id number]           Patient Information     Date Of Birth          1985        Visit Information        Provider Department      11/24/2017 2:40 PM Mavis Watters APRN [facility]        Today's Diagnoses     Acute recurrent maxillary sinusitis    -  1      Care Instructions    [facility]    If you have any questions regarding to your visit please contact your care team:     Team Pink:   Clinic Hours Telephone Number   Internal Medicine:  Dr. Rebeca Watters, NP       7am-7pm  Monday - Thursday   7am-5pm  Fridays  [phone]  (Appointment scheduling available 24/7)    Questions about your visit?  Team Line  [phone]   Urgent Care - [place] [facility] - 11am-9pm Monday-Friday Saturday-Sunday- 9am-5pm   [place] - 5pm-9pm Monday-Friday Saturday-Sunday- 9am-5pm  [phone] - [place]   [phone] - [place]       What options do I have for visits at the clinic other than the traditional office visit?  To expand how we care for you, many of our providers are utilizing electronic visits (e-visits) and telephone visits, when medically appropriate, for interactions with their patients rather than a visit in the clinic.   We also offer nurse visits for many medical concerns. Just like any other service, we will bill your insurance company for this type of visit based on time spent on the phone with your provider. Not all insurance companies cover these visits. Please check with your medical insurance if this type of visit is covered. You will be responsible for any charges that are not paid by your insurance.      E-visits via Cinexio:  generally incur a $35.00 fee.  Telephone visits:  Time spent on the phone: *charged based on time that is spent on the phone in increments of 10 minutes. Estimated cost:   5-10 mins $30.00   11-20 mins.  $59.00   21-30 mins. $85.00   Use Healthkarthart (secure email communication and access to your chart) to send your primary care provider a message or make an appointment. Ask someone on your Team how to sign up for Buckt.    For a Price Quote for your services, please call our Consumer Price Line at [phone].    As always, Thank you for trusting us with your health care needs!    Discharged by Patricia HAMILTON CMA ([facility])            Follow-ups after your visit        Who to contact     If you have questions or need follow up information about today's clinic visit or your schedule please contact [facility] directly at [phone].  Normal or non-critical lab and imaging results will be communicated to you by MyChart, letter or phone within 4 business days after the clinic has received the results. If you do not hear from us within 7 days, please contact the clinic through Healthkarthart or phone. If you have a critical or abnormal lab result, we will notify you by phone as soon as possible.  Submit refill requests through Fashion [employer] You or call your pharmacy and they will forward the refill request to us. Please allow 3 business days for your refill to be completed.          Additional Information About Your Visit        Healthkarthart Information     Buckt gives you secure access to your electronic health record. If you see a primary care provider, you can also send messages to your care team and make appointments. If you have questions, please call your primary care clinic.  If you do not have a primary care provider, please call [phone] and they will assist you.        Care EveryWhere ID     This is your Care EveryWhere ID. This could be used by other organizations to access your [place] medical records  [id number]        Your Vitals Were     Pulse Temperature Last Period Pulse Oximetry Breastfeeding? BMI (Body Mass Index)    72 98.2  F (36.8  C) (Oral) 11/15/2017 (Approximate) 100% No 20.63 kg/m2       Blood Pressure  from Last 3 Encounters:   11/24/17 118/78   10/27/17 109/72   09/29/17 121/82    Weight from Last 3 Encounters:   11/24/17 124 lb (56.2 kg)   10/27/17 124 lb 6.4 oz (56.4 kg)   09/29/17 122 lb (55.3 kg)              Today, you had the following     No orders found for display         Today's Medication Changes          These changes are accurate as of: 11/24/17  3:04 PM.  If you have any questions, ask your nurse or doctor.               Start taking these medicines.        Dose/Directions    amoxicillin-clavulanate 875-125 MG per tablet   Commonly known as:  AUGMENTIN   Used for:  Acute recurrent maxillary sinusitis   Started by:  Mavis Watters APRN CNP        Dose:  1 tablet   Take 1 tablet by mouth 2 times daily for 10 days   Quantity:  20 tablet   Refills:  0       methylPREDNISolone 4 MG tablet   Commonly known as:  MEDROL DOSEPAK   Used for:  Acute recurrent maxillary sinusitis   Started by:  Mavis Watters APRN CNP        Follow package instructions   Quantity:  21 tablet   Refills:  0            Where to get your medicines      These medications were sent to [facility] [place]  [place], [place] - [address]  [address], [facility] [address]     Phone:  [phone]     amoxicillin-clavulanate 875-125 MG per tablet    methylPREDNISolone 4 MG tablet                Primary Care Provider Office Phone # Fax #    Leo Ceja [phone] [address] [facility] [address]        Equal Access to Services     [facility] JALEN AH: Hadii aad ku hadasho Soomaali, waaxda luqadaha, qaybta kaalmada adeegyada, waxay tenisha larkin. So [facility] [phone].    ATENCIÓN: Si habla jeremy, tiene a restrepo disposición servicios gratuitos de asistencia lingüística. Llame al [phone].    We comply with applicable federal civil rights laws and [place] laws. We do not discriminate on the basis of race, color, national origin, age,  disability, sex, sexual orientation, or gender identity.            Thank you!     Thank you for choosing [facility]  for your care. Our goal is always to provide you with excellent care. Hearing back from our patients is one way we can continue to improve our services. Please take a few minutes to complete the written survey that you may receive in the mail after your visit with us. Thank you!             Your Updated Medication List - Protect others around you: Learn how to safely use, store and throw away your medicines at www.disposemymeds.org.          This list is accurate as of: 11/24/17  3:04 PM.  Always use your most recent med list.                   Brand Name Dispense Instructions for use Diagnosis    amoxicillin-clavulanate 875-125 MG per tablet    AUGMENTIN    20 tablet    Take 1 tablet by mouth 2 times daily for 10 days    Acute recurrent maxillary sinusitis       fluticasone 27.5 MCG/SPRAY spray    VERAMYST     Spray 2 sprays into both nostrils daily        methylPREDNISolone 4 MG tablet    MEDROL DOSEPAK    21 tablet    Follow package instructions    Acute recurrent maxillary sinusitis       MULTIVITAMIN ADULT PO           PROBIOTIC ADVANCED Caps      Take 2 capsules by mouth daily

## 2017-12-04 LAB
BILE AC SERPL-SCNC: 2.4 UMOL/L
CDCAE SERPL-SCNC: 2.8 UMOL/L (ref 0–3.4)
CHOLATE SERPL-SCNC: 8.3 UMOL/L
DO-CHOLATE SERPL-SCNC: 2.8 UMOL/L
URSODEOXYCHOLATE SERPL-SCNC: 0.3 UMOL/L (ref 0–1)

## 2017-12-07 ENCOUNTER — VIRTUAL VISIT (OUTPATIENT)
Dept: FAMILY MEDICINE | Facility: OTHER | Age: 32
End: 2017-12-07

## 2017-12-07 NOTE — PROGRESS NOTES
"Date:   Clinician: Eusebia Laureano  Clinician NPI: [id number]  Patient: Mavis Krishnamurthy  Patient : 1985  Patient Address: [address] [place] [address]  Patient Phone: [phone]  Visit Protocol: Yeast infection  Patient Summary:  Mavis is a 32 year old ( : 1985 ) female who initiated a Visit for a presumed vaginal yeast infection. When asked the question \"Please sign me up to receive news, health information and promotions. \", Patient responded \"Yes\".   A synchronous visit is necessary because the patient reported the following abnormal symptoms:   Already using anti-fungals   0-5 days ago, she began noticing perivulvar pruritus.   She denies having open sores, perivulvar rash, vaginal pruritus, vaginal discharge, and abdominal pain. She also denies feeling feverish.   She has had two (2) occurrences in the past year and the current symptoms are similar to previous yeast infections. She has attempted to treat her symptoms with anti-fungal suppositories for yeast infections and anti-fungal cream for yeast infections.   She is currently taking or has taken antibiotics in the past 2 weeks. She prefers a fluconazole (Diflucan) Pill.   She denies pregnancy and denies breastfeeding. She has menstruated in the past month.   She denies risk factors for sexually transmitted infections. She does NOT smoke or use smokeless tobacco.   Additional information as reported by the patient (free text): Was just on antibiotics for a sinus infection tried the otc kit Monday but still have itching    MEDICATIONS:  No current medications   , ALLERGIES:  NKDA   Clinician Response:  Dear Mavis,  Based on the information you have provided, you likely have a vaginal yeast infection which is a common infection of the vagina caused by a fungus.  I am prescribing:   Fluconazole (Diflucan) 150 mg oral tablet to treat your yeast infection. Swallow one (1) tablet as a single dose. There are no refills " with this prescription.   While you have yeast infection symptoms, do the following:      Avoid irritants such as scented bath products, tampons, pads, or vaginal sprays and powders.    Avoid douching.    Wear cotton underwear and if you are comfortable doing so, do not wear underwear to bed.    Avoid hot tubs and whirlpool spas.     Most women notice improvement in their symptoms within 1-2 days after starting treatment with complete clearing in 5-7 days. If your symptoms have not improved in 3 days or not resolved in 10 days, please schedule an appointment to see your primary care provider for an evaluation as your symptoms may be caused by an infection other than yeast. Sometimes yeast infections can be associated with other vaginal infections or with sexually transmitted infections (STIs). If you think you may be at risk for a STI please be seen in a clinic.   Diagnosis: Candida Vulvovaginitis  Diagnosis ICD: B37.3  Triage Notes: Verified name and , clarified symptoms, using OTC WITHOUT RELIEF, was just on oral antibiotics for sinus  Synchronous Triage: phone, status: completed, duration: 89 seconds  Prescription: fluconazole (Diflucan) 150mg oral tablet 1 tablet, 1 days supply. Take one tablet by mouth one time a day for 1 day. Refills: 0, Refill as needed: no, Allow substitutions: yes  Pharmacy: [id number] - [phone] - [address] [place] [address]

## 2017-12-08 ENCOUNTER — OFFICE VISIT (OUTPATIENT)
Dept: OTOLARYNGOLOGY | Facility: CLINIC | Age: 32
End: 2017-12-08
Payer: COMMERCIAL

## 2017-12-08 VITALS — RESPIRATION RATE: 14 BRPM | WEIGHT: 124 LBS | BODY MASS INDEX: 20.66 KG/M2 | HEIGHT: 65 IN

## 2017-12-08 DIAGNOSIS — J32.4 CHRONIC PANSINUSITIS: Primary | ICD-10-CM

## 2017-12-08 DIAGNOSIS — J33.9 NASAL POLYP: ICD-10-CM

## 2017-12-08 PROCEDURE — 99214 OFFICE O/P EST MOD 30 MIN: CPT | Mod: 25 | Performed by: OTOLARYNGOLOGY

## 2017-12-08 PROCEDURE — 31231 NASAL ENDOSCOPY DX: CPT | Performed by: OTOLARYNGOLOGY

## 2017-12-08 RX ORDER — DOXYCYCLINE 100 MG/1
100 TABLET ORAL 2 TIMES DAILY
Qty: 28 TABLET | Refills: 0 | Status: SHIPPED | OUTPATIENT
Start: 2017-12-08 | End: 2017-12-22

## 2017-12-08 RX ORDER — PREDNISONE 20 MG/1
TABLET ORAL
Qty: 17 TABLET | Refills: 0 | Status: SHIPPED | OUTPATIENT
Start: 2017-12-08 | End: 2018-01-10

## 2017-12-08 ASSESSMENT — PAIN SCALES - GENERAL: PAINLEVEL: NO PAIN (0)

## 2017-12-08 NOTE — PROGRESS NOTES
Chief Complaint - throat recheck, sinus concern    History of Present Illness - Mavis Krishnamurthy is a 32 year old female who returns with a history of feeling like something is in the back of the throat, low throat or upper neck. She had Inlet patch in esophagus that produces acid. Had this burned at [facility], and now her symptoms are gone.     Now has sinusitis. Bad smell in nose. Some sinus headaches. Was treated with augmentin and medrol dosepack. In 2013 had a fungus ball and nasal polyps and had sinus surgery in both sides. Now has persistent thick, peanut butter stiff she blows out of nose, left side. No pain in face, but some forehead pain, no bleeding.     Past Medical History -   Patient Active Problem List   Diagnosis     CARDIOVASCULAR SCREENING; LDL GOAL LESS THAN 160     Allergic rhinitis     IBS (irritable bowel syndrome)     Chronic maxillary sinusitis     Lactose intolerance     Migraine     Abnormal antinuclear antibody titer     Bicuspid aortic valve       Current Medications -   Current Outpatient Prescriptions:      fluticasone (VERAMYST) 27.5 MCG/SPRAY spray, Spray 2 sprays into both nostrils daily, Disp: , Rfl:      methylPREDNISolone (MEDROL DOSEPAK) 4 MG tablet, Follow package instructions, Disp: 21 tablet, Rfl: 0     Multiple Vitamins-Minerals (MULTIVITAMIN ADULT PO), , Disp: , Rfl:      Probiotic Product (PROBIOTIC ADVANCED) CAPS, Take 2 capsules by mouth daily, Disp: , Rfl:     Allergies -   Allergies   Allergen Reactions     Mold      Seasonal Allergies        Social History -   Social History     Social History     Marital status:      Spouse name: Moo     Number of children: 1     Years of education: 16     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 No     Social History Narrative       Family History -   Family History   Problem Relation Age of Onset     Hypertension Mother      HEART DISEASE Father      open heart for endocarditis     Neurologic Disorder Father 16     migraines     Alzheimer Disease Maternal Grandmother 80     Neurologic Disorder Paternal Grandmother      migraines unknown age     Neurologic Disorder Sister 20     migraines       Review of Systems - As per HPI and PMHx, otherwise 7 system review is negative.    Physical Exam  Resp 14  Ht 1.651 m (5' 5\")  Wt 56.2 kg (124 lb)  LMP 11/15/2017 (Approximate)  BMI 20.63 kg/m2  General - The patient is in no distress. Alert and oriented to person and place, answers questions and cooperates with examination appropriately.   Voice and Breathing - The patient was breathing comfortably without the use of accessory muscles. There was no wheezing, stridor, or stertor.  The patients voice was clear and strong.  Eyes - Extraocular movements intact.  Sclera were not icteric or injected, conjunctiva were pink and moist.  Nose - External contour is symmetric, no gross deflection or scars.  Nasal mucosa is pink and moist some congestion.  The septum was midline and non-obstructive. Some thicker mucous left side.  Neck -  Palpation of the occipital, submental, submandibular, internal jugular chain, and supraclavicular nodes did not demonstrate any abnormal lymph nodes or masses. No parotid masses. Palpation of the thyroid was soft and smooth, it maybe enlarged diffusely however.  The trachea was mobile and midline.  Neurologic - CN II-XII are grossly intact, no focal neurologic deficits.     To further evaluate the nasal cavity, I performed flexible nasal endoscopy, and color photographs were taken for the permanent medical record.  I first sprayed the nasal cavity bilaterally with a mix of lidocaine and neosynephrine.  I then began on the right side using an adult flexible " endoscope.  The septum was straight.  The right middle turbinate and middle meatus were clearly visualized and somewhat edematous in appearance, but no pus. Right maxillary antrostomy was small, polypoid opening. The sphenoethmoid recess was also abnormal in appearance, with copious secretions and polypoid edema.    I then turned my attention to the left side. The left middle turbinate and middle meatus were clearly visualized and edematous and polypoid in appearance. Some pus from the frontal or anterior ethmoid was coming down. Polyp in the middle meatus. Left maxillary was polypoid, but no pus. I was able to get limited look at the posterior nasal cavity and sphenoethmoid recess.  It also had excess secretions and polypoid degeneration of the mucosa.      A/P - Mavis Krishnamurthy is a 32 year old female with throat symptoms, some pain and some globus sensation. This was due to an esophageal inlet that was cauterized by GI. She is much better.     Has chronic sinusitis with nasal polyps. Had one surgery elsewhere. Has polyp return, R>L. May have left frontal sinus and ethmoid sinusitis with darker drainage. I recommend doxycycline and prednisone and nasal saline irrigations and flonase. May need to get a sinus CT if this doesn't clear. She will message me after treatment.     Dr. Obie Jameson  Otolaryngology  [facility]

## 2017-12-08 NOTE — NURSING NOTE
"Chief Complaint   Patient presents with     Sinus Problem     chronic sinusitis and has had sinus surgery in the past       Initial Resp 14  Ht 1.651 m (5' 5\")  Wt 56.2 kg (124 lb)  LMP 11/15/2017 (Approximate)  BMI 20.63 kg/m2 Estimated body mass index is 20.63 kg/(m^2) as calculated from the following:    Height as of this encounter: 1.651 m (5' 5\").    Weight as of this encounter: 56.2 kg (124 lb).  Medication Reconciliation: complete   Sammy Rg MA          "

## 2017-12-08 NOTE — MR AVS SNAPSHOT
After Visit Summary   12/8/2017    Mavis Krishnamurthy    MRN: [id number]           Patient Information     Date Of Birth          1985        Visit Information        Provider Department      12/8/2017 2:30 PM Obie Jameson MD [facility]        Today's Diagnoses     Chronic pansinusitis    -  1    Nasal polyp          Care Instructions    General Scheduling Information  To schedule your CT/MRI scan, please contact [place] [facility] at [phone] [address] [place] [address]    To schedule your Surgery, please contact our Specialty Schedulers at [phone]    ENT Clinic Locations Clinic Hours Telephone Number     [facility]  [address]  [place] [address]   Tuesday:       8:00am -- 4:00pm    Wednesday:  8:00am - 4:00pm   To schedule an appointment with   Dr. Jameson,   please contact our   Specialty Scheduling Department at:     [phone]       [facility]  [address] [place] [address]   Friday:          8:00am - 4:00pm         Urgent Care Locations Clinic Hours Telephone Numbers     [facility]  [address] [place] [address]     Monday-Friday:     11:00pm - 9:00pm    Saturday-Sunday:  9:00am - 5:00pm   [phone]     [facility]  [address]   [place] [address]     Monday-Friday:      5:00pm - 9:00pm     Saturday-Sunday:  9:00am - 5:00pm   [phone]                 Follow-ups after your visit        Who to contact     If you have questions or need follow up information about today's clinic visit or your schedule please contact [facility] directly at [phone].  Normal or non-critical lab and imaging results will be communicated to you by MyChart, letter or phone within 4 business days after the clinic has received the results. If you do not hear from us within 7 days, please contact the clinic through MyChart or phone. If you have a critical or abnormal lab result, we will  "notify you by phone as soon as possible.  Submit refill requests through INNFOCUS or call your pharmacy and they will forward the refill request to us. Please allow 3 business days for your refill to be completed.          Additional Information About Your Visit        Site LockharMemonic Information     INNFOCUS gives you secure access to your electronic health record. If you see a primary care provider, you can also send messages to your care team and make appointments. If you have questions, please call your primary care clinic.  If you do not have a primary care provider, please call [phone] and they will assist you.        Care EveryWhere ID     This is your Care EveryWhere ID. This could be used by other organizations to access your [facility] medical records  [id number]        Your Vitals Were     Respirations Height Last Period BMI (Body Mass Index)          14 1.651 m (5' 5\") 11/15/2017 (Approximate) 20.63 kg/m2         Blood Pressure from Last 3 Encounters:   11/24/17 118/78   10/27/17 109/72   09/29/17 121/82    Weight from Last 3 Encounters:   12/08/17 56.2 kg (124 lb)   11/24/17 56.2 kg (124 lb)   10/27/17 56.4 kg (124 lb 6.4 oz)              We Performed the Following     Nasal Endoscopy, Diag          Today's Medication Changes          These changes are accurate as of: 12/8/17  5:06 PM.  If you have any questions, ask your nurse or doctor.               Start taking these medicines.        Dose/Directions    doxycycline Monohydrate 100 MG Tabs   Used for:  Chronic pansinusitis, Nasal polyp   Started by:  Obie Jameson MD        Dose:  100 mg   Take 100 mg by mouth 2 times daily for 14 days   Quantity:  28 tablet   Refills:  0       predniSONE 20 MG tablet   Commonly known as:  DELTASONE   Used for:  Chronic pansinusitis, Nasal polyp   Started by:  Obie Jameson MD        Take 40 mg daily for 6 days, 20 mg daily for 3 days, 10 mg daily for 3 days   Quantity:  17 tablet   Refills:  0          "   Where to get your medicines      These medications were sent to [id number] [facility] - [place], [place] - [address] [facility]  [address] [facility] [address]     Phone:  [phone]     doxycycline Monohydrate 100 MG Tabs    predniSONE 20 MG tablet                Primary Care Provider Office Phone # Fax #    Leo Tariq Ceja [phone]       [address] [facility] [address]        Equal Access to Services     ROGELIO RAO : Hadii aad ku hadasho Soomaali, waaxda luqadaha, qaybta kaalmada adeegyada, waxay idiin hayaan adeeg kharash la'john . So [facility] [phone].    ATENCIÓN: Si habla español, tiene a restrepo disposición servicios gratuitos de asistencia lingüística. [facility] [phone].    We comply with applicable federal civil rights laws and [place] laws. We do not discriminate on the basis of race, color, national origin, age, disability, sex, sexual orientation, or gender identity.            Thank you!     Thank you for choosing [facility]  for your care. Our goal is always to provide you with excellent care. Hearing back from our patients is one way we can continue to improve our services. Please take a few minutes to complete the written survey that you may receive in the mail after your visit with us. Thank you!             Your Updated Medication List - Protect others around you: Learn how to safely use, store and throw away your medicines at www.disposemymeds.org.          This list is accurate as of: 12/8/17  5:06 PM.  Always use your most recent med list.                   Brand Name Dispense Instructions for use Diagnosis    doxycycline Monohydrate 100 MG Tabs     28 tablet    Take 100 mg by mouth 2 times daily for 14 days    Chronic pansinusitis, Nasal polyp       fluticasone 27.5 MCG/SPRAY spray    VERAMYST     Spray 2 sprays into both nostrils daily        methylPREDNISolone 4 MG tablet    MEDROL DOSEPAK    21 tablet    Follow package instructions     Acute recurrent maxillary sinusitis       MULTIVITAMIN ADULT PO           predniSONE 20 MG tablet    DELTASONE    17 tablet    Take 40 mg daily for 6 days, 20 mg daily for 3 days, 10 mg daily for 3 days    Chronic pansinusitis, Nasal polyp       PROBIOTIC ADVANCED Caps      Take 2 capsules by mouth daily

## 2017-12-08 NOTE — LETTER
12/8/2017         RE: Mavis Krishnamurthy  [address] [facility] [address]        Dear Colleague,    Thank you for referring your patient, Mavis Krishnamurthy, to the [facility] [place] [facility]. Please see a copy of my visit note below.    Chief Complaint - throat recheck, sinus concern    History of Present Illness - Mavis Krishnamurthy is a 32 year old female who returns with a history of feeling like something is in the back of the throat, low throat or upper neck. She had Inlet patch in esophagus that produces acid. Had this burned at [facility], and now her symptoms are gone.     Now has sinusitis. Bad smell in nose. Some sinus headaches. Was treated with augmentin and medrol dosepack. In 2013 had a fungus ball and nasal polyps and had sinus surgery in both sides. Now has persistent thick, peanut butter stiff she blows out of nose, left side. No pain in face, but some forehead pain, no bleeding.     Past Medical History -   Patient Active Problem List   Diagnosis     CARDIOVASCULAR SCREENING; LDL GOAL LESS THAN 160     Allergic rhinitis     IBS (irritable bowel syndrome)     Chronic maxillary sinusitis     Lactose intolerance     Migraine     Abnormal antinuclear antibody titer     Bicuspid aortic valve       Current Medications -   Current Outpatient Prescriptions:      fluticasone (VERAMYST) 27.5 MCG/SPRAY spray, Spray 2 sprays into both nostrils daily, Disp: , Rfl:      methylPREDNISolone (MEDROL DOSEPAK) 4 MG tablet, Follow package instructions, Disp: 21 tablet, Rfl: 0     Multiple Vitamins-Minerals (MULTIVITAMIN ADULT PO), , Disp: , Rfl:      Probiotic Product (PROBIOTIC ADVANCED) CAPS, Take 2 capsules by mouth daily, Disp: , Rfl:     Allergies -   Allergies   Allergen Reactions     Mold      Seasonal Allergies        Social History -   Social History     Social History     Marital status:      Spouse name: Moo     Number of children: 1     Years of education: 16     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 No     Social History Narrative       Family History -   Family History   Problem Relation Age of Onset     Hypertension Mother      HEART DISEASE Father      open heart for endocarditis     Neurologic Disorder Father 16     migraines     Alzheimer Disease Maternal Grandmother 80     Neurologic Disorder Paternal Grandmother      migraines unknown age     Neurologic Disorder Sister 20     migraines       Review of Systems - As per HPI and PMHx, otherwise 7 system review is negative.    Physical Exam  Resp 14  Ht 1.651 m (5' 5\")  Wt 56.2 kg (124 lb)  LMP 11/15/2017 (Approximate)  BMI 20.63 kg/m2  General - The patient is in no distress. Alert and oriented to person and place, answers questions and cooperates with examination appropriately.   Voice and Breathing - The patient was breathing comfortably without the use of accessory muscles. There was no wheezing, stridor, or stertor.  The patients voice was clear and strong.  Eyes - Extraocular movements intact.  Sclera were not icteric or injected, conjunctiva were pink and moist.  Nose - External contour is symmetric, no gross deflection or scars.  Nasal mucosa is pink and moist some congestion.  The septum was midline and non-obstructive. Some thicker mucous left side.  Neck -  Palpation of the occipital, submental, submandibular, internal jugular chain, and supraclavicular nodes did not demonstrate any abnormal lymph nodes or masses. No parotid masses. Palpation of the thyroid was soft and smooth, it maybe enlarged diffusely however.  The trachea was mobile and midline.  Neurologic - CN II-XII are grossly intact, no focal neurologic deficits.     To further evaluate the nasal cavity, I " performed flexible nasal endoscopy, and color photographs were taken for the permanent medical record.  I first sprayed the nasal cavity bilaterally with a mix of lidocaine and neosynephrine.  I then began on the right side using an adult flexible endoscope.  The septum was straight.  The right middle turbinate and middle meatus were clearly visualized and somewhat edematous in appearance, but no pus. Right maxillary antrostomy was small, polypoid opening. The sphenoethmoid recess was also abnormal in appearance, with copious secretions and polypoid edema.    I then turned my attention to the left side. The left middle turbinate and middle meatus were clearly visualized and edematous and polypoid in appearance. Some pus from the frontal or anterior ethmoid was coming down. Polyp in the middle meatus. Left maxillary was polypoid, but no pus. I was able to get limited look at the posterior nasal cavity and sphenoethmoid recess.  It also had excess secretions and polypoid degeneration of the mucosa.      A/P - Mavis Krishnamurthy is a 32 year old female with throat symptoms, some pain and some globus sensation. This was due to an esophageal inlet that was cauterized by GI. She is much better.     Has chronic sinusitis with nasal polyps. Had one surgery elsewhere. Has polyp return, R>L. May have left frontal sinus and ethmoid sinusitis with darker drainage. I recommend doxycycline and prednisone and nasal saline irrigations and flonase. May need to get a sinus CT if this doesn't clear. She will message me after treatment.     Dr. Obie Jameson  Otolaryngology  [facility]    Again, thank you for allowing me to participate in the care of your patient.        Sincerely,        Obie Jameson MD

## 2017-12-08 NOTE — PATIENT INSTRUCTIONS
General Scheduling Information  To schedule your CT/MRI scan, please contact [place] [facility] at [phone]   [address] [place] [address]    To schedule your Surgery, please contact our Specialty Schedulers at [phone]    ENT Clinic Locations Clinic Hours Telephone Number     [facility]  [address] [place] [address]   Tuesday:       8:00am -- 4:00pm    Wednesday:  8:00am - 4:00pm   To schedule an appointment with   Dr. Jameson,   please contact our   Specialty Scheduling Department at:     [phone]       [facility]  [address] [place] [address]   Friday:          8:00am - 4:00pm         Urgent Care Locations Clinic Hours Telephone Numbers     [facility]  [address] [place] [address]     Monday-Friday:     11:00pm - 9:00pm    Saturday-Sunday:  9:00am - 5:00pm   [phone]     Eduar Cornejo  [address] [place] [address]     Monday-Friday:      5:00pm - 9:00pm     Saturday-Sunday:  9:00am - 5:00pm   [phone]

## 2018-01-09 ENCOUNTER — MYC MEDICAL ADVICE (OUTPATIENT)
Dept: OBGYN | Facility: CLINIC | Age: 33
End: 2018-01-09

## 2018-01-09 ENCOUNTER — E-VISIT (OUTPATIENT)
Dept: OBGYN | Facility: CLINIC | Age: 33
End: 2018-01-09
Payer: COMMERCIAL

## 2018-01-09 DIAGNOSIS — N76.0 VAGINITIS AND VULVOVAGINITIS: Primary | ICD-10-CM

## 2018-01-09 RX ORDER — FLUCONAZOLE 150 MG/1
150 TABLET ORAL ONCE
Qty: 1 TABLET | Refills: 1 | Status: SHIPPED | OUTPATIENT
Start: 2018-01-09 | End: 2018-01-09

## 2018-01-10 ENCOUNTER — OFFICE VISIT (OUTPATIENT)
Dept: OBGYN | Facility: CLINIC | Age: 33
End: 2018-01-10
Payer: COMMERCIAL

## 2018-01-10 VITALS
DIASTOLIC BLOOD PRESSURE: 63 MMHG | TEMPERATURE: 98.7 F | BODY MASS INDEX: 20.39 KG/M2 | HEART RATE: 104 BPM | HEIGHT: 65 IN | WEIGHT: 122.4 LBS | SYSTOLIC BLOOD PRESSURE: 114 MMHG

## 2018-01-10 DIAGNOSIS — N90.9: Primary | ICD-10-CM

## 2018-01-10 PROCEDURE — 99213 OFFICE O/P EST LOW 20 MIN: CPT | Performed by: NURSE PRACTITIONER

## 2018-01-10 RX ORDER — CLOBETASOL PROPIONATE 0.5 MG/G
OINTMENT TOPICAL
Qty: 45 G | Refills: 0 | Status: SHIPPED | OUTPATIENT
Start: 2018-01-10 | End: 2018-06-18

## 2018-01-10 ASSESSMENT — PAIN SCALES - GENERAL: PAINLEVEL: NO PAIN (0)
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
## 2018-01-10 NOTE — NURSING NOTE
"Chief Complaint   Patient presents with     Vaginal Problem       Initial /63  Pulse 104  Temp 98.7  F (37.1  C) (Oral)  Ht 5' 5\" (1.651 m)  Wt 122 lb 6.4 oz (55.5 kg)  LMP 12/12/2017 (Exact Date)  BMI 20.37 kg/m2 Estimated body mass index is 20.37 kg/(m^2) as calculated from the following:    Height as of this encounter: 5' 5\" (1.651 m).    Weight as of this encounter: 122 lb 6.4 oz (55.5 kg)..  BP completed using cuff size: thang Sellers CMA    "

## 2018-01-10 NOTE — MR AVS SNAPSHOT
"              After Visit Summary   1/10/2018    Mavis Krishnamurthy    MRN: [id number]           Patient Information     Date Of Birth          1985        Visit Information        Provider Department      1/10/2018 3:50 PM Edilia Nicole APRN CNP [facility]        Today's Diagnoses     Disorder of vulva    -  1       Follow-ups after your visit        Who to contact     If you have questions or need follow up information about today's clinic visit or your schedule please contact [facility] directly at [phone].  Normal or non-critical lab and imaging results will be communicated to you by [employer], letter or phone within 4 business days after the clinic has received the results. If you do not hear from us within 7 days, please contact the clinic through [employer] or phone. If you have a critical or abnormal lab result, we will notify you by phone as soon as possible.  Submit refill requests through [employer] or call your pharmacy and they will forward the refill request to us. Please allow 3 business days for your refill to be completed.          Additional Information About Your Visit        MyChart Information     [employer] gives you secure access to your electronic health record. If you see a primary care provider, you can also send messages to your care team and make appointments. If you have questions, please call your primary care clinic.  If you do not have a primary care provider, please call [phone] and they will assist you.        Care EveryWhere ID     This is your Care EveryWhere ID. This could be used by other organizations to access your [place] medical records  [id number]        Your Vitals Were     Pulse Temperature Height Last Period BMI (Body Mass Index)       104 98.7  F (37.1  C) (Oral) 5' 5\" (1.651 m) 12/12/2017 (Exact Date) 20.37 kg/m2        Blood Pressure from Last 3 Encounters:   01/10/18 114/63   11/24/17 118/78   10/27/17 109/72    Weight from Last " 3 Encounters:   01/10/18 122 lb 6.4 oz (55.5 kg)   12/08/17 124 lb (56.2 kg)   11/24/17 124 lb (56.2 kg)              Today, you had the following     No orders found for display         Today's Medication Changes          These changes are accurate as of: 1/10/18  4:05 PM.  If you have any questions, ask your nurse or doctor.               Start taking these medicines.        Dose/Directions    clobetasol 0.05 % ointment   Commonly known as:  TEMOVATE   Used for:  Disorder of vulva   Started by:  Edilia Nicole APRN CNP        Apply sparingly to affected area twice daily as needed.  Do not apply to face.   Quantity:  45 g   Refills:  0         Stop taking these medicines if you haven't already. Please contact your care team if you have questions.     fluticasone 27.5 MCG/SPRAY spray   Commonly known as:  VERAMYST   Stopped by:  Edilia Nicole APRN CNP           methylPREDNISolone 4 MG tablet   Commonly known as:  MEDROL DOSEPAK   Stopped by:  Edilia Nicole APRN CNP           MULTIVITAMIN ADULT PO   Stopped by:  Edilia Nicole APRN CNP           predniSONE 20 MG tablet   Commonly known as:  DELTASONE   Stopped by:  Edilia Nicole APRN CNP                Where to get your medicines      These medications were sent to [facility] [address] [place], [place] - [address]  [address] [facility] [address]     Phone:  [phone]     clobetasol 0.05 % ointment                Primary Care Provider Office Phone # Fax #    Leo Ceja [phone] [address] [facility] [address]        Equal Access to Services     [facility] : Hadamber Hines, wasocratesda luconstance, qaybta kaalmanatasha farfan. So [facility] [phone].    ATENCIÓN: Si habla español, tiene a restrepo disposición servicios gratuitos de asistencia lingüística. Llame al [phone].    We comply with applicable federal civil  rights laws and [place] laws. We do not discriminate on the basis of race, color, national origin, age, disability, sex, sexual orientation, or gender identity.            Thank you!     Thank you for choosing [facility]  for your care. Our goal is always to provide you with excellent care. Hearing back from our patients is one way we can continue to improve our services. Please take a few minutes to complete the written survey that you may receive in the mail after your visit with us. Thank you!             Your Updated Medication List - Protect others around you: Learn how to safely use, store and throw away your medicines at www.disposemymeds.org.          This list is accurate as of: 1/10/18  4:05 PM.  Always use your most recent med list.                   Brand Name Dispense Instructions for use Diagnosis    clobetasol 0.05 % ointment    TEMOVATE    45 g    Apply sparingly to affected area twice daily as needed.  Do not apply to face.    Disorder of vulva       PROBIOTIC ADVANCED Caps      Take 2 capsules by mouth daily

## 2018-04-06 NOTE — PROGRESS NOTES
SUBJECTIVE:                                                    Mavis Krishnamurthy is a 32 year old female who presents to clinic today for the following health issues:      Musculoskeletal problem/pain      Duration: 1 Day started at 10am today while changing her 10 day old infant on the floor. Stood up and noted sudden left neck pain.    Description  Location: Neck     Intensity:  moderate, 6/10    Accompanying signs and symptoms: none    History  Previous similar problem: no   Previous evaluation:  none    Precipitating or alleviating factors:  Trauma or overuse: no   Aggravating factors include: none    Therapies tried and outcome: nothing     Ibuprofen/heat no help.  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 Devices (BREAST PUMP) MISC, 1 each as needed, Disp: 1 each, Rfl: 0     Probiotic Product (PROBIOTIC ADVANCED) CAPS, Take 2 capsules by mouth daily, Disp: , Rfl:      Prenatal Vit-Fe Fumarate-FA (PRENATAL PO), , Disp: , Rfl:     Past Surgical History:   Procedure Laterality Date     COLONOSCOPY WITH CO2 INSUFFLATION N/A 6/18/2015    Procedure: COLONOSCOPY WITH CO2 INSUFFLATION;  Surgeon: Angel Thomas MD;  Location: [facility] OR     ENDOSCOPIC BALLOON SINUPLASTY, OPTICAL TRACKING SYSTEM ENDOSCOPIC SINUS SURGERY, COMBINED  11/7/2013    Procedure: COMBINED ENDOSCOPIC BALLOON SINUPLASTY, OPTICAL TRACKING SYSTEM ENDOSCOPIC SINUS SURGERY;  Bilateral Endoscopic Ethmoidectomy, Maxillary Antrostomy, Frontal Sinusototmy with Navigation and Balloon and Propel  Problem: Patient Care Overview  Goal: Plan of Care/Patient Progress Review  Outcome: Improving  Focus: Patient Care and Pain.  D: Patient A&O times 4; VSS; LS clear and BS+; c/o headache and pain to left upper arm.  I: Declined pain med; repositioned for comfort; up with assist to bedpan.  A: Voided and stooled; dressings CD&I; hip abduction maintained.  P: Continue with patient care.       Implants;  Surgeon: Vasquez Corona MD;  Location: [facility] OR     EXAM OF VAGINA,COLPOSCOPY      X -2     MOUTH SURGERY  2009    [place] Teeth      PHOTOREFRACTIVE KERATECTOMY      Oct and Dec 2015     UPPER GI ENDOSCOPY         REVIEW OF SYSTEMS  General: negative for fever  Resp: negative for chest pain   CV: negative for chest pain  Musculoskeletal: as above  Neurologic: negative for ataxia,  one sided weakness,  paresthesias    PHYSICAL EXAM::  Vitals: /73  Pulse 86  Temp 99.3  F (37.4  C) (Oral)  Wt 132 lb (59.9 kg)  LMP 08/03/2016 (Exact Date)  BMI 21.97 kg/m2  BMI= Body mass index is 21.97 kg/(m^2).  Constitutional: healthy, alert and no acute distress   NECK::  No midline tenderness to palpation,  strength intact and equal b/l upper extremities, TAINA but pain when turning head side to side. Tender left posterior mid cervical paraspinous musculature  GAIT: intact  NEURO:Cranial nerves intact grossly  PSYCH: mentation appears normal and affect normal/bright      Impression: Neck pain, musculoskeletal, uncomplicated.     ICD-10-CM    1. Neck pain M54.2    2. Sprain of neck, initial encounter S13.9XXA        Plan: Tylenol, Aleve. Declines any sedating meds. Alternate heat and ice. F/U PCP prn.       Nicolasa Clark PA-C
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
## 2018-06-18 ENCOUNTER — RADIANT APPOINTMENT (OUTPATIENT)
Dept: GENERAL RADIOLOGY | Facility: CLINIC | Age: 33
End: 2018-06-18
Attending: PHYSICIAN ASSISTANT
Payer: COMMERCIAL

## 2018-06-18 ENCOUNTER — OFFICE VISIT (OUTPATIENT)
Dept: FAMILY MEDICINE | Facility: CLINIC | Age: 33
End: 2018-06-18
Payer: COMMERCIAL

## 2018-06-18 VITALS
HEART RATE: 92 BPM | SYSTOLIC BLOOD PRESSURE: 99 MMHG | DIASTOLIC BLOOD PRESSURE: 59 MMHG | OXYGEN SATURATION: 100 % | TEMPERATURE: 98 F | BODY MASS INDEX: 19.8 KG/M2 | RESPIRATION RATE: 16 BRPM | WEIGHT: 119 LBS

## 2018-06-18 DIAGNOSIS — R07.81 RIB PAIN ON RIGHT SIDE: ICD-10-CM

## 2018-06-18 DIAGNOSIS — M25.561 RIGHT KNEE PAIN, UNSPECIFIED CHRONICITY: Primary | ICD-10-CM

## 2018-06-18 PROCEDURE — 99214 OFFICE O/P EST MOD 30 MIN: CPT | Performed by: PHYSICIAN ASSISTANT

## 2018-06-18 PROCEDURE — 73562 X-RAY EXAM OF KNEE 3: CPT | Mod: RT

## 2018-06-18 NOTE — MR AVS SNAPSHOT
After Visit Summary   6/18/2018    Mavis Krishnamurthy    MRN: [id number]           Patient Information     Date Of Birth          1985        Visit Information        Provider Department      6/18/2018 7:20 AM Renetta Mejia PA-C [facility]        Today's Diagnoses     Right knee pain, unspecified chronicity    -  1    Rib pain on right side           Follow-ups after your visit        Additional Services     ORTHOPEDICS ADULT REFERRAL       Your provider has referred you to: [facility]: [facility] [phone]   [email or web address]    Please be aware that coverage of these services is subject to the terms and limitations of your health insurance plan.  Call member services at your health plan with any benefit or coverage questions.      Please bring the following to your appointment:    >>   Any x-rays, CTs or MRIs which have been performed.  Contact the facility where they were done to arrange for  prior to your scheduled appointment.    >>   List of current medications   >>   This referral request   >>   Any documents/labs given to you for this referral                  Who to contact     If you have questions or need follow up information about today's clinic visit or your schedule please contact [facility] directly at [phone].  Normal or non-critical lab and imaging results will be communicated to you by MyChart, letter or phone within 4 business days after the clinic has received the results. If you do not hear from us within 7 days, please contact the clinic through MyChart or phone. If you have a critical or abnormal lab result, we will notify you by phone as soon as possible.  Submit refill requests through [employer] or call your pharmacy and they will forward the refill request to us. Please allow 3 business days for your refill to be completed.          Additional Information About Your Visit         [employer] Information     [employer] gives you secure access to your electronic health record. If you see a primary care provider, you can also send messages to your care team and make appointments. If you have questions, please call your primary care clinic.  If you do not have a primary care provider, please call [phone] and they will assist you.        Care EveryWhere ID     This is your Care EveryWhere ID. This could be used by other organizations to access your Home medical records  [id number]        Your Vitals Were     Pulse Temperature Respirations Last Period Pulse Oximetry Breastfeeding?    92 98  F (36.7  C) (Oral) 16 06/08/2018 (Approximate) 100% No    BMI (Body Mass Index)                   19.8 kg/m2            Blood Pressure from Last 3 Encounters:   06/18/18 99/59   01/10/18 114/63   11/24/17 118/78    Weight from Last 3 Encounters:   06/18/18 119 lb (54 kg)   01/10/18 122 lb 6.4 oz (55.5 kg)   12/08/17 124 lb (56.2 kg)              We Performed the Following     ORTHOPEDICS ADULT REFERRAL        Primary Care Provider Office Phone # Fax #    Leo Tariq Ceja [phone] [address] [facility] [address]        Equal Access to Services     ROGELIO RAO : Hadii aad ku hadasho Soomaali, waaxda luqadaha, qaybta kaalmada adeegyada, waxay andrzejin haydannyn marjorie martinez latoño . So [facility] [phone].    ATENCIÓN: Si habla español, tiene a restrepo disposición servicios gratuitos de asistencia lingüística. Llkinjal al [phone].    We comply with applicable federal civil rights laws and [place] laws. We do not discriminate on the basis of race, color, national origin, age, disability, sex, sexual orientation, or gender identity.            Thank you!     Thank you for choosing [facility]  for your care. Our goal is always to provide you with excellent care. Hearing back from our patients is one way we can continue to improve our services. Please take a few minutes  to complete the written survey that you may receive in the mail after your visit with us. Thank you!             Your Updated Medication List - Protect others around you: Learn how to safely use, store and throw away your medicines at www.disposemymeds.org.      Notice  As of 6/18/2018  7:41 AM    You have not been prescribed any medications.

## 2018-06-18 NOTE — NURSING NOTE
"Chief Complaint   Patient presents with     Knee Pain     R knee/leg pain per pt on and off for a few weeks now on the back side of the knee to the calf area injured it in the fall per pt said sprained knee?     Health Maintenance     UTD     Flank Pain     R side pain per pt x 1 week now no known injury        Initial BP 99/59  Pulse 92  Temp 98  F (36.7  C) (Oral)  Resp 16  Wt 119 lb (54 kg)  SpO2 100%  Breastfeeding? No  BMI 19.8 kg/m2 Estimated body mass index is 19.8 kg/(m^2) as calculated from the following:    Height as of 1/10/18: 5' 5\" (1.651 m).    Weight as of this encounter: 119 lb (54 kg).  Medication Reconciliation: complete      Diane Contreras MA    "

## 2018-06-18 NOTE — PROGRESS NOTES
PLEASE CALL PATIENT WITH REFERRAL INFO OK TO LEAVE VM: Dear Mavis,      It was a pleasure to see you at your recent office visit.  Your test results are listed below.  Xray was negative. Please schedule f/u with ortho.         If you have any questions or concerns, please call the clinic at [phone].    Sincerely,  Renetta Mejia PA-C

## 2018-06-21 ENCOUNTER — OFFICE VISIT (OUTPATIENT)
Dept: ORTHOPEDICS | Facility: CLINIC | Age: 33
End: 2018-06-21
Payer: COMMERCIAL

## 2018-06-21 VITALS
OXYGEN SATURATION: 100 % | SYSTOLIC BLOOD PRESSURE: 96 MMHG | RESPIRATION RATE: 20 BRPM | BODY MASS INDEX: 20.97 KG/M2 | TEMPERATURE: 99 F | WEIGHT: 126 LBS | DIASTOLIC BLOOD PRESSURE: 66 MMHG | HEART RATE: 71 BPM

## 2018-06-21 DIAGNOSIS — I83.90 VARICOSE VEIN OF LEG: ICD-10-CM

## 2018-06-21 DIAGNOSIS — M79.661 RIGHT CALF PAIN: Primary | ICD-10-CM

## 2018-06-21 PROCEDURE — 99243 OFF/OP CNSLTJ NEW/EST LOW 30: CPT | Performed by: ORTHOPAEDIC SURGERY

## 2018-06-21 ASSESSMENT — PAIN SCALES - GENERAL: PAINLEVEL: MODERATE PAIN (4)
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
## 2018-06-21 NOTE — MR AVS SNAPSHOT
After Visit Summary   6/21/2018    Mavis Krishnamurthy    MRN: [id number]           Patient Information     Date Of Birth          1985        Visit Information        Provider Department      6/21/2018 1:45 PM Maciej Hickey MD [facility] [place] [facility]        Today's Diagnoses     Right calf pain    -  1    Varicose vein of leg           Follow-ups after your visit        Who to contact     If you have questions or need follow up information about today's clinic visit or your schedule please contact [facility] directly at [phone].  Normal or non-critical lab and imaging results will be communicated to you by Synaffixhart, letter or phone within 4 business days after the clinic has received the results. If you do not hear from us within 7 days, please contact the clinic through Edgart or phone. If you have a critical or abnormal lab result, we will notify you by phone as soon as possible.  Submit refill requests through Optify or call your pharmacy and they will forward the refill request to us. Please allow 3 business days for your refill to be completed.          Additional Information About Your Visit        MyChart Information     Optify gives you secure access to your electronic health record. If you see a primary care provider, you can also send messages to your care team and make appointments. If you have questions, please call your primary care clinic.  If you do not have a primary care provider, please call [phone] and they will assist you.        Care EveryWhere ID     This is your Care EveryWhere ID. This could be used by other organizations to access your [place] medical records  [id number]        Your Vitals Were     Pulse Temperature Respirations Last Period Pulse Oximetry BMI (Body Mass Index)    71 99  F (37.2  C) (Oral) 20 06/08/2018 (Approximate) 100% 20.97 kg/m2       Blood Pressure from Last 3 Encounters:   06/21/18 96/66   06/18/18 99/59   01/10/18  114/63    Weight from Last 3 Encounters:   06/21/18 57.2 kg (126 lb)   06/18/18 54 kg (119 lb)   01/10/18 55.5 kg (122 lb 6.4 oz)              Today, you had the following     No orders found for display       Primary Care Provider Office Phone # Fax #    Leo Tariq Ceja [phone] [address] [facility] [address]        Equal Access to Services     [facility]: Hadii aad ku hadasho Soomaali, waaxda luqadaha, qaybta kaalmada adeegyada, waxay idiin hayaan adeeg divinearaedna la'john . So [facility] [phone].    ATENCIÓN: Si habla español, tiene a restrepo disposición servicios gratuitos de asistencia lingüística. [facility] [phone].    We comply with applicable federal civil rights laws and [place] laws. We do not discriminate on the basis of race, color, national origin, age, disability, sex, sexual orientation, or gender identity.            Thank you!     Thank you for choosing [facility]  for your care. Our goal is always to provide you with excellent care. Hearing back from our patients is one way we can continue to improve our services. Please take a few minutes to complete the written survey that you may receive in the mail after your visit with us. Thank you!             Your Updated Medication List - Protect others around you: Learn how to safely use, store and throw away your medicines at www.disposemymeds.org.      Notice  As of 6/21/2018  2:51 PM    You have not been prescribed any medications.

## 2018-07-22 ENCOUNTER — TRANSFERRED RECORDS (OUTPATIENT)
Dept: HEALTH INFORMATION MANAGEMENT | Facility: CLINIC | Age: 33
End: 2018-07-22

## 2018-07-27 ENCOUNTER — OFFICE VISIT (OUTPATIENT)
Dept: OBGYN | Facility: CLINIC | Age: 33
End: 2018-07-27
Payer: COMMERCIAL

## 2018-07-27 VITALS
TEMPERATURE: 98.6 F | HEIGHT: 65 IN | BODY MASS INDEX: 19.73 KG/M2 | DIASTOLIC BLOOD PRESSURE: 73 MMHG | SYSTOLIC BLOOD PRESSURE: 106 MMHG | HEART RATE: 82 BPM | WEIGHT: 118.4 LBS | OXYGEN SATURATION: 100 %

## 2018-07-27 DIAGNOSIS — N91.2 ABSENCE OF MENSTRUATION: Primary | ICD-10-CM

## 2018-07-27 DIAGNOSIS — Z36.89 CONFIRM FETAL CARDIAC ACTIVITY USING ULTRASOUND: ICD-10-CM

## 2018-07-27 LAB — BETA HCG QUAL IFA URINE: POSITIVE

## 2018-07-27 PROCEDURE — 99213 OFFICE O/P EST LOW 20 MIN: CPT | Performed by: NURSE PRACTITIONER

## 2018-07-27 PROCEDURE — 84703 CHORIONIC GONADOTROPIN ASSAY: CPT | Performed by: NURSE PRACTITIONER

## 2018-07-27 ASSESSMENT — PAIN SCALES - GENERAL: PAINLEVEL: NO PAIN (0)

## 2018-07-27 NOTE — MR AVS SNAPSHOT
After Visit Summary   7/27/2018    Mavis Krishnamurthy    MRN: [id number]           Patient Information     Date Of Birth          1985        Visit Information        Provider Department      7/27/2018 12:10 PM Edilia Nicole APRN CNP [facility]        Today's Diagnoses     Absence of menstruation    -  1    Confirm fetal cardiac activity using ultrasound           Follow-ups after your visit        Future tests that were ordered for you today     Open Future Orders        Priority Expected Expires Ordered    US OB < 14 Weeks Single Routine  9/10/2018 7/27/2018            Who to contact     If you have questions or need follow up information about today's clinic visit or your schedule please contact [facility] directly at [phone].  Normal or non-critical lab and imaging results will be communicated to you by iWantoohart, letter or phone within 4 business days after the clinic has received the results. If you do not hear from us within 7 days, please contact the clinic through iWantoohart or phone. If you have a critical or abnormal lab result, we will notify you by phone as soon as possible.  Submit refill requests through Scalado or call your pharmacy and they will forward the refill request to us. Please allow 3 business days for your refill to be completed.          Additional Information About Your Visit        MyChart Information     Scalado gives you secure access to your electronic health record. If you see a primary care provider, you can also send messages to your care team and make appointments. If you have questions, please call your primary care clinic.  If you do not have a primary care provider, please call [phone] and they will assist you.        Care EveryWhere ID     This is your Care EveryWhere ID. This could be used by other organizations to access your [place] medical records  [id number]        Your Vitals Were     Pulse Temperature Height  "Last Period Pulse Oximetry BMI (Body Mass Index)    82 98.6  F (37  C) (Oral) 5' 5\" (1.651 m) 06/08/2018 (Exact Date) 100% 19.7 kg/m2       Blood Pressure from Last 3 Encounters:   07/27/18 106/73   06/21/18 96/66   06/18/18 99/59    Weight from Last 3 Encounters:   07/27/18 118 lb 6.4 oz (53.7 kg)   06/21/18 126 lb (57.2 kg)   06/18/18 119 lb (54 kg)              We Performed the Following     Beta HCG qual IFA urine - [facility] and [place]        Primary Care Provider Office Phone # Fax #    Leo Ceja [phone] [address] [facility] [address]        Equal Access to Services     [facility] : Hadii cassia lubin hadasho Soomaali, waaxda luqadaha, qaybta kaalmada ademerleneyasharmila, natasha torres . So [facility] [phone].    ATENCIÓN: Si habla español, tiene a restrepo disposición servicios gratuitos de asistencia lingüística. [facility] [phone].    We comply with applicable federal civil rights laws and [place] laws. We do not discriminate on the basis of race, color, national origin, age, disability, sex, sexual orientation, or gender identity.            Thank you!     Thank you for choosing [facility]  for your care. Our goal is always to provide you with excellent care. Hearing back from our patients is one way we can continue to improve our services. Please take a few minutes to complete the written survey that you may receive in the mail after your visit with us. Thank you!             Your Updated Medication List - Protect others around you: Learn how to safely use, store and throw away your medicines at www.disposemymeds.org.          This list is accurate as of 7/27/18  1:16 PM.  Always use your most recent med list.                   Brand Name Dispense Instructions for use Diagnosis    PRENATAL VITAMIN PO      Take 1 tablet by mouth daily          "

## 2018-07-27 NOTE — NURSING NOTE
"Chief Complaint   Patient presents with     Confirmation Of Pregnancy       Initial /73  Pulse 82  Temp 98.6  F (37  C) (Oral)  Ht 5' 5\" (1.651 m)  Wt 118 lb 6.4 oz (53.7 kg)  LMP 06/08/2018 (Exact Date)  SpO2 100%  BMI 19.7 kg/m2 Estimated body mass index is 19.7 kg/(m^2) as calculated from the following:    Height as of this encounter: 5' 5\" (1.651 m).    Weight as of this encounter: 118 lb 6.4 oz (53.7 kg)..  BP completed using cuff size: thang Sellers CMA    "

## 2018-07-27 NOTE — PROGRESS NOTES
S: Pt is a 33 year old,  2 para 2 who presents today with absence of menses. LMP was 18.  Nothing for contraception. Surprise pregnancy. Was wanting a third child eventually, just happened sooner than expected.  Complaints: decreased appetite, not really any nausea.   Previous Pap smear .  Pertinent Past Obstetric and Gynecological Medical History:  x 2-cholestasis with last pregnancy-induced at 37 weeks.    Patient has a history of bicuspid aortic valve. Usually sees Cardiology annually and in pregnancy had ECHO Q trimester. Already had her annual appointment scheduled for August prior to learning she was pregnant, will follow up with them next month.    Patient past medical, surgical, family, and social history reviewed.    O: General appearance: Well appearing female in no acute distress. Answers questions and maintains eye contact appropriately.  RESPIRATORY: Clear to auscultation bilaterally.  CV: Regular rate and rhythm without murmur, gallop, rub  ABDOMEN: Soft, nontender, nondistended, normoactive bowel sounds. No hepatosplenomegaly. No guarding, rebounding, or rigidity.  UPT Positive    A: Missed Menses  Weeks gestation: 7 weeks  JERMAIN: 3/15/19    P: 1) Prenatal vitamins - tolerating  2) Diet, exercise, work, and environmental hazards reviewed. Discussed delivery hospital, providers, prenatal visit schedule. Early ultrasound ordered to confirm viability. Toxoplasmosis precautions NA. Discussed avoidance of tobacco, ETOH, and street drugs. Signs and symptoms to monitor for and report discussed. Will schedule first OB visit at 10-12 weeks gestation.   Cardiology appointment already scheduled 8/15/18.  Discussed the cholestasis in last pregnancy, patient had 1 dose of Ursodiol and did not tolerate well-was then induced due to non-reassuring NST. We discussed her questions and concerns for this pregnancy.     Edilia CAVANAUGH CNP
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
## 2018-08-08 ENCOUNTER — OFFICE VISIT (OUTPATIENT)
Dept: OBGYN | Facility: CLINIC | Age: 33
End: 2018-08-08
Payer: COMMERCIAL

## 2018-08-08 VITALS
BODY MASS INDEX: 20.16 KG/M2 | TEMPERATURE: 98.2 F | DIASTOLIC BLOOD PRESSURE: 55 MMHG | HEART RATE: 74 BPM | WEIGHT: 121 LBS | HEIGHT: 65 IN | OXYGEN SATURATION: 100 % | SYSTOLIC BLOOD PRESSURE: 112 MMHG

## 2018-08-08 DIAGNOSIS — N90.7 INCLUSION CYST OF VULVA: Primary | ICD-10-CM

## 2018-08-08 PROCEDURE — 99213 OFFICE O/P EST LOW 20 MIN: CPT | Performed by: NURSE PRACTITIONER

## 2018-08-08 ASSESSMENT — PAIN SCALES - GENERAL: PAINLEVEL: MODERATE PAIN (4)

## 2018-08-08 NOTE — NURSING NOTE
"Chief Complaint   Patient presents with     Vaginal Problem       Initial /55  Pulse 74  Temp 98.2  F (36.8  C) (Oral)  Ht 5' 5\" (1.651 m)  Wt 121 lb (54.9 kg)  LMP 06/08/2018 (Exact Date)  SpO2 100%  BMI 20.14 kg/m2 Estimated body mass index is 20.14 kg/(m^2) as calculated from the following:    Height as of this encounter: 5' 5\" (1.651 m).    Weight as of this encounter: 121 lb (54.9 kg)..  BP completed using cuff size: thang Sellers CMA    "

## 2018-08-08 NOTE — MR AVS SNAPSHOT
After Visit Summary   8/8/2018    Mavis Krishnamurthy    MRN: [id number]           Patient Information     Date Of Birth          1985        Visit Information        Provider Department      8/8/2018 7:30 AM Edilia Nicole APRN CNP [facility]        Today's Diagnoses     Inclusion cyst of vulva    -  1       Follow-ups after your visit        Your next 10 appointments already scheduled     Aug 16, 2018  3:50 PM CDT   US OB < 14 WEEKS SINGLE with ANDUS1   [facility]    [address] [address] [phone]           Please bring a list of your medicines (including vitamins, minerals and over-the-counter drugs). Also, tell your doctor about any allergies you may have. Wear comfortable clothes and leave your valuables at home.  Drink four 8-ounce glasses of fluid an hour before your exam. If you need to empty your bladder before your exam, try to release only a little urine. Then, drink another glass of fluid.  You may have up to two family members in the exam room. If you bring a small child, an adult must be there to care for him or her. No video or camera photography during the procedure.  Please call the Imaging Department at your exam site with any questions.            Aug 20, 2018  7:50 AM CDT   New Prenatal with AFSHAN Conteh CNP   [facility]    [address] [facility] [address] [phone]              Who to contact     If you have questions or need follow up information about today's clinic visit or your schedule please contact [facility] directly at [phone].  Normal or non-critical lab and imaging results will be communicated to you by MyChart, letter or phone within 4 business days after the clinic has received the results. If you do not hear from us within 7 days, please contact the clinic through MyChart or phone. If you  "have a critical or abnormal lab result, we will notify you by phone as soon as possible.  Submit refill requests through Cheggin or call your pharmacy and they will forward the refill request to us. Please allow 3 business days for your refill to be completed.          Additional Information About Your Visit        Ubiquity Broadcasting Corporationhart Information     Cheggin gives you secure access to your electronic health record. If you see a primary care provider, you can also send messages to your care team and make appointments. If you have questions, please call your primary care clinic.  If you do not have a primary care provider, please call [phone] and they will assist you.        Care EveryWhere ID     This is your Care EveryWhere ID. This could be used by other organizations to access your [place] medical records  [id number]        Your Vitals Were     Pulse Temperature Height Last Period Pulse Oximetry BMI (Body Mass Index)    74 98.2  F (36.8  C) (Oral) 5' 5\" (1.651 m) 06/08/2018 (Exact Date) 100% 20.14 kg/m2       Blood Pressure from Last 3 Encounters:   08/08/18 112/55   07/27/18 106/73   06/21/18 96/66    Weight from Last 3 Encounters:   08/08/18 121 lb (54.9 kg)   07/27/18 118 lb 6.4 oz (53.7 kg)   06/21/18 126 lb (57.2 kg)              Today, you had the following     No orders found for display       Primary Care Provider Office Phone # Fax #    Leo Ceja [phone] [address] [facility] [address]        Equal Access to Services     [facility] : Hadii cassia lubin hadasho Soerica, waaxda luqadaha, qaybta kaalmada natasha zuñiga . So [facility] [phone].    ATENCIÓN: Si habla español, tiene a restrepo disposición servicios gratuitos de asistencia lingüística. Llame al [phone].    We comply with applicable federal civil rights laws and [place] laws. We do not discriminate on the basis of race, color, national origin, age, disability, sex, sexual " orientation, or gender identity.            Thank you!     Thank you for [facility]  [facility]. Our goal is always to provide you with excellent care. Hearing back from our patients is one way we can continue to improve our services. Please take a few minutes to complete the written survey that you may receive in the mail after your visit with us. Thank you!             Your Updated Medication List - Protect others around you: Learn how to safely use, store and throw away your medicines at www.disposemymeds.org.          This list is accurate as of 8/8/18 10:06 AM.  Always use your most recent med list.                   Brand Name Dispense Instructions for use Diagnosis    PRENATAL VITAMIN PO      Take 1 tablet by mouth daily

## 2018-08-15 ENCOUNTER — TRANSFERRED RECORDS (OUTPATIENT)
Dept: HEALTH INFORMATION MANAGEMENT | Facility: CLINIC | Age: 33
End: 2018-08-15

## 2018-08-16 ENCOUNTER — RADIANT APPOINTMENT (OUTPATIENT)
Dept: ULTRASOUND IMAGING | Facility: CLINIC | Age: 33
End: 2018-08-16
Attending: NURSE PRACTITIONER
Payer: COMMERCIAL

## 2018-08-16 DIAGNOSIS — Z36.89 CONFIRM FETAL CARDIAC ACTIVITY USING ULTRASOUND: ICD-10-CM

## 2018-08-16 PROCEDURE — 76801 OB US < 14 WKS SINGLE FETUS: CPT

## 2018-08-20 ENCOUNTER — PRENATAL OFFICE VISIT (OUTPATIENT)
Dept: OBGYN | Facility: CLINIC | Age: 33
End: 2018-08-20
Payer: COMMERCIAL

## 2018-08-20 VITALS
HEART RATE: 77 BPM | SYSTOLIC BLOOD PRESSURE: 97 MMHG | OXYGEN SATURATION: 100 % | DIASTOLIC BLOOD PRESSURE: 68 MMHG | WEIGHT: 122.6 LBS | BODY MASS INDEX: 20.43 KG/M2 | TEMPERATURE: 98.5 F | HEIGHT: 65 IN

## 2018-08-20 DIAGNOSIS — Z34.80 SUPERVISION OF OTHER NORMAL PREGNANCY, ANTEPARTUM: Primary | ICD-10-CM

## 2018-08-20 LAB
ABO + RH BLD: NORMAL
ABO + RH BLD: NORMAL
BASOPHILS # BLD AUTO: 0 10E9/L (ref 0–0.2)
BASOPHILS NFR BLD AUTO: 0.4 %
BLD GP AB SCN SERPL QL: NORMAL
BLOOD BANK CMNT PATIENT-IMP: NORMAL
DIFFERENTIAL METHOD BLD: NORMAL
EOSINOPHIL # BLD AUTO: 0.3 10E9/L (ref 0–0.7)
EOSINOPHIL NFR BLD AUTO: 3 %
ERYTHROCYTE [DISTWIDTH] IN BLOOD BY AUTOMATED COUNT: 12.8 % (ref 10–15)
HBV SURFACE AG SERPL QL IA: NONREACTIVE
HCT VFR BLD AUTO: 36.2 % (ref 35–47)
HGB BLD-MCNC: 12.6 G/DL (ref 11.7–15.7)
HIV 1+2 AB+HIV1 P24 AG SERPL QL IA: NONREACTIVE
LYMPHOCYTES # BLD AUTO: 1.9 10E9/L (ref 0.8–5.3)
LYMPHOCYTES NFR BLD AUTO: 22.2 %
MCH RBC QN AUTO: 32.1 PG (ref 26.5–33)
MCHC RBC AUTO-ENTMCNC: 34.8 G/DL (ref 31.5–36.5)
MCV RBC AUTO: 92 FL (ref 78–100)
MONOCYTES # BLD AUTO: 0.6 10E9/L (ref 0–1.3)
MONOCYTES NFR BLD AUTO: 6.7 %
NEUTROPHILS # BLD AUTO: 5.7 10E9/L (ref 1.6–8.3)
NEUTROPHILS NFR BLD AUTO: 67.7 %
PLATELET # BLD AUTO: 209 10E9/L (ref 150–450)
RBC # BLD AUTO: 3.92 10E12/L (ref 3.8–5.2)
RUBV IGG SERPL IA-ACNC: 27 IU/ML
SPECIMEN EXP DATE BLD: NORMAL
T PALLIDUM AB SER QL: NONREACTIVE
WBC # BLD AUTO: 8.4 10E9/L (ref 4–11)

## 2018-08-20 PROCEDURE — 86780 TREPONEMA PALLIDUM: CPT | Performed by: NURSE PRACTITIONER

## 2018-08-20 PROCEDURE — 87340 HEPATITIS B SURFACE AG IA: CPT | Performed by: NURSE PRACTITIONER

## 2018-08-20 PROCEDURE — 86850 RBC ANTIBODY SCREEN: CPT | Performed by: NURSE PRACTITIONER

## 2018-08-20 PROCEDURE — 86901 BLOOD TYPING SEROLOGIC RH(D): CPT | Performed by: NURSE PRACTITIONER

## 2018-08-20 PROCEDURE — 87591 N.GONORRHOEAE DNA AMP PROB: CPT | Performed by: NURSE PRACTITIONER

## 2018-08-20 PROCEDURE — 87086 URINE CULTURE/COLONY COUNT: CPT | Performed by: NURSE PRACTITIONER

## 2018-08-20 PROCEDURE — 87624 HPV HI-RISK TYP POOLED RSLT: CPT | Performed by: NURSE PRACTITIONER

## 2018-08-20 PROCEDURE — 99207 ZZC FIRST OB VISIT: CPT | Performed by: NURSE PRACTITIONER

## 2018-08-20 PROCEDURE — G0145 SCR C/V CYTO,THINLAYER,RESCR: HCPCS | Performed by: NURSE PRACTITIONER

## 2018-08-20 PROCEDURE — 87389 HIV-1 AG W/HIV-1&-2 AB AG IA: CPT | Performed by: NURSE PRACTITIONER

## 2018-08-20 PROCEDURE — 86762 RUBELLA ANTIBODY: CPT | Performed by: NURSE PRACTITIONER

## 2018-08-20 PROCEDURE — 87491 CHLMYD TRACH DNA AMP PROBE: CPT | Performed by: NURSE PRACTITIONER

## 2018-08-20 PROCEDURE — 85025 COMPLETE CBC W/AUTO DIFF WBC: CPT | Performed by: NURSE PRACTITIONER

## 2018-08-20 PROCEDURE — 36415 COLL VENOUS BLD VENIPUNCTURE: CPT | Performed by: NURSE PRACTITIONER

## 2018-08-20 PROCEDURE — 86900 BLOOD TYPING SEROLOGIC ABO: CPT | Performed by: NURSE PRACTITIONER

## 2018-08-20 ASSESSMENT — PAIN SCALES - GENERAL: PAINLEVEL: NO PAIN (0)

## 2018-08-20 NOTE — MR AVS SNAPSHOT
"              After Visit Summary   8/20/2018    Mavis Krishnamurthy    MRN: [id number]           Patient Information     Date Of Birth          1985        Visit Information        Provider Department      8/20/2018 7:50 AM Edilia Nicole APRN CNP [facility]        Today's Diagnoses     Supervision of other normal pregnancy, antepartum    -  1       Follow-ups after your visit        Who to contact     If you have questions or need follow up information about today's clinic visit or your schedule please contact [facility] directly at [phone].  Normal or non-critical lab and imaging results will be communicated to you by eBooxhart, letter or phone within 4 business days after the clinic has received the results. If you do not hear from us within 7 days, please contact the clinic through eBooxhart or phone. If you have a critical or abnormal lab result, we will notify you by phone as soon as possible.  Submit refill requests through Neurovance or call your pharmacy and they will forward the refill request to us. Please allow 3 business days for your refill to be completed.          Additional Information About Your Visit        MyChart Information     Neurovance gives you secure access to your electronic health record. If you see a primary care provider, you can also send messages to your care team and make appointments. If you have questions, please call your primary care clinic.  If you do not have a primary care provider, please call [phone] and they will assist you.        Care EveryWhere ID     This is your Care EveryWhere ID. This could be used by other organizations to access your [place] medical records  [id number]        Your Vitals Were     Pulse Temperature Height Last Period Pulse Oximetry BMI (Body Mass Index)    77 98.5  F (36.9  C) (Oral) 5' 5\" (1.651 m) 06/08/2018 (Exact Date) 100% 20.4 kg/m2       Blood Pressure from Last 3 Encounters:   08/20/18 97/68   08/08/18 " 112/55   07/27/18 106/73    Weight from Last 3 Encounters:   08/20/18 122 lb 9.6 oz (55.6 kg)   08/08/18 121 lb (54.9 kg)   07/27/18 118 lb 6.4 oz (53.7 kg)              We Performed the Following     ABO/Rh type and screen     CBC with platelets differential     Chlamydia trachomatis PCR     Hepatitis B surface antigen     HIV Antigen Antibody Combo     HPV High Risk Types DNA Cervical     Neisseria gonorrhoeae PCR     Pap imaged thin layer screen with HPV - recommended age 30 - 65 years (select HPV order below)     Rubella Antibody IgG Quantitative     Treponema Abs w Reflex to RPR and Titer     Urine Culture Aerobic Bacterial        Primary Care Provider Office Phone # Fax #    Leo Tariq Ceja [phone] [address] [facility] [address]        Equal Access to Services     CHRIS RAO : Hadii cassia lubin hadasho Soomaali, waaxda luqadaha, qaybta kaalmada adeegyada, waxhailey torres . So [facility] [phone].    ATENCIÓN: Si habla español, tiene a restrepo disposición servicios gratuitos de asistencia lingüística. Rajani al [phone].    We comply with applicable federal civil rights laws and [place] laws. We do not discriminate on the basis of race, color, national origin, age, disability, sex, sexual orientation, or gender identity.            Thank you!     Thank you for choosing [facility]  for your care. Our goal is always to provide you with excellent care. Hearing back from our patients is one way we can continue to improve our services. Please take a few minutes to complete the written survey that you may receive in the mail after your visit with us. Thank you!             Your Updated Medication List - Protect others around you: Learn how to safely use, store and throw away your medicines at www.disposemymeds.org.          This list is accurate as of 8/20/18  8:12 AM.  Always use your most recent med list.                   Brand Name Dispense  Instructions for use Diagnosis    PRENATAL VITAMIN PO      Take 1 tablet by mouth daily        PROBIOTIC DAILY PO

## 2018-08-20 NOTE — NURSING NOTE
"Chief Complaint   Patient presents with     Prenatal Care     FOB       Initial BP 97/68  Pulse 77  Temp 98.5  F (36.9  C) (Oral)  Ht 5' 5\" (1.651 m)  Wt 122 lb 9.6 oz (55.6 kg)  LMP 06/08/2018 (Exact Date)  SpO2 100%  BMI 20.4 kg/m2 Estimated body mass index is 20.4 kg/(m^2) as calculated from the following:    Height as of this encounter: 5' 5\" (1.651 m).    Weight as of this encounter: 122 lb 9.6 oz (55.6 kg)..  BP completed using cuff size: thang Sellers CMA    "
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
## 2018-08-21 LAB
BACTERIA SPEC CULT: NO GROWTH
C TRACH DNA SPEC QL NAA+PROBE: NEGATIVE
N GONORRHOEA DNA SPEC QL NAA+PROBE: NEGATIVE
SPECIMEN SOURCE: NORMAL

## 2018-08-22 LAB
COPATH REPORT: NORMAL
PAP: NORMAL

## 2018-08-23 LAB
FINAL DIAGNOSIS: NORMAL
HPV HR 12 DNA CVX QL NAA+PROBE: NEGATIVE
HPV16 DNA SPEC QL NAA+PROBE: NEGATIVE
HPV18 DNA SPEC QL NAA+PROBE: NEGATIVE
SPECIMEN DESCRIPTION: NORMAL
SPECIMEN SOURCE CVX/VAG CYTO: NORMAL

## 2018-08-31 ENCOUNTER — MYC MEDICAL ADVICE (OUTPATIENT)
Dept: OBGYN | Facility: CLINIC | Age: 33
End: 2018-08-31

## 2018-08-31 ENCOUNTER — PRENATAL OFFICE VISIT (OUTPATIENT)
Dept: OBGYN | Facility: CLINIC | Age: 33
End: 2018-08-31
Payer: COMMERCIAL

## 2018-08-31 VITALS
BODY MASS INDEX: 20.3 KG/M2 | OXYGEN SATURATION: 100 % | TEMPERATURE: 98.9 F | DIASTOLIC BLOOD PRESSURE: 70 MMHG | WEIGHT: 122 LBS | SYSTOLIC BLOOD PRESSURE: 102 MMHG | HEART RATE: 76 BPM

## 2018-08-31 DIAGNOSIS — R10.9 ABDOMINAL PAIN AFFECTING PREGNANCY: Primary | ICD-10-CM

## 2018-08-31 DIAGNOSIS — O26.899 ABDOMINAL PAIN AFFECTING PREGNANCY: Primary | ICD-10-CM

## 2018-08-31 LAB
ALBUMIN UR-MCNC: NEGATIVE MG/DL
APPEARANCE UR: CLEAR
BILIRUB UR QL STRIP: NEGATIVE
COLOR UR AUTO: YELLOW
GLUCOSE UR STRIP-MCNC: NEGATIVE MG/DL
HGB UR QL STRIP: NEGATIVE
KETONES UR STRIP-MCNC: NEGATIVE MG/DL
LEUKOCYTE ESTERASE UR QL STRIP: NEGATIVE
NITRATE UR QL: NEGATIVE
NON-SQ EPI CELLS #/AREA URNS LPF: NORMAL /LPF
PH UR STRIP: 6.5 PH (ref 5–7)
RBC #/AREA URNS AUTO: NORMAL /HPF
SOURCE: NORMAL
SP GR UR STRIP: 1.01 (ref 1–1.03)
SPECIMEN SOURCE: NORMAL
UROBILINOGEN UR STRIP-ACNC: 0.2 EU/DL (ref 0.2–1)
WBC #/AREA URNS AUTO: NORMAL /HPF
WET PREP SPEC: NORMAL

## 2018-08-31 PROCEDURE — 81001 URINALYSIS AUTO W/SCOPE: CPT | Performed by: NURSE PRACTITIONER

## 2018-08-31 PROCEDURE — 87086 URINE CULTURE/COLONY COUNT: CPT | Performed by: NURSE PRACTITIONER

## 2018-08-31 PROCEDURE — 99207 ZZC PRENATAL VISIT: CPT | Performed by: NURSE PRACTITIONER

## 2018-08-31 PROCEDURE — 87210 SMEAR WET MOUNT SALINE/INK: CPT | Performed by: NURSE PRACTITIONER

## 2018-08-31 NOTE — PROGRESS NOTES
SUBJECTIVE:   Mavis Krishnamurthy is a 33 year old female who presents to clinic today for the following health issues:    Abdominal discomfort    Patient is 12 weeks gestation and sent a message today due to abdominal discomfort/cramping that began this morning. Coming in waves about 2-5 times an hour. Feels like a tight menstrual cramp. Denies vaginal bleeding, leaking of fluid. No recent heavy lifting, intercourse. Occasional low back discomfort. Denies sharp, shooting or stabbing pains. Generalized across lower abdomen. Having frequency, but drinking a lot of water regularly. Denies dysuria, hematuria, urgency, abnormal vaginal symptoms.  Denies nausea, fevers. Last BM was maybe 6-7 days ago. Has been much more constipated this pregnancy, not taking anything for constipation.     Problem list and histories reviewed & adjusted, as indicated.  Additional history: as documented    Patient Active Problem List   Diagnosis     CARDIOVASCULAR SCREENING; LDL GOAL LESS THAN 160     Allergic rhinitis     IBS (irritable bowel syndrome)     Chronic maxillary sinusitis     Lactose intolerance     Migraine     Abnormal antinuclear antibody titer     Bicuspid aortic valve     Supervision of other normal pregnancy, antepartum     Past Surgical History:   Procedure Laterality Date     COLONOSCOPY WITH CO2 INSUFFLATION N/A 6/18/2015    Procedure: COLONOSCOPY WITH CO2 INSUFFLATION;  Surgeon: Angel Thomas MD;  Location:  OR     ENDOSCOPIC BALLOON SINUPLASTY, OPTICAL TRACKING SYSTEM ENDOSCOPIC SINUS SURGERY, COMBINED  11/7/2013    Procedure: COMBINED ENDOSCOPIC BALLOON SINUPLASTY, OPTICAL TRACKING SYSTEM ENDOSCOPIC SINUS SURGERY;  Bilateral Endoscopic Ethmoidectomy, Maxillary Antrostomy, Frontal Sinusototmy with Navigation and Balloon and Propel Implants;  Surgeon: Vasquez Corona MD;  Location:  OR     ENDOSCOPY  10/2017    Of throat, [place] part off, [place] [facility]     EXAM OF VAGINA,COLPOSCOPY      X -2     MOUTH  SURGERY  2009    [place] Teeth      PHOTOREFRACTIVE KERATECTOMY      Oct and Dec 2015     UPPER GI ENDOSCOPY         Social History   Substance Use Topics     Smoking status: Never Smoker     Smokeless tobacco: Never Used     Alcohol use No     Family History   Problem Relation Age of Onset     Hypertension Mother      HEART DISEASE Father      open heart for endocarditis     Neurologic Disorder Father 16     migraines     Alzheimer Disease Maternal Grandmother 80     Neurologic Disorder Paternal Grandmother      migraines unknown age     Neurologic Disorder Sister 20     migraines           Reviewed and updated as needed this visit by clinical staff       Reviewed and updated as needed this visit by Provider         ROS:  Constitutional, HEENT, cardiovascular, pulmonary, gi and gu systems are negative, except as otherwise noted.    OBJECTIVE:     /70  Pulse 76  Temp 98.9  F (37.2  C) (Oral)  Wt 122 lb (55.3 kg)  LMP 06/08/2018 (Exact Date)  SpO2 100%  BMI 20.3 kg/m2  Body mass index is 20.3 kg/(m^2).  GENERAL: healthy, alert and no distress  RESP: lungs clear to auscultation - no rales, rhonchi or wheezes  CV: regular rate and rhythm, normal S1 S2, no S3 or S4, no murmur, click or rub, no peripheral edema and peripheral pulses strong  ABDOMEN: soft, nontender, no hepatosplenomegaly, no masses and bowel sounds normal  Vulva: No external lesions, normal hair distribution, no adenopathy  BUS:  Normal, no masses noted  Vagina: Moist, pink, no abnormal discharge, well rugated, no lesions  Cervix: Pink, parous, midline. Without cervical motion tenderness.  Uterus: approximately 12 weeks gestation, non-tender, mobile  Ovaries: No masses, non-tender, mobile  MS: no gross musculoskeletal defects noted, no edema  SKIN: no suspicious lesions or rashes  PSYCH: mentation appears normal, affect normal/bright    Diagnostic Test Results:  Results for orders placed or performed in visit on 08/31/18 (from the past 24  hour(s))   UA with Microscopic   Result Value Ref Range    Color Urine Yellow     Appearance Urine Clear     Glucose Urine Negative NEG^Negative mg/dL    Bilirubin Urine Negative NEG^Negative    Ketones Urine Negative NEG^Negative mg/dL    Specific Gravity Urine 1.010 1.003 - 1.035    pH Urine 6.5 5.0 - 7.0 pH    Protein Albumin Urine Negative NEG^Negative mg/dL    Urobilinogen Urine 0.2 0.2 - 1.0 EU/dL    Nitrite Urine Negative NEG^Negative    Blood Urine Negative NEG^Negative    Leukocyte Esterase Urine Negative NEG^Negative    Source Midstream Urine     WBC Urine 0 - 5 OTO5^0 - 5 /HPF    RBC Urine O - 2 OTO2^O - 2 /HPF    Squamous Epithelial /LPF Urine Few FEW^Few /LPF   Wet prep   Result Value Ref Range    Specimen Description Vagina     Wet Prep No Trichomonas seen     Wet Prep No clue cells seen     Wet Prep No yeast seen        ASSESSMENT/PLAN:   1. Abdominal pain affecting pregnancy  Discussed her symptoms and possible etiologies. Labs normal. Discussed constipation and relief measures to try over the weekend. Red flag warning signs to monitor for and report immediately discussed with patient and she verbalizes understanding. Reviewed when she would need to go to ED, or how to reach on call MD over weekend if symptoms worsen. Discussed Tylenol, heat, massage as well.  - Wet prep  - Urine Culture Aerobic Bacterial  - UA with Microscopic    AFSHAN Conteh [facility]

## 2018-08-31 NOTE — TELEPHONE ENCOUNTER
Noted patient was seen by AFSHAN Meza, CNP on 08-20-18 and future appt is on 09-17-18.   Yvonne Roman RN, BAN

## 2018-08-31 NOTE — TELEPHONE ENCOUNTER
Noted no same day appts available in [facility] today.  Will route to AFSHAN Meza, CNP for review & orders. Yvonne Roman RN, BAN

## 2018-08-31 NOTE — MR AVS SNAPSHOT
After Visit Summary   8/31/2018    Mavis Krishnamurthy    MRN: [id number]           Patient Information     Date Of Birth          1985        Visit Information        Provider Department      8/31/2018 1:30 PM Edilia Nicole APRN CNP [facility]        Today's Diagnoses     Abdominal pain affecting pregnancy    -  1       Follow-ups after your visit        Your next 10 appointments already scheduled     Sep 17, 2018  7:30 AM CDT   [facility]    [address] [address] [phone]            Oct 15, 2018  7:30 AM CDT   [facility]   [facility]    [address] [address] [phone]              Who to contact     If you have questions or need follow up information about today's clinic visit or your schedule please contact [facility] directly at [phone].  Normal or non-critical lab and imaging results will be communicated to you by MyChart, letter or phone within 4 business days after the clinic has received the results. If you do not hear from us within 7 days, please contact the clinic through [employer] or phone. If you have a critical or abnormal lab result, we will notify you by phone as soon as possible.  Submit refill requests through [employer] or call your pharmacy and they will forward the refill request to us. Please allow 3 business days for your refill to be completed.          Additional Information About Your Visit        MyChart Information     [employer] gives you secure access to your electronic health record. If you see a primary care provider, you can also send messages to your care team and make appointments. If you have questions, please call your primary care clinic.  If you do not have a primary care  provider, please call [phone] and they will assist you.        Care EveryWhere ID     This is your Care EveryWhere ID. This could be used by other organizations to access your [place] medical records  [id number]        Your Vitals Were     Pulse Temperature Last Period Pulse Oximetry BMI (Body Mass Index)       76 98.9  F (37.2  C) (Oral) 06/08/2018 (Exact Date) 100% 20.3 kg/m2        Blood Pressure from Last 3 Encounters:   08/31/18 102/70   08/20/18 97/68   08/08/18 112/55    Weight from Last 3 Encounters:   08/31/18 122 lb (55.3 kg)   08/20/18 122 lb 9.6 oz (55.6 kg)   08/08/18 121 lb (54.9 kg)              We Performed the Following     UA with Microscopic     Urine Culture Aerobic Bacterial     Wet prep        Primary Care Provider Office Phone # Fax #    Leo Tariq Ceja [phone] [address] [address] [facility] [address]        Equal Access to Services     CHRIS RAO : Hadii aad ku hadasho Soomaali, waaxda luqadaha, qaybta kaalmada adeegyada, waxhailey torres . So [facility] [phone].    ATENCIÓN: Si habla español, tiene a restrepo disposición servicios gratuitos de asistencia lingüística. Llame al [phone].    We comply with applicable federal civil rights laws and [place] laws. We do not discriminate on the basis of race, color, national origin, age, disability, sex, sexual orientation, or gender identity.            Thank you!     Thank you for choosing [facility]  for your care. Our goal is always to provide you with excellent care. Hearing back from our patients is one way we can continue to improve our services. Please take a few minutes to complete the written survey that you may receive in the mail after your visit with us. Thank you!             Your Updated Medication List - Protect others around you: Learn how to safely use, store and throw away your medicines at www.disposemymeds.org.          This list is accurate as of 8/31/18   3:09 PM.  Always use your most recent med list.                   Brand Name Dispense Instructions for use Diagnosis    PRENATAL VITAMIN PO      Take 1 tablet by mouth daily        PROBIOTIC DAILY PO

## 2018-09-01 LAB
BACTERIA SPEC CULT: NORMAL
SPECIMEN SOURCE: NORMAL

## 2018-09-03 ENCOUNTER — MYC MEDICAL ADVICE (OUTPATIENT)
Dept: OBGYN | Facility: CLINIC | Age: 33
End: 2018-09-03

## 2018-09-04 NOTE — TELEPHONE ENCOUNTER
Noted patient was seen 08-31-18 by AFSHAN Meza CNP. Will route to AFSHAN Meza, CNP for advise on spotting related to wet prep. Yvonne Roman RN, BAN

## 2018-09-17 ENCOUNTER — PRENATAL OFFICE VISIT (OUTPATIENT)
Dept: OBGYN | Facility: CLINIC | Age: 33
End: 2018-09-17
Payer: COMMERCIAL

## 2018-09-17 VITALS
DIASTOLIC BLOOD PRESSURE: 55 MMHG | OXYGEN SATURATION: 100 % | TEMPERATURE: 97.9 F | HEART RATE: 74 BPM | WEIGHT: 126 LBS | HEIGHT: 65 IN | SYSTOLIC BLOOD PRESSURE: 84 MMHG | BODY MASS INDEX: 20.99 KG/M2

## 2018-09-17 DIAGNOSIS — Z34.80 SUPERVISION OF OTHER NORMAL PREGNANCY, ANTEPARTUM: Primary | ICD-10-CM

## 2018-09-17 PROCEDURE — 99207 ZZC PRENATAL VISIT: CPT | Performed by: NURSE PRACTITIONER

## 2018-09-17 ASSESSMENT — PAIN SCALES - GENERAL: PAINLEVEL: NO PAIN (0)

## 2018-09-17 NOTE — MR AVS SNAPSHOT
After Visit Summary   9/17/2018    Mavis Krishnamurthy    MRN: [id number]           Patient Information     Date Of Birth          1985        Visit Information        Provider Department      9/17/2018 7:30 AM Edilia Nicole APRN CNP [facility]        Today's Diagnoses     Supervision of other normal pregnancy, antepartum    -  1       Follow-ups after your visit        Your next 10 appointments already scheduled     Oct 15, 2018  7:30 AM CDT   [facility]   [facility]    [address] [address] [phone]              Future tests that were ordered for you today     Open Future Orders        Priority Expected Expires Ordered    US OB > 14 Weeks Complete Single Routine  12/16/2018 9/17/2018            Who to contact     If you have questions or need follow up information about today's clinic visit or your schedule please contact [facility] directly at [phone].  Normal or non-critical lab and imaging results will be communicated to you by [employer], letter or phone within 4 business days after the clinic has received the results. If you do not hear from us within 7 days, please contact the clinic through [employer] or phone. If you have a critical or abnormal lab result, we will notify you by phone as soon as possible.  Submit refill requests through [employer] or call your pharmacy and they will forward the refill request to us. Please allow 3 business days for your refill to be completed.          Additional Information About Your Visit        [employer] Information     [employer] gives you secure access to your electronic health record. If you see a primary care provider, you can also send messages to your care team and make appointments. If you have questions, please call your primary care clinic.  If you do not have a primary care provider, please call  "[phone] and they will assist you.        Care EveryWhere ID     This is your Care EveryWhere ID. This could be used by other organizations to access your [place] medical records  [id number]        Your Vitals Were     Pulse Temperature Height Last Period Pulse Oximetry BMI (Body Mass Index)    74 97.9  F (36.6  C) (Oral) 5' 5\" (1.651 m) 06/08/2018 (Exact Date) 100% 20.97 kg/m2       Blood Pressure from Last 3 Encounters:   09/17/18 (!) 84/55   08/31/18 102/70   08/20/18 97/68    Weight from Last 3 Encounters:   09/17/18 126 lb (57.2 kg)   08/31/18 122 lb (55.3 kg)   08/20/18 122 lb 9.6 oz (55.6 kg)               Primary Care Provider Office Phone # Fax #    Leo Ceja [phone] [address] [address] [facility] [address]        Equal Access to Services     [facility] Center[facility]Y : Hadii cassia ku hadasho Soomaali, waaxda luqadaha, qaybta kaalmada adeegyada, natasha torres . So [facility] [phone].    ATENCIÓN: Si habla español, tiene a restrepo disposición servicios gratuitos de asistencia lingüística. Llame al [phone].    We comply with applicable federal civil rights laws and [place] laws. We do not discriminate on the basis of race, color, national origin, age, disability, sex, sexual orientation, or gender identity.            Thank you!     Thank you for choosing [facility]  for your care. Our goal is always to provide you with excellent care. Hearing back from our patients is one way we can continue to improve our services. Please take a few minutes to complete the written survey that you may receive in the mail after your visit with us. Thank you!             Your Updated Medication List - Protect others around you: Learn how to safely use, store and throw away your medicines at www.disposemymeds.org.          This list is accurate as of 9/17/18  7:45 AM.  Always use your most recent med list.                   Brand Name Dispense Instructions for " use Diagnosis    PRENATAL VITAMIN PO      Take 1 tablet by mouth daily        PROBIOTIC DAILY PO

## 2018-09-17 NOTE — PROGRESS NOTES
Patient presents for routine prenatal visit. Prenatal flowsheet reviewed and updated as needed.  Denies vaginal bleeding, loss of fluid, contractions or cramping.  Patient without complaint. Blood pressure is low today. She denies any abnormal symptoms. Spoke about it with her Cardiologist a few weeks ago and they advised increasing salt intake in her diet. Blood pressure today is lower than previous visits-she will contact her Cardiologist today for any additional guidance. To notify me of their recommendations. We discussed maintaining fluid intake. Warning signs to monitor for and report immediately discussed with patient and she verbalizes understanding.  Screening ultrasound ordered.  Advice as per Anticipatory Guidance/Checklist updated.  PE: See OB vitals    Questions asked and answered. Next OB visit in 4 week(s) with Dr. Sapp.    Edilia CAVANAUGH CNP

## 2018-09-17 NOTE — NURSING NOTE
"Chief Complaint   Patient presents with     Prenatal Care     14w3d       Initial BP (!) 84/55  Pulse 74  Temp 97.9  F (36.6  C) (Oral)  Ht 5' 5\" (1.651 m)  Wt 126 lb (57.2 kg)  LMP 06/08/2018 (Exact Date)  SpO2 100%  BMI 20.97 kg/m2 Estimated body mass index is 20.97 kg/(m^2) as calculated from the following:    Height as of this encounter: 5' 5\" (1.651 m).    Weight as of this encounter: 126 lb (57.2 kg)..  BP completed using cuff size: thang Sellers CMA    "

## 2018-09-24 ENCOUNTER — MYC MEDICAL ADVICE (OUTPATIENT)
Dept: OBGYN | Facility: CLINIC | Age: 33
End: 2018-09-24

## 2018-10-15 ENCOUNTER — PRENATAL OFFICE VISIT (OUTPATIENT)
Dept: OBGYN | Facility: CLINIC | Age: 33
End: 2018-10-15
Payer: COMMERCIAL

## 2018-10-15 VITALS
BODY MASS INDEX: 21.6 KG/M2 | SYSTOLIC BLOOD PRESSURE: 110 MMHG | OXYGEN SATURATION: 100 % | DIASTOLIC BLOOD PRESSURE: 67 MMHG | HEART RATE: 76 BPM | TEMPERATURE: 98.2 F | WEIGHT: 129.8 LBS

## 2018-10-15 DIAGNOSIS — Z34.80 SUPERVISION OF OTHER NORMAL PREGNANCY, ANTEPARTUM: Primary | ICD-10-CM

## 2018-10-15 DIAGNOSIS — Q23.81 BICUSPID AORTIC VALVE: ICD-10-CM

## 2018-10-15 PROCEDURE — 99207 ZZC PRENATAL VISIT: CPT | Performed by: OBSTETRICS & GYNECOLOGY

## 2018-10-15 NOTE — MR AVS SNAPSHOT
After Visit Summary   10/15/2018    Mavis Krishnamurthy    MRN: [id number]           Patient Information     Date Of Birth          1985        Visit Information        Provider Department      10/15/2018 7:30 AM Ezra Sapp MD [facility]        Today's Diagnoses     Supervision of other normal pregnancy, antepartum    -  1    Bicuspid aortic valve          Care Instructions                                                         If you have any questions regarding your visit, Please contact your care team.    [facility] Access Services: [phone]      Women s Health CLINIC HOURS TELEPHONE NUMBER   MD Page Santoyo [facility]    Juanis -    MATTIE Russell       Monday:       7:30-4:30 [place]  Wednesday:       7:30-4:30 [place]  Thursday:       7:30-1:30 [place]  Friday:       7:30-11:30 [facility]  [address] [facility]. [place], [place]  [id number] [phone] ask for [facility]  [address] [place] [address]  [phone] ask for [facility]    Imaging Scheduling for [place]:  [phone]    Imaging Scheduling for [place]: [phone]       Urgent Care locations:    [facility] Saturday and Sunday   9 am - 5 pm    Monday-Friday   12 pm - 8 pm  Saturday and Sunday   9 am - 5 pm   [phone]     [facility] Labor and Delivery:  [phone]    If you need a medication refill, please contact your pharmacy. Please allow 3 business days for your refill to be completed.  As always, Thank you for trusting us with your healthcare needs!              Follow-ups after your visit        Follow-up notes from your care team     Return in about 4 weeks (around 11/12/2018) for Prenatal Visit.      Your next 10 appointments already scheduled     Oct 29, 2018  4:50 PM CDT   US OB > 14 WEEKS COMPLETE SINGLE with ANDUS1   [facility]  [facility])    [address] [facility] [address]   [phone]           How do I prepare for my exam? (Food and drink instructions) Drink four 8-ounce glasses of fluid an hour before your exam. If you need to empty your bladder before your exam, try to release only a little urine. Then, drink another glass of fluid.  How do I prepare for my exam? (Other instructions) You may have up to two family members in the exam room. If you bring a small child, an adult must be there to care for him or her. No video or camera photography during the procedure.  What should I wear: Wear comfortable clothes.  How long does the exam take: Most ultrasounds take 30 to 60 minutes.  What should I bring: Bring a list of your medicines, including vitamins, minerals and over-the-counter drugs. It is safest to leave personal items at home.  Do I need a :  No  is needed.  What do I need to tell my doctor: Tell your doctor about any allergies you may have.  What should I do after the exam: No restrictions, You may resume normal activities.  What is this test: An ultrasound uses sound waves to make pictures of the body. Sound waves do not cause pain. The only discomfort may be the pressure of the wand against your skin or full bladder.  Who should I call with questions: If you have any questions, please call the Imaging Department where you will have your exam. Directions, parking instructions, and other information is available on our website, [email or web address].            Nov 12, 2018  7:30 AM CST   [facility] Establ[facility] AF[facility]    [address] [facility] [address]   [phone]            Dec 10, 2018  7:30 AM CST   [facility] Establ[facility] Prenatal with [facility] MD   [facility]    [address] [facility] [address]   [phone]               Who to contact     If you have questions or need follow up information about today's clinic visit or your schedule please contact [facility] directly at [phone].  Normal or non-critical lab and imaging results will be communicated to you by MyChart, letter or phone within 4 business days after the clinic has received the results. If you do not hear from us within 7 days, please contact the clinic through No Paper Just Vaporhart or phone. If you have a critical or abnormal lab result, we will notify you by phone as soon as possible.  Submit refill requests through Qual[employer] Signals or call your pharmacy and they will forward the refill request to us. Please allow 3 business days for your refill to be completed.          Additional Information About Your Visit        No Paper Just Vapor[employer]Fancy Hands Information     Qual[employer] Signals gives you secure access to your electronic health record. If you see a primary care provider, you can also send messages to your care team and make appointments. If you have questions, please call your primary care clinic.  If you do not have a primary care provider, please call [phone] and they will assist you.        Care EveryWhere ID     This is your Care EveryWhere ID. This could be used by other organizations to access your [place] medical records  [id number]        Your Vitals Were     Pulse Temperature Last Period Pulse Oximetry BMI (Body Mass Index)       76 98.2  F (36.8  C) (Oral) 06/08/2018 (Exact Date) 100% 21.6 kg/m2        Blood Pressure from Last 3 Encounters:   10/15/18 110/67   09/17/18 (!) 84/55   08/31/18 102/70    Weight from Last 3 Encounters:   10/15/18 129 lb 12.8 oz (58.9 kg)   09/17/18 126 lb (57.2 kg)   08/31/18 122 lb (55.3 kg)              Today, you had the following     No orders found for display       Primary Care Provider Office Phone # Fax #    Leo Ceja [phone] [address] [facility] [address]        Equal Access to Services     ROGELIO RAO :  Hadii cassia Hines, wasocratesda luqadaha, qarosariota kavivi lotustrupti, natasha tenisha chancekaran gautammerlene divinesoraida larowenakaran trae. So [facility] [phone].    ATENCIÓN: Si habla español, tiene a restrepo disposición servicios gratuitos de asistencia lingüística. Llame al [phone].    We comply with applicable federal civil rights laws and [place] laws. We do not discriminate on the basis of race, color, national origin, age, disability, sex, sexual orientation, or gender identity.            Thank you!     Thank you for choosing [facility]  [facility] your [facility]. Our goal is always to provide you with excellent care. Hearing back from our patients is one way we can continue to improve our services. Please take a few minutes to complete the written survey that you may receive in the mail after your visit with us. Thank you!             Your Updated Medication List - Protect others around you: Learn how to safely use, store and throw away your medicines at www.disposemymeds.org.          This list is accurate as of 10/15/18  7:46 AM.  Always use your most recent med list.                   Brand Name Dispense Instructions for use Diagnosis    PRENATAL VITAMIN PO      Take 1 tablet by mouth daily        PROBIOTIC DAILY PO

## 2018-10-15 NOTE — PROGRESS NOTES
Patient presents for routine prenatal visit at 18w3d.  Patient without complaint.   PE: See OB vitals  Body mass index is 21.6 kg/(m^2).    Questions asked and answered.    U/S: scheduled  Ezra Sapp MD FACOG

## 2018-10-15 NOTE — PATIENT INSTRUCTIONS
If you have any questions regarding your visit, Please contact your care team.    [facility] Access [facility]: [phone]      Women s Health CLINIC HOURS TELEPHONE NUMBER   MD Page Santoyo CMA Lisa -    MATTIE Russell       Monday:       7:30-4:30 [place]  Wednesday:       7:30-4:30 [place]  Thursday:       7:30-1:30 [place]  Friday:       7:30-11:30 [facility]  [address] [facility] [place]  [address]  [phone] ask for [facility]  [address] [place] [address]  [phone] ask for [facility]    Imaging Scheduling for [place]:  [phone]    Imaging Scheduling for [place]: [phone]       Urgent Care locations:    [facility] Saturday and Sunday   9 am - 5 pm    Monday-Friday   12 pm - 8 pm  Saturday and Sunday   9 am - 5 pm   [phone]     [facility] Labor and Delivery:  [phone]    If you need a medication refill, please contact your pharmacy. Please allow 3 business days for your refill to be completed.  As always, Thank you for trusting us with your healthcare needs!

## 2018-10-29 ENCOUNTER — RADIANT APPOINTMENT (OUTPATIENT)
Dept: ULTRASOUND IMAGING | Facility: CLINIC | Age: 33
End: 2018-10-29
Attending: NURSE PRACTITIONER
Payer: COMMERCIAL

## 2018-10-29 DIAGNOSIS — Z34.80 SUPERVISION OF OTHER NORMAL PREGNANCY, ANTEPARTUM: ICD-10-CM

## 2018-10-29 PROCEDURE — 76805 OB US >/= 14 WKS SNGL FETUS: CPT

## 2018-11-12 ENCOUNTER — PRENATAL OFFICE VISIT (OUTPATIENT)
Dept: OBGYN | Facility: CLINIC | Age: 33
End: 2018-11-12
Payer: COMMERCIAL

## 2018-11-12 VITALS
DIASTOLIC BLOOD PRESSURE: 50 MMHG | WEIGHT: 132.8 LBS | BODY MASS INDEX: 22.13 KG/M2 | HEIGHT: 65 IN | SYSTOLIC BLOOD PRESSURE: 115 MMHG | TEMPERATURE: 97.1 F | HEART RATE: 73 BPM | OXYGEN SATURATION: 99 %

## 2018-11-12 DIAGNOSIS — Z34.80 SUPERVISION OF OTHER NORMAL PREGNANCY, ANTEPARTUM: Primary | ICD-10-CM

## 2018-11-12 PROCEDURE — 99207 ZZC PRENATAL VISIT: CPT | Performed by: NURSE PRACTITIONER

## 2018-11-12 ASSESSMENT — PAIN SCALES - GENERAL: PAINLEVEL: NO PAIN (0)

## 2018-11-12 NOTE — PROGRESS NOTES
Patient presents for routine prenatal visit. Prenatal flowsheet reviewed and updated as needed.  Denies vaginal bleeding, loss of fluid, contractions or cramping.  Patient without complaint. Plan 1 hour GTT and HGB on return to clinic.  Diastolic blood pressures still low, but Systolic numbers improved after incorporating recommendations from Cardiology. No symptoms of low blood pressure.  Advice as per Anticipatory Guidance/Checklist updated.  PE: See OB vitals    Questions asked and answered. Next OB visit in 4 week(s) with Dr. Sapp.    Edilia CAVANAUGH CNP

## 2018-11-12 NOTE — MR AVS SNAPSHOT
After Visit Summary   11/12/2018    Mavis Krishnamurthy    MRN: [id number]           Patient Information     Date Of Birth          1985        Visit Information        Provider Department      11/12/2018 7:30 AM Edilia Nicole APRN CNP [facility]        Today's Diagnoses     Supervision of other normal pregnancy, antepartum    -  1       Follow-ups after your visit        Your next 10 appointments already scheduled     Dec 10, 2018  7:30 AM ELANA [facility] Established Prenatal with Ezra Sapp MD   [facility] ([facility]    [address] [facility] [address] [phone]              Future tests that were ordered for you today     Open Future Orders        Priority Expected Expires Ordered    Hemoglobin Routine  2/12/2019 11/12/2018    Glucose tolerance gest screen 1 hour Routine  2/12/2019 11/12/2018            Who to contact     If you have questions or need follow up information about today's clinic visit or your schedule please contact [facility] directly at [phone].  Normal or non-critical lab and imaging results will be communicated to you by Pricelockhart, letter or phone within 4 business days after the clinic has received the results. If you do not hear from us within 7 days, please contact the clinic through MedTech Solutionst or phone. If you have a critical or abnormal lab result, we will notify you by phone as soon as possible.  Submit refill requests through MediaHound or call your pharmacy and they will forward the refill request to us. Please allow 3 business days for your refill to be completed.          Additional Information About Your Visit        MyChart Information     MediaHound gives you secure access to your electronic health record. If you see a primary care provider, you can also send messages to your care team and make appointments. If you have questions, please call your primary care clinic.  If you do not  "have a primary care provider, please call [phone] and they will assist you.        Care EveryWhere ID     This is your Care EveryWhere ID. This could be used by other organizations to access your [place] medical records  [id number]        Your Vitals Were     Pulse Temperature Height Last Period Pulse Oximetry BMI (Body Mass Index)    73 97.1  F (36.2  C) (Oral) 5' 5\" (1.651 m) 06/08/2018 (Exact Date) 99% 22.1 kg/m2       Blood Pressure from Last 3 Encounters:   11/12/18 115/50   10/15/18 110/67   09/17/18 (!) 84/55    Weight from Last 3 Encounters:   11/12/18 132 lb 12.8 oz (60.2 kg)   10/15/18 129 lb 12.8 oz (58.9 kg)   09/17/18 126 lb (57.2 kg)               Primary Care Provider Office Phone # Fax #    Leo Tariq Ceja [phone] [address] [facility] [address]        Equal Access to Services     [facility]: Hadii cassia ku hadasho Soomaali, waaxda luqadaha, qaybta kaalmada adeegyasharmila, natasha torres . So [facility] [phone].    ATENCIÓN: Si habla español, tiene a restrepo disposición servicios gratuitos de asistencia lingüística. [facility] [phone].    We comply with applicable federal civil rights laws and [place] laws. We do not discriminate on the basis of race, color, national origin, age, disability, sex, sexual orientation, or gender identity.            Thank you!     Thank you for choosing [facility]  for your care. Our goal is always to provide you with excellent care. Hearing back from our patients is one way we can continue to improve our services. Please take a few minutes to complete the written survey that you may receive in the mail after your visit with us. Thank you!             Your Updated Medication List - Protect others around you: Learn how to safely use, store and throw away your medicines at www.disposemymeds.org.          This list is accurate as of 11/12/18  7:35 AM.  Always use your most recent med list.       "             Brand Name Dispense Instructions for use Diagnosis    PRENATAL VITAMIN PO      Take 1 tablet by mouth daily        PROBIOTIC DAILY PO

## 2018-11-12 NOTE — NURSING NOTE
"Chief Complaint   Patient presents with     Prenatal Care     22w3d       Initial /50  Pulse 73  Temp 97.1  F (36.2  C) (Oral)  Ht 5' 5\" (1.651 m)  Wt 132 lb 12.8 oz (60.2 kg)  LMP 06/08/2018 (Exact Date)  SpO2 99%  BMI 22.1 kg/m2 Estimated body mass index is 22.1 kg/(m^2) as calculated from the following:    Height as of this encounter: 5' 5\" (1.651 m).    Weight as of this encounter: 132 lb 12.8 oz (60.2 kg)..  BP completed using cuff size: thang Sellers CMA    "

## 2018-11-26 ENCOUNTER — MYC MEDICAL ADVICE (OUTPATIENT)
Dept: OBGYN | Facility: CLINIC | Age: 33
End: 2018-11-26

## 2018-12-10 ENCOUNTER — PRENATAL OFFICE VISIT (OUTPATIENT)
Dept: OBGYN | Facility: CLINIC | Age: 33
End: 2018-12-10
Payer: COMMERCIAL

## 2018-12-10 VITALS
WEIGHT: 137 LBS | OXYGEN SATURATION: 100 % | HEART RATE: 70 BPM | SYSTOLIC BLOOD PRESSURE: 106 MMHG | DIASTOLIC BLOOD PRESSURE: 55 MMHG | TEMPERATURE: 97.2 F | BODY MASS INDEX: 22.8 KG/M2

## 2018-12-10 DIAGNOSIS — Z34.80 SUPERVISION OF OTHER NORMAL PREGNANCY, ANTEPARTUM: ICD-10-CM

## 2018-12-10 DIAGNOSIS — L29.9 ITCHING: Primary | ICD-10-CM

## 2018-12-10 LAB
ALBUMIN SERPL-MCNC: 2.9 G/DL (ref 3.4–5)
ALP SERPL-CCNC: 72 U/L (ref 40–150)
ALT SERPL W P-5'-P-CCNC: 18 U/L (ref 0–50)
AST SERPL W P-5'-P-CCNC: 14 U/L (ref 0–45)
BILIRUB DIRECT SERPL-MCNC: 0.1 MG/DL (ref 0–0.2)
BILIRUB SERPL-MCNC: 0.4 MG/DL (ref 0.2–1.3)
GLUCOSE 1H P 50 G GLC PO SERPL-MCNC: 71 MG/DL (ref 60–129)
HGB BLD-MCNC: 12 G/DL (ref 11.7–15.7)
PROT SERPL-MCNC: 7.1 G/DL (ref 6.8–8.8)

## 2018-12-10 PROCEDURE — 99000 SPECIMEN HANDLING OFFICE-LAB: CPT | Performed by: OBSTETRICS & GYNECOLOGY

## 2018-12-10 PROCEDURE — 85018 HEMOGLOBIN: CPT | Performed by: NURSE PRACTITIONER

## 2018-12-10 PROCEDURE — 83789 MASS SPECTROMETRY QUAL/QUAN: CPT | Mod: 90 | Performed by: OBSTETRICS & GYNECOLOGY

## 2018-12-10 PROCEDURE — 36415 COLL VENOUS BLD VENIPUNCTURE: CPT | Performed by: NURSE PRACTITIONER

## 2018-12-10 PROCEDURE — 82950 GLUCOSE TEST: CPT | Performed by: NURSE PRACTITIONER

## 2018-12-10 PROCEDURE — 80076 HEPATIC FUNCTION PANEL: CPT | Performed by: OBSTETRICS & GYNECOLOGY

## 2018-12-10 PROCEDURE — 99207 ZZC PRENATAL VISIT: CPT | Performed by: OBSTETRICS & GYNECOLOGY

## 2018-12-10 RX ORDER — HYDROXYZINE PAMOATE 50 MG/1
50 CAPSULE ORAL 3 TIMES DAILY PRN
Qty: 30 CAPSULE | Refills: 1 | Status: SHIPPED | OUTPATIENT
Start: 2018-12-10 | End: 2019-02-05

## 2018-12-10 NOTE — PROGRESS NOTES
Patient presents for routine prenatal visit at 26w3d.  Patient with complaint. She is itching, much like she did last pregnancy with Cholestasis.  Will test labs  PE: See OB vitals  Body mass index is 22.8 kg/m .    No vaginal bleeding, no contractions, no leakage of fluid  No nausea/vomiting. No heartburn  No vaginal discharge. No dysuria.   No headache, vision changes, lower extremity swelling, upper abdominal pain, chest pain, shortness of breath  Tdap planned next visit    Questions asked and answered.  Bile salts due to itching  1 hour gtt today    Ezra Sapp MD FACOG

## 2018-12-10 NOTE — PATIENT INSTRUCTIONS
If you have any questions regarding your visit, Please contact your care team.    [facility] Access Services: [phone]      Women s Health CLINIC HOURS TELEPHONE NUMBER   MD Page Santoyo CMA Lisa -    MATTIE Russell       Monday:       7:30-4:30 [place]  Wednesday:       7:30-4:30 [place]  Thursday:       7:30-1:30 [place]  Friday:       7:30-11:30 [facility]  [address] [facility] [place]  [address]  [phone] ask for Women[facility]  [address] [place] [address]  [phone] ask for [facility]    Imaging Scheduling for [place]:  [phone]    Imaging Scheduling for [place]: [phone]       Urgent Care locations:    [facility] Saturday and Sunday   9 am - 5 pm    Monday-Friday   12 pm - 8 pm  Saturday and Sunday   9 am - 5 pm   [phone]     [facility] Labor and Delivery:  [phone]    If you need a medication refill, please contact your pharmacy. Please allow 3 business days for your refill to be completed.  As always, Thank you for trusting us with your healthcare needs!

## 2018-12-14 LAB
BILE AC SERPL-SCNC: 1.4 UMOL/L (ref 0–2.5)
CDCAE SERPL-SCNC: 0.7 UMOL/L (ref 0–3.4)
CHOLATE SERPL-SCNC: 2.7 UMOL/L (ref 0–7)
DO-CHOLATE SERPL-SCNC: 0.3 UMOL/L (ref 0–1.9)
URSODEOXYCHOLATE SERPL-SCNC: 0.3 UMOL/L (ref 0–1)

## 2018-12-26 ENCOUNTER — PRENATAL OFFICE VISIT (OUTPATIENT)
Dept: OBGYN | Facility: CLINIC | Age: 33
End: 2018-12-26
Payer: COMMERCIAL

## 2018-12-26 VITALS
BODY MASS INDEX: 22.8 KG/M2 | WEIGHT: 137 LBS | SYSTOLIC BLOOD PRESSURE: 99 MMHG | OXYGEN SATURATION: 100 % | HEART RATE: 73 BPM | DIASTOLIC BLOOD PRESSURE: 58 MMHG | TEMPERATURE: 98.2 F

## 2018-12-26 DIAGNOSIS — Z23 NEED FOR TDAP VACCINATION: ICD-10-CM

## 2018-12-26 DIAGNOSIS — L29.9 ITCHING: ICD-10-CM

## 2018-12-26 DIAGNOSIS — Z34.80 SUPERVISION OF OTHER NORMAL PREGNANCY, ANTEPARTUM: Primary | ICD-10-CM

## 2018-12-26 LAB
ALBUMIN SERPL-MCNC: 2.9 G/DL (ref 3.4–5)
ALP SERPL-CCNC: 77 U/L (ref 40–150)
ALT SERPL W P-5'-P-CCNC: 18 U/L (ref 0–50)
AST SERPL W P-5'-P-CCNC: 11 U/L (ref 0–45)
BILIRUB DIRECT SERPL-MCNC: <0.1 MG/DL (ref 0–0.2)
BILIRUB SERPL-MCNC: 0.4 MG/DL (ref 0.2–1.3)
PROT SERPL-MCNC: 7.3 G/DL (ref 6.8–8.8)

## 2018-12-26 PROCEDURE — 80076 HEPATIC FUNCTION PANEL: CPT | Performed by: NURSE PRACTITIONER

## 2018-12-26 PROCEDURE — 90471 IMMUNIZATION ADMIN: CPT | Performed by: NURSE PRACTITIONER

## 2018-12-26 PROCEDURE — 36415 COLL VENOUS BLD VENIPUNCTURE: CPT | Performed by: NURSE PRACTITIONER

## 2018-12-26 PROCEDURE — 99000 SPECIMEN HANDLING OFFICE-LAB: CPT | Performed by: NURSE PRACTITIONER

## 2018-12-26 PROCEDURE — 83789 MASS SPECTROMETRY QUAL/QUAN: CPT | Mod: 90 | Performed by: NURSE PRACTITIONER

## 2018-12-26 PROCEDURE — 90715 TDAP VACCINE 7 YRS/> IM: CPT | Performed by: NURSE PRACTITIONER

## 2018-12-26 PROCEDURE — 99207 ZZC PRENATAL VISIT: CPT | Performed by: NURSE PRACTITIONER

## 2018-12-26 NOTE — NURSING NOTE
Screening Questionnaire for Adult Immunization    Are you sick today?   No   Do you have allergies to medications, food, a vaccine component or latex?   No   Have you ever had a serious reaction after receiving a vaccination?   No   Do you have a long-term health problem with heart disease, lung disease, asthma, kidney disease, metabolic disease (e.g. diabetes), anemia, or other blood disorder?   Yes   Do you have cancer, leukemia, HIV/AIDS, or any other immune system problem?   No   In the past 3 months, have you taken medications that affect  your immune system, such as prednisone, other steroids, or anticancer drugs; drugs for the treatment of rheumatoid arthritis, Crohn s disease, or psoriasis; or have you had radiation treatments?   No   Have you had a seizure, or a brain or other nervous system problem?   No   During the past year, have you received a transfusion of blood or blood     products, or been given immune (gamma) globulin or antiviral drug?   No   For women: Are you pregnant or is there a chance you could become        pregnant during the next month?   Yes   Have you received any vaccinations in the past 4 weeks?   No     Immunization questionnaire was positive for at least one answer.  Notified MICAH Meza CPR.        Per orders of Edilia Nicole, injection of TDAP given by Page Dotson. Patient instructed to remain in clinic for 15 minutes afterwards, and to report any adverse reaction to me immediately.       Screening performed by Page Dotson on 12/26/2018 at 7:36 AM.

## 2018-12-26 NOTE — PROGRESS NOTES
Patient presents for routine prenatal visit. Prenatal flowsheet reviewed and updated as needed.  Denies vaginal bleeding, loss of fluid, contractions or cramping.  Patient with complaint. Does continue to have itching-no worse and no better. Only takes Vistaril at night due to drowsiness. Will recheck her labs today.  Tdap given.  Has not received call from Cardiology to schedule ECHO-will call today to set up appointment for 30 weeks.  Advice as per Anticipatory Guidance/Checklist updated.  PE: See OB vitals    Questions asked and answered. Next OB visit in 2 week(s) with Dr. Sapp.    Edilia CAVANAUGH CNP

## 2018-12-26 NOTE — PROGRESS NOTES
TDAP Vaccine (Adacel)     Date Status Dose VIS Date Route Site  Lot# Given By Verified By   12/26/2018 Given 0.5 mL 02/24/2015, Given Today IM LD [employer] [id number] Page Dotson MA --   Exp. Date NDC # Product Time Location External Comment   11/2/2020 81642-048-46 ADACEL --  --    Updated by: Page Dotson MA on 12/26/2018  8:08 AM

## 2018-12-30 LAB
BILE AC SERPL-SCNC: 0.6 UMOL/L (ref 0–2.5)
CDCAE SERPL-SCNC: 0.3 UMOL/L (ref 0–3.4)
CHOLATE SERPL-SCNC: 1.5 UMOL/L (ref 0–7)
DO-CHOLATE SERPL-SCNC: 0.3 UMOL/L (ref 0–1.9)
URSODEOXYCHOLATE SERPL-SCNC: 0.3 UMOL/L (ref 0–1)

## 2019-01-07 ENCOUNTER — PRENATAL OFFICE VISIT (OUTPATIENT)
Dept: OBGYN | Facility: CLINIC | Age: 34
End: 2019-01-07
Payer: COMMERCIAL

## 2019-01-07 VITALS
HEIGHT: 65 IN | SYSTOLIC BLOOD PRESSURE: 105 MMHG | HEART RATE: 72 BPM | OXYGEN SATURATION: 100 % | TEMPERATURE: 97.5 F | DIASTOLIC BLOOD PRESSURE: 65 MMHG | BODY MASS INDEX: 22.96 KG/M2 | WEIGHT: 137.8 LBS

## 2019-01-07 DIAGNOSIS — Z34.80 SUPERVISION OF OTHER NORMAL PREGNANCY, ANTEPARTUM: Primary | ICD-10-CM

## 2019-01-07 PROCEDURE — 99207 ZZC PRENATAL VISIT: CPT | Performed by: OBSTETRICS & GYNECOLOGY

## 2019-01-07 ASSESSMENT — MIFFLIN-ST. JEOR: SCORE: 1335.94

## 2019-01-07 ASSESSMENT — PAIN SCALES - GENERAL: PAINLEVEL: NO PAIN (0)

## 2019-01-07 NOTE — PROGRESS NOTES
Patient presents for routine prenatal visit at 30w3d.  Patient with complaint. Still itching, but normal bile salts X2  PE: See OB vitals  Body mass index is 22.93 kg/m .    No vaginal bleeding, loss of fluid, or contractions  Reportable signs and symptoms discussed.  Questions asked and answered.      Ezra Sapp MD FACOG

## 2019-01-21 ENCOUNTER — PRENATAL OFFICE VISIT (OUTPATIENT)
Dept: OBGYN | Facility: CLINIC | Age: 34
End: 2019-01-21
Payer: COMMERCIAL

## 2019-01-21 VITALS
HEART RATE: 90 BPM | DIASTOLIC BLOOD PRESSURE: 64 MMHG | SYSTOLIC BLOOD PRESSURE: 112 MMHG | WEIGHT: 139.6 LBS | OXYGEN SATURATION: 100 % | BODY MASS INDEX: 23.23 KG/M2 | TEMPERATURE: 98.7 F

## 2019-01-21 DIAGNOSIS — Z34.80 SUPERVISION OF OTHER NORMAL PREGNANCY, ANTEPARTUM: Primary | ICD-10-CM

## 2019-01-21 DIAGNOSIS — Q23.81 BICUSPID AORTIC VALVE: ICD-10-CM

## 2019-01-21 PROCEDURE — 99207 ZZC PRENATAL VISIT: CPT | Performed by: OBSTETRICS & GYNECOLOGY

## 2019-01-21 NOTE — PROGRESS NOTES
Patient presents for routine prenatal visit at 32w3d.  Patient without complaint.   PE: See OB vitals  There is no height or weight on file to calculate BMI.    Doing well.  No concerns today.  No vaginal bleeding, loss of fluid, or contractions  Tdap given today  Questions asked and answered.      Ezra Sapp MD FACOG

## 2019-01-21 NOTE — PATIENT INSTRUCTIONS
If you have any questions regarding your visit, Please contact your care team.    [facility] Access Services: [phone]      Women s Health CLINIC HOURS TELEPHONE NUMBER   MD Page Santoyo CMA Lisa -    MATTIE Russell       Monday:       7:30-4:30 [place]  Wednesday:       7:30-4:30 [place]  Thursday:       7:30-1:30 [place]  Friday:       7:30-11:30 [facility]  [address] [place]  [address]  [phone] ask for [facility]  [address] [place] [address]  [phone] ask for [facility]    Imaging Scheduling for [place]:  [phone]    Imaging Scheduling for [place]: [phone]       Urgent Care locations:    [facility] Saturday and Sunday   9 am - 5 pm    Monday-Friday   12 pm - 8 pm  Saturday and Sunday   9 am - 5 pm   [phone]     [facility] Labor and Delivery:  [phone]    If you need a medication refill, please contact your pharmacy. Please allow 3 business days for your refill to be completed.  As always, Thank you for trusting us with your healthcare needs!

## 2019-02-04 ENCOUNTER — TELEPHONE (OUTPATIENT)
Dept: OBGYN | Facility: CLINIC | Age: 34
End: 2019-02-04

## 2019-02-04 NOTE — TELEPHONE ENCOUNTER
Phone call placed to pt. Pt reports stepped out out of car and fell on her buttocks at approx at 3:30.  Pt denies LOF, bleeding, ctx or pain.  Pt has been advised to present to L&D for assessment. Pt verbalized understanding. Dr. Mathis and L&D updated. Pt will arrive at approx  5:00 pm. Lauren Chatterjee RN on 2/4/2019 at 4:38 PM

## 2019-02-05 ENCOUNTER — PRENATAL OFFICE VISIT (OUTPATIENT)
Dept: OBGYN | Facility: CLINIC | Age: 34
End: 2019-02-05
Payer: COMMERCIAL

## 2019-02-05 VITALS
TEMPERATURE: 97.7 F | SYSTOLIC BLOOD PRESSURE: 114 MMHG | HEART RATE: 60 BPM | OXYGEN SATURATION: 100 % | DIASTOLIC BLOOD PRESSURE: 76 MMHG | WEIGHT: 141.6 LBS | HEIGHT: 65 IN | BODY MASS INDEX: 23.59 KG/M2

## 2019-02-05 DIAGNOSIS — Z34.80 SUPERVISION OF OTHER NORMAL PREGNANCY, ANTEPARTUM: Primary | ICD-10-CM

## 2019-02-05 DIAGNOSIS — L29.9 ITCHING: ICD-10-CM

## 2019-02-05 LAB
ALBUMIN SERPL-MCNC: 3 G/DL (ref 3.4–5)
ALP SERPL-CCNC: 115 U/L (ref 40–150)
ALT SERPL W P-5'-P-CCNC: 21 U/L (ref 0–50)
AST SERPL W P-5'-P-CCNC: 17 U/L (ref 0–45)
BILIRUB DIRECT SERPL-MCNC: 0.2 MG/DL (ref 0–0.2)
BILIRUB SERPL-MCNC: 0.4 MG/DL (ref 0.2–1.3)
PROT SERPL-MCNC: 7.2 G/DL (ref 6.8–8.8)

## 2019-02-05 PROCEDURE — 99207 ZZC PRENATAL VISIT: CPT | Performed by: NURSE PRACTITIONER

## 2019-02-05 PROCEDURE — 80076 HEPATIC FUNCTION PANEL: CPT | Performed by: NURSE PRACTITIONER

## 2019-02-05 PROCEDURE — 83789 MASS SPECTROMETRY QUAL/QUAN: CPT | Mod: 90 | Performed by: NURSE PRACTITIONER

## 2019-02-05 PROCEDURE — 99000 SPECIMEN HANDLING OFFICE-LAB: CPT | Performed by: NURSE PRACTITIONER

## 2019-02-05 PROCEDURE — 59025 FETAL NON-STRESS TEST: CPT | Performed by: NURSE PRACTITIONER

## 2019-02-05 PROCEDURE — 36415 COLL VENOUS BLD VENIPUNCTURE: CPT | Performed by: NURSE PRACTITIONER

## 2019-02-05 ASSESSMENT — PAIN SCALES - GENERAL: PAINLEVEL: MILD PAIN (3)

## 2019-02-05 ASSESSMENT — MIFFLIN-ST. JEOR: SCORE: 1348.17

## 2019-02-05 NOTE — PROGRESS NOTES
Patient presents for routine prenatal visit. Prenatal flowsheet reviewed and updated as needed.  Denies vaginal bleeding, loss of fluid, contractions or cramping.  Patient with complaint. Fell yesterday afternoon on the ice and landed on her back. Was evaluated in L&D. She is sore today. Denies any vaginal bleeding, contractions or cramping, but having decrease in fetal movement today. Still having movement, but less than normal.   Also, itching had been fairly stable in the last few weeks, but in the last 5 days, it has increased.   Advice as per Anticipatory Guidance/Checklist updated.  PE: See OB vitals    Decreased fetal movement:  NST IS:  Reactive (2 accl > 15 BPM in 20 min., each lasting approx. 15 seconds)  NST Baseline Rate 130s-140s  Variability:  Average  Accelerations:Present  Variable Decelerations:No  Other Decelerations:No  Contractions: None    Warning signs to monitor for and report immediately and also when to proceed to L&D discussed with patient and she verbalizes understanding.  If labs normal today, she requests to recheck this prior to next visit and will then enter lab orders for her.   GBS and ultrasound for fetal position on return to clinic.    Questions asked and answered. Next OB visit in 2 week(s) with Dr. Sapp.    Edilia CAVANAUGH CNP

## 2019-02-08 ENCOUNTER — MYC MEDICAL ADVICE (OUTPATIENT)
Dept: OBGYN | Facility: CLINIC | Age: 34
End: 2019-02-08

## 2019-02-08 ENCOUNTER — TELEPHONE (OUTPATIENT)
Dept: OBGYN | Facility: CLINIC | Age: 34
End: 2019-02-08

## 2019-02-08 DIAGNOSIS — L29.9 ITCHING: Primary | ICD-10-CM

## 2019-02-08 LAB
BILE AC SERPL-SCNC: 1.2 UMOL/L (ref 0–2.5)
CDCAE SERPL-SCNC: 0.8 UMOL/L (ref 0–3.4)
CHOLATE SERPL-SCNC: 2.9 UMOL/L (ref 0–7)
DO-CHOLATE SERPL-SCNC: 0.6 UMOL/L (ref 0–1.9)
URSODEOXYCHOLATE SERPL-SCNC: 0.3 UMOL/L (ref 0–1)

## 2019-02-08 NOTE — TELEPHONE ENCOUNTER
Received phone call from pt. Pt reports fell on 2/4/19 presented to L&D. Pt and baby assessed and discharged.  Had appointment with AFSHAN Meza, RICK on 2/5/19.  Pt and baby doing well. Pt advised to lay low and monitor at home and to call back with issues.  Pt reports baby not as active as the other days. Pt denies pain, ctx, LOF and bleeding. Pt has been advised to void, eat a healthy snack/lunch, drink 2 glasses of water, lie down on left side for 2 hours.  If no movement in one hour call back.  Once pt get to 10 test is over, if do not get to 10 in 2 hours to call back.  Pt verbalized understanding and agrees with plan of care. Lauren Chatterjee RN on 2/8/2019 at 11:35 AM

## 2019-02-17 ENCOUNTER — NURSE TRIAGE (OUTPATIENT)
Dept: NURSING | Facility: CLINIC | Age: 34
End: 2019-02-17

## 2019-02-17 DIAGNOSIS — L29.9 ITCHING: ICD-10-CM

## 2019-02-17 PROCEDURE — 99000 SPECIMEN HANDLING OFFICE-LAB: CPT | Performed by: NURSE PRACTITIONER

## 2019-02-17 PROCEDURE — 83789 MASS SPECTROMETRY QUAL/QUAN: CPT | Mod: 90 | Performed by: NURSE PRACTITIONER

## 2019-02-17 PROCEDURE — 36415 COLL VENOUS BLD VENIPUNCTURE: CPT | Performed by: NURSE PRACTITIONER

## 2019-02-17 PROCEDURE — 80076 HEPATIC FUNCTION PANEL: CPT | Performed by: NURSE PRACTITIONER

## 2019-02-17 NOTE — TELEPHONE ENCOUNTER
FNA triage call :   Presenting problem : Pt called and is 36 weeks pregnant, JERMAIN =3/15/19 and OB provide Dr RITA Sapp at [facility] .   Kicking < in the last usual , and injury at 34 weeks and seen in L & D at that time and did a kick count but not sure if any kicks  in last . 45-1.5 hours.   Guideline used : pregnancy - < fetal movement - adult   Disposition and recommendations : @ 316pm smart web  paged DR RITA Lee to Pt at [phone] for 2nd level disposition and advised Pt to call back if no answer to page in 20 minutes or more problems.   Caller verbalizes understanding and denies further questions and will call back if further symptoms to triage or questions  . Yas Reddy RN  - [facility] Nurse Advisor       Reason for Disposition    [1] Pregnant 23 or more weeks AND [2] baby moving less today by kick count  (e.g., kick count < 5 in 1 hour or < 10 in 2 hours)    Additional Information    Negative: Sounds like a life-threatening emergency to the triager    Negative: Injury to abdomen    Negative: [1] Pregnant > 36 weeks AND [2] having contractions or other symptoms of labor    Negative: [1] Pregnant < 37 weeks AND [2] having contractions or other symptoms of labor    Negative: [1] Pregnant > 20 weeks AND [2] abdominal pain    Negative: [1] Pregnant > 20 weeks AND [2] vaginal bleeding or spotting    Negative: Blurred vision or visual change    Negative: [1] SEVERE headache AND [2] not relieved with acetaminophen (e.g., Tylenol)    Negative: Leakage of fluid from vagina    Protocols used: PREGNANCY - DECREASED FETAL MOVEMENT-ADULT-

## 2019-02-18 ENCOUNTER — TELEPHONE (OUTPATIENT)
Dept: OBGYN | Facility: CLINIC | Age: 34
End: 2019-02-18

## 2019-02-18 LAB
ALBUMIN SERPL-MCNC: 2.9 G/DL (ref 3.4–5)
ALP SERPL-CCNC: 131 U/L (ref 40–150)
ALT SERPL W P-5'-P-CCNC: 19 U/L (ref 0–50)
AST SERPL W P-5'-P-CCNC: 16 U/L (ref 0–45)
BILIRUB DIRECT SERPL-MCNC: 0.1 MG/DL (ref 0–0.2)
BILIRUB SERPL-MCNC: 0.3 MG/DL (ref 0.2–1.3)
PROT SERPL-MCNC: 7 G/DL (ref 6.8–8.8)

## 2019-02-18 NOTE — TELEPHONE ENCOUNTER
Patient calling, last night she went to [place] labor and delivery. She was not feeling any movement for 5 hours. They did an NST and BPP. Baby passed both, but did lose points on the BPP for tone.     They suggested that she follow up today or tomorrow for a repeat NST/BPP. She is wondering if it is okay to wait until her appointment later in the week.     States she is feeling movement now, but less than usual.

## 2019-02-19 ENCOUNTER — TELEPHONE (OUTPATIENT)
Dept: OBGYN | Facility: CLINIC | Age: 34
End: 2019-02-19

## 2019-02-19 DIAGNOSIS — O36.8190 DECREASED FETAL MOVEMENT: Primary | ICD-10-CM

## 2019-02-19 NOTE — TELEPHONE ENCOUNTER
Patient calling to speak to OB nurse sent a message and has not heard back. Would like a call back as soon as possible.

## 2019-02-19 NOTE — TELEPHONE ENCOUNTER
Received phone call from [facility] scheduling.  Patient wanting to follow up on message left 2/18.

## 2019-02-19 NOTE — TELEPHONE ENCOUNTER
Phone call placed to pt. Scheduled f/u NST/BPP and 36/37 week check up tomorrow 2/20/19.  Pt advised to arrive for the BPP with a full bladder. Pt verbalized understanding.Lauren Chatterjee RN on 2/19/2019 at 10:22 AM

## 2019-02-20 ENCOUNTER — OFFICE VISIT (OUTPATIENT)
Dept: OBGYN | Facility: CLINIC | Age: 34
End: 2019-02-20
Payer: COMMERCIAL

## 2019-02-20 ENCOUNTER — ANCILLARY PROCEDURE (OUTPATIENT)
Dept: ULTRASOUND IMAGING | Facility: CLINIC | Age: 34
End: 2019-02-20
Attending: OBSTETRICS & GYNECOLOGY
Payer: COMMERCIAL

## 2019-02-20 VITALS
SYSTOLIC BLOOD PRESSURE: 107 MMHG | HEART RATE: 81 BPM | WEIGHT: 145.25 LBS | BODY MASS INDEX: 24.17 KG/M2 | DIASTOLIC BLOOD PRESSURE: 65 MMHG

## 2019-02-20 DIAGNOSIS — O36.8190 DECREASED FETAL MOVEMENT: ICD-10-CM

## 2019-02-20 DIAGNOSIS — Z34.80 SUPERVISION OF OTHER NORMAL PREGNANCY, ANTEPARTUM: Primary | ICD-10-CM

## 2019-02-20 DIAGNOSIS — O36.8190 DECREASED FETAL MOVEMENT AFFECTING MANAGEMENT OF PREGNANCY, ANTEPARTUM, SINGLE OR UNSPECIFIED FETUS: ICD-10-CM

## 2019-02-20 PROCEDURE — 59025 FETAL NON-STRESS TEST: CPT | Performed by: OBSTETRICS & GYNECOLOGY

## 2019-02-20 PROCEDURE — 76819 FETAL BIOPHYS PROFIL W/O NST: CPT | Performed by: STUDENT IN AN ORGANIZED HEALTH CARE EDUCATION/TRAINING PROGRAM

## 2019-02-20 PROCEDURE — 99207 ZZC PRENATAL VISIT: CPT | Performed by: OBSTETRICS & GYNECOLOGY

## 2019-02-20 PROCEDURE — 87653 STREP B DNA AMP PROBE: CPT | Performed by: OBSTETRICS & GYNECOLOGY

## 2019-02-20 NOTE — PROGRESS NOTES
Patient presents for routine prenatal visit at 36w5d.  Patient with complaint. She reports decreased fetal activity, at times.  She was seen in LABOR AND DELIVER and had a reactive NON-STRESS TEST.  PE: See OB vitals  Body mass index is 24.17 kg/m .  No vaginal bleeding, LOF, contractions.  No HA, RUQ pain, N/V, visual changes.  Cervix is posterior, long, closed and firm and 2/75/-2.  Cephalic position confirmed by Leopold maneuvers and ultrasound.  NST done today and was reactive.    Group B Strep was done    Fetal Age: 36 weeks 4 days  OPAL: 13.3 cm  Fetal Heart Rate: 136 beats per minute  Fetal Orientation: cephalic  Placental Location: posterior     Fetal Breathing Movements : 2/2  Gross Body Movements: 2/2  Fetal Tone: 2/2  Amniotic Fluid Volume: 2/2     Total Score: 8/8          Decreased Fetal Movement  NST IS:  Reactive (2 accl > 15 BPM in 20 min., each lasting approx. 15 seconds)  NST Baseline Rate 130  Variability:  Average  Accelerations:Present  Variable Decelerations:No  Other Decelerations:No  Contractions: Irregular    Further Comments:      Plans:  Weekly visits, continue doing kick counts                                                             IMPRESSION: Normal biophysical profile.  Normal Amniotic Fluid Volume is present.  Questions asked and answered.    Ezra Sapp MD FACOG

## 2019-02-21 LAB
GP B STREP DNA SPEC QL NAA+PROBE: NEGATIVE
SPECIMEN SOURCE: NORMAL

## 2019-02-22 LAB
BILE AC SERPL-SCNC: 1.1 UMOL/L (ref 0–2.5)
CDCAE SERPL-SCNC: 0.9 UMOL/L (ref 0–3.4)
CHOLATE SERPL-SCNC: 2.9 UMOL/L (ref 0–7)
DO-CHOLATE SERPL-SCNC: 0.6 UMOL/L (ref 0–1.9)
URSODEOXYCHOLATE SERPL-SCNC: 0.3 UMOL/L (ref 0–1)

## 2019-02-24 ENCOUNTER — MYC MEDICAL ADVICE (OUTPATIENT)
Dept: OBGYN | Facility: CLINIC | Age: 34
End: 2019-02-24

## 2019-02-24 DIAGNOSIS — L29.9 ITCHING: Primary | ICD-10-CM

## 2019-02-25 ENCOUNTER — MYC MEDICAL ADVICE (OUTPATIENT)
Dept: OBGYN | Facility: CLINIC | Age: 34
End: 2019-02-25

## 2019-02-25 DIAGNOSIS — L29.9 ITCHING: ICD-10-CM

## 2019-02-25 LAB
ALBUMIN SERPL-MCNC: 3.1 G/DL (ref 3.4–5)
ALP SERPL-CCNC: 155 U/L (ref 40–150)
ALT SERPL W P-5'-P-CCNC: 27 U/L (ref 0–50)
AST SERPL W P-5'-P-CCNC: 20 U/L (ref 0–45)
BILIRUB DIRECT SERPL-MCNC: 0.2 MG/DL (ref 0–0.2)
BILIRUB SERPL-MCNC: 0.5 MG/DL (ref 0.2–1.3)
PROT SERPL-MCNC: 7.7 G/DL (ref 6.8–8.8)

## 2019-02-25 PROCEDURE — 83789 MASS SPECTROMETRY QUAL/QUAN: CPT | Mod: 90 | Performed by: NURSE PRACTITIONER

## 2019-02-25 PROCEDURE — 99000 SPECIMEN HANDLING OFFICE-LAB: CPT | Performed by: NURSE PRACTITIONER

## 2019-02-25 PROCEDURE — 36415 COLL VENOUS BLD VENIPUNCTURE: CPT | Performed by: NURSE PRACTITIONER

## 2019-02-25 PROCEDURE — 80076 HEPATIC FUNCTION PANEL: CPT | Performed by: NURSE PRACTITIONER

## 2019-02-25 NOTE — TELEPHONE ENCOUNTER
[employer] message routed to provider for review and response. Please advise.      Meg Connor CMA  2:39 PM  February 25, 2019

## 2019-03-01 ENCOUNTER — PRENATAL OFFICE VISIT (OUTPATIENT)
Dept: OBGYN | Facility: CLINIC | Age: 34
End: 2019-03-01
Payer: COMMERCIAL

## 2019-03-01 VITALS
DIASTOLIC BLOOD PRESSURE: 66 MMHG | HEART RATE: 76 BPM | WEIGHT: 145.6 LBS | TEMPERATURE: 98.3 F | SYSTOLIC BLOOD PRESSURE: 120 MMHG | BODY MASS INDEX: 24.26 KG/M2 | OXYGEN SATURATION: 100 % | HEIGHT: 65 IN

## 2019-03-01 DIAGNOSIS — L29.9 ITCHING: ICD-10-CM

## 2019-03-01 DIAGNOSIS — Z34.80 SUPERVISION OF OTHER NORMAL PREGNANCY, ANTEPARTUM: Primary | ICD-10-CM

## 2019-03-01 LAB
BILE AC SERPL-SCNC: 1 UMOL/L (ref 0–2.5)
CDCAE SERPL-SCNC: 0.9 UMOL/L (ref 0–3.4)
CHOLATE SERPL-SCNC: 2.9 UMOL/L (ref 0–7)
DO-CHOLATE SERPL-SCNC: 0.7 UMOL/L (ref 0–1.9)
URSODEOXYCHOLATE SERPL-SCNC: 0.3 UMOL/L (ref 0–1)

## 2019-03-01 PROCEDURE — 59025 FETAL NON-STRESS TEST: CPT | Performed by: NURSE PRACTITIONER

## 2019-03-01 PROCEDURE — 99207 ZZC PRENATAL VISIT: CPT | Performed by: NURSE PRACTITIONER

## 2019-03-01 ASSESSMENT — MIFFLIN-ST. JEOR: SCORE: 1366.32

## 2019-03-01 ASSESSMENT — PAIN SCALES - GENERAL: PAINLEVEL: NO PAIN (0)

## 2019-03-01 NOTE — PROGRESS NOTES
Patient presents for routine prenatal visit. Prenatal flowsheet reviewed and updated as needed.  Denies vaginal bleeding, loss of fluid, contractions or cramping. Denies HA, N/V, RUQ pain and vision changes.  Patient with complaint. Continues to have itching. Bile acids still in progress. Discussed hepatic panel.    Advice as per Anticipatory Guidance/Checklist updated.  PE: See OB vitals    Itching  NST IS:  Reactive (2 accl > 15 BPM in 20 min., each lasting approx. 15 seconds)  NST Baseline Rate 130s  Variability:  Average  Accelerations:Present  Variable Decelerations:No  Other Decelerations:No  Contractions: None    Further Comments:  Requesting induction next Friday at 39 weeks-we did review risks vs benefits. Induction scheduled for 7 am on 3/8/19. If Bile acids abnormal, will follow up with patient and change plan as needed.    Questions asked and answered.     Edilia CAVANAUGH CNP

## 2019-03-06 ENCOUNTER — PRENATAL OFFICE VISIT (OUTPATIENT)
Dept: OBGYN | Facility: CLINIC | Age: 34
End: 2019-03-06
Payer: COMMERCIAL

## 2019-03-06 VITALS
HEART RATE: 85 BPM | WEIGHT: 146.1 LBS | BODY MASS INDEX: 24.31 KG/M2 | DIASTOLIC BLOOD PRESSURE: 71 MMHG | SYSTOLIC BLOOD PRESSURE: 123 MMHG | TEMPERATURE: 98.2 F

## 2019-03-06 DIAGNOSIS — O36.8190 DECREASED FETAL MOVEMENT AFFECTING MANAGEMENT OF PREGNANCY, ANTEPARTUM, SINGLE OR UNSPECIFIED FETUS: ICD-10-CM

## 2019-03-06 DIAGNOSIS — Z34.80 SUPERVISION OF OTHER NORMAL PREGNANCY, ANTEPARTUM: Primary | ICD-10-CM

## 2019-03-06 DIAGNOSIS — Q23.81 BICUSPID AORTIC VALVE: ICD-10-CM

## 2019-03-06 PROCEDURE — 59025 FETAL NON-STRESS TEST: CPT | Performed by: OBSTETRICS & GYNECOLOGY

## 2019-03-06 PROCEDURE — 99207 ZZC PRENATAL VISIT: CPT | Performed by: OBSTETRICS & GYNECOLOGY

## 2019-03-06 NOTE — PATIENT INSTRUCTIONS
If you have any questions regarding your visit, Please contact your care team.    [facility]: [phone]      Women s Health CLINIC HOURS TELEPHONE NUMBER   MD Page Santoyo CMA Lisa -    MATTIE Russell       Monday:       7:30-4:30 [place]  Wednesday:       7:30-4:30 [place]  Thursday:       7:30-1:30 [place]  Friday:       7:30-11:30 [facility]  [address] [facility] [place]  [address]  [phone] ask for [facility]  [address] [place] [address]  [phone] ask for [facility]    Imaging Scheduling for [place]:  [phone]    Imaging Scheduling for [place]: [phone]       Urgent Care locations:    [facility] Saturday and Sunday   9 am - 5 pm    Monday-Friday   12 pm - 8 pm  Saturday and Sunday   9 am - 5 pm   [phone]     [facility] Labor and Delivery:  [phone]    If you need a medication refill, please contact your pharmacy. Please allow 3 business days for your refill to be completed.  As always, Thank you for trusting us with your healthcare needs!

## 2019-03-06 NOTE — PROGRESS NOTES
Patient presents for routine prenatal visit at 38w5d.  Patient with complaint. Still itching  PE: See OB vitals  There is no height or weight on file to calculate BMI.  No vaginal bleeding, LOF, contractions.  No HA, RUQ pain, N/V, visual changes.  Questions asked and answered.  Decreased Fetal Movement  NST IS:  Reactive (2 accl > 15 BPM in 20 min., each lasting approx. 15 seconds)  NST Baseline Rate 130  Variability:  Average  Accelerations:Present  Variable Decelerations:No  Other Decelerations:No  Contractions: none    Further Comments:      Plans:  iOL planned 39 weeks    Ezra Sapp MD FACOG

## 2019-03-12 ENCOUNTER — TELEPHONE (OUTPATIENT)
Dept: FAMILY MEDICINE | Facility: CLINIC | Age: 34
End: 2019-03-12

## 2019-03-12 NOTE — TELEPHONE ENCOUNTER
Reason for Call:  Other call back    Detailed comments: soren from [facility] called.  Needs to know delivery date to set up maternity leave.  Please call her back.  Caller informed that calls received after 3pm may not be returned same day.  Thank you    Phone Number Patient can be reached at: Other phone number:  [phone]    Best Time: any  Can we leave a detailed message on this number? YES    Call taken on 3/12/2019 at 5:02 PM by Jolie Forrester

## 2019-03-13 ENCOUNTER — OFFICE VISIT (OUTPATIENT)
Dept: OBGYN | Facility: CLINIC | Age: 34
End: 2019-03-13
Payer: COMMERCIAL

## 2019-03-13 VITALS
SYSTOLIC BLOOD PRESSURE: 131 MMHG | HEIGHT: 65 IN | HEART RATE: 66 BPM | OXYGEN SATURATION: 99 % | DIASTOLIC BLOOD PRESSURE: 74 MMHG | BODY MASS INDEX: 22.69 KG/M2 | TEMPERATURE: 99.3 F | WEIGHT: 136.2 LBS

## 2019-03-13 DIAGNOSIS — N39.45 CONTINUOUS LEAKAGE OF URINE: Primary | ICD-10-CM

## 2019-03-13 LAB
ALBUMIN UR-MCNC: NEGATIVE MG/DL
APPEARANCE UR: CLEAR
BACTERIA #/AREA URNS HPF: ABNORMAL /HPF
BILIRUB UR QL STRIP: NEGATIVE
COLOR UR AUTO: YELLOW
GLUCOSE UR STRIP-MCNC: NEGATIVE MG/DL
HGB UR QL STRIP: ABNORMAL
KETONES UR STRIP-MCNC: NEGATIVE MG/DL
LEUKOCYTE ESTERASE UR QL STRIP: ABNORMAL
NITRATE UR QL: NEGATIVE
NON-SQ EPI CELLS #/AREA URNS LPF: ABNORMAL /LPF
PH UR STRIP: 7 PH (ref 5–7)
RBC #/AREA URNS AUTO: ABNORMAL /HPF
SOURCE: ABNORMAL
SP GR UR STRIP: <=1.005 (ref 1–1.03)
UROBILINOGEN UR STRIP-ACNC: 0.2 EU/DL (ref 0.2–1)
WBC #/AREA URNS AUTO: ABNORMAL /HPF

## 2019-03-13 PROCEDURE — 99213 OFFICE O/P EST LOW 20 MIN: CPT | Mod: 24 | Performed by: NURSE PRACTITIONER

## 2019-03-13 PROCEDURE — 81001 URINALYSIS AUTO W/SCOPE: CPT | Performed by: NURSE PRACTITIONER

## 2019-03-13 PROCEDURE — 87086 URINE CULTURE/COLONY COUNT: CPT | Performed by: NURSE PRACTITIONER

## 2019-03-13 ASSESSMENT — MIFFLIN-ST. JEOR: SCORE: 1323.68

## 2019-03-13 ASSESSMENT — PAIN SCALES - GENERAL: PAINLEVEL: NO PAIN (0)
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
## 2019-03-14 LAB
BACTERIA SPEC CULT: NORMAL
SPECIMEN SOURCE: NORMAL

## 2019-03-15 ENCOUNTER — TELEPHONE (OUTPATIENT)
Dept: OBGYN | Facility: CLINIC | Age: 34
End: 2019-03-15

## 2019-03-15 NOTE — TELEPHONE ENCOUNTER
Patient is calling stating was seen this week for poss UTI, is having more frequency in urinating and discomfort in pelvic area. Please call to discuss, patient would like a RX. Thank you.

## 2019-03-15 NOTE — TELEPHONE ENCOUNTER
"UA/UC on 3/13 were negative for infection.  Pt was referred to PT for incontinence on 3/13/19.    Spoke with pt and she states she is experiencing urinary urgency and some pain \"deep inside\" when she urinates.  I explained to her that her UA/UC was negative 2 days ago.  I suggested that it may be due to her recent pregnancy and delivery as her body is healing.   I advised to drink lots of water and try Tylenol for pain.  I advised to be seen again on Monday if symptoms worsen.    Pt verbalized understanding and agreed to plan.    Claire Dumont RN      "

## 2019-03-17 ENCOUNTER — MYC MEDICAL ADVICE (OUTPATIENT)
Dept: OBGYN | Facility: CLINIC | Age: 34
End: 2019-03-17

## 2019-03-17 ENCOUNTER — OFFICE VISIT (OUTPATIENT)
Dept: URGENT CARE | Facility: URGENT CARE | Age: 34
End: 2019-03-17
Payer: COMMERCIAL

## 2019-03-17 VITALS
TEMPERATURE: 98.5 F | OXYGEN SATURATION: 100 % | BODY MASS INDEX: 22.13 KG/M2 | SYSTOLIC BLOOD PRESSURE: 152 MMHG | WEIGHT: 133 LBS | HEART RATE: 83 BPM | DIASTOLIC BLOOD PRESSURE: 79 MMHG

## 2019-03-17 DIAGNOSIS — R82.90 NONSPECIFIC FINDING ON EXAMINATION OF URINE: ICD-10-CM

## 2019-03-17 DIAGNOSIS — N30.00 ACUTE CYSTITIS WITHOUT HEMATURIA: ICD-10-CM

## 2019-03-17 LAB
ALBUMIN UR-MCNC: 30 MG/DL
APPEARANCE UR: CLEAR
BACTERIA #/AREA URNS HPF: ABNORMAL /HPF
BILIRUB UR QL STRIP: NEGATIVE
COLOR UR AUTO: YELLOW
GLUCOSE UR STRIP-MCNC: NEGATIVE MG/DL
HGB UR QL STRIP: ABNORMAL
KETONES UR STRIP-MCNC: NEGATIVE MG/DL
LEUKOCYTE ESTERASE UR QL STRIP: ABNORMAL
NITRATE UR QL: POSITIVE
NON-SQ EPI CELLS #/AREA URNS LPF: ABNORMAL /LPF
PH UR STRIP: 6.5 PH (ref 5–7)
RBC #/AREA URNS AUTO: ABNORMAL /HPF
SOURCE: ABNORMAL
SP GR UR STRIP: 1.01 (ref 1–1.03)
UROBILINOGEN UR STRIP-ACNC: 0.2 EU/DL (ref 0.2–1)
WBC #/AREA URNS AUTO: ABNORMAL /HPF

## 2019-03-17 PROCEDURE — 99213 OFFICE O/P EST LOW 20 MIN: CPT | Performed by: NURSE PRACTITIONER

## 2019-03-17 PROCEDURE — 87086 URINE CULTURE/COLONY COUNT: CPT | Performed by: NURSE PRACTITIONER

## 2019-03-17 PROCEDURE — 81001 URINALYSIS AUTO W/SCOPE: CPT | Performed by: NURSE PRACTITIONER

## 2019-03-17 PROCEDURE — 87186 SC STD MICRODIL/AGAR DIL: CPT | Performed by: NURSE PRACTITIONER

## 2019-03-17 PROCEDURE — 87088 URINE BACTERIA CULTURE: CPT | Performed by: NURSE PRACTITIONER

## 2019-03-17 RX ORDER — CEPHALEXIN 500 MG/1
500 CAPSULE ORAL 3 TIMES DAILY
Qty: 15 CAPSULE | Refills: 0 | Status: SHIPPED | OUTPATIENT
Start: 2019-03-17 | End: 2019-04-10

## 2019-03-17 RX ORDER — PHENAZOPYRIDINE HYDROCHLORIDE 200 MG/1
200 TABLET, FILM COATED ORAL 3 TIMES DAILY PRN
Qty: 6 TABLET | Refills: 0 | Status: SHIPPED | OUTPATIENT
Start: 2019-03-17 | End: 2019-03-20

## 2019-03-17 ASSESSMENT — ENCOUNTER SYMPTOMS
CARDIOVASCULAR NEGATIVE: 1
RESPIRATORY NEGATIVE: 1
NEUROLOGICAL NEGATIVE: 1
GASTROINTESTINAL NEGATIVE: 1
FLANK PAIN: 0
FEVER: 0
DYSURIA: 1
CHILLS: 0
FREQUENCY: 1

## 2019-03-17 NOTE — PROGRESS NOTES
HPI  Mavis Krishnamurthy 34 year old presents with dysuria, lower abdominal pain, extreme urgency and some incontinence. She is one week post-partum with a routine vaginal deliver and feels she is progressing well. Minimal bleeding and no concerns. This is her third child. She was evaluated for UTI a couple days ago and tests were positive for leukocyte esterase and blood but negative for nitrate. UC was mixed kym.  Symptoms have worsened significantly since that visit.     Past Medical History:   Diagnosis Date     Abnormal Pap smear     resolved, colposcopy     Abnormal Pap smears     Mild dysplasia-2007     AR (allergic rhinitis)       Patient Active Problem List   Diagnosis     CARDIOVASCULAR SCREENING; LDL GOAL LESS THAN 160     Allergic rhinitis     IBS (irritable bowel syndrome)     Chronic maxillary sinusitis     Lactose intolerance     Migraine     Abnormal antinuclear antibody titer     Bicuspid aortic valve     Supervision of other normal pregnancy, antepartum     Past Surgical History:   Procedure Laterality Date     COLONOSCOPY WITH CO2 INSUFFLATION N/A 6/18/2015    Procedure: COLONOSCOPY WITH CO2 INSUFFLATION;  Surgeon: Angel Thomas MD;  Location:  OR     ENDOSCOPIC BALLOON SINUPLASTY, OPTICAL TRACKING SYSTEM ENDOSCOPIC SINUS SURGERY, COMBINED  11/7/2013    Procedure: COMBINED ENDOSCOPIC BALLOON SINUPLASTY, OPTICAL TRACKING SYSTEM ENDOSCOPIC SINUS SURGERY;  Bilateral Endoscopic Ethmoidectomy, Maxillary Antrostomy, Frontal Sinusototmy with Navigation and Balloon and Propel Implants;  Surgeon: Vasquez Corona MD;  Location:  OR     ENDOSCOPY  10/2017    Of throat, burned part off, [place] [facility]     EXAM OF VAGINA,COLPOSCOPY      X -2     MOUTH SURGERY  2009    [place] [facility]      PHOTOREFRACTIVE KERATECTOMY      Oct and Dec 2015     UPPER GI ENDOSCOPY       Allergies   Allergen Reactions     Mold      Seasonal Allergies      Current Outpatient Medications   Medication     cephALEXin (KEFLEX)  500 MG capsule     phenazopyridine (PYRIDIUM) 200 MG tablet     Prenatal Vit-Fe Fumarate-FA (PRENATAL VITAMIN PO)     Probiotic Product (PROBIOTIC DAILY PO)     No current facility-administered medications for this visit.          Review of Systems   Constitutional: Negative for chills and fever.   Respiratory: Negative.    Cardiovascular: Negative.    Gastrointestinal: Negative.    Genitourinary: Positive for dysuria, frequency and urgency. Negative for flank pain.        Suprapubic pain.    Neurological: Negative.    Endo/Heme/Allergies:        Patient is attempting to breast feed but is supplementing due to low milk supply.         Physical Exam   Constitutional: She is oriented to person, place, and time. She appears well-developed and well-nourished. No distress.   /79   Pulse 83   Temp 98.5  F (36.9  C) (Oral)   Wt 60.3 kg (133 lb)   LMP 06/08/2018 (Exact Date)   SpO2 100%   BMI 22.13 kg/m       HENT:   Head: Normocephalic.   Cardiovascular: Normal rate.   Pulmonary/Chest: Effort normal.   Abdominal: Soft. There is tenderness in the suprapubic area. There is no CVA tenderness.   Genitourinary:   Genitourinary Comments: Light post-partum flow, no cramping.    Neurological: She is alert and oriented to person, place, and time.   Skin: Skin is warm and dry.   Nursing note and vitals reviewed.    Results for orders placed or performed in visit on 03/17/19   UA reflex to Microscopic and Culture   Result Value Ref Range    Color Urine Yellow     Appearance Urine Clear     Glucose Urine Negative NEG^Negative mg/dL    Bilirubin Urine Negative NEG^Negative    Ketones Urine Negative NEG^Negative mg/dL    Specific Gravity Urine 1.010 1.003 - 1.035    Blood Urine Large (A) NEG^Negative    pH Urine 6.5 5.0 - 7.0 pH    Protein Albumin Urine 30 (A) NEG^Negative mg/dL    Urobilinogen Urine 0.2 0.2 - 1.0 EU/dL    Nitrite Urine Positive (A) NEG^Negative    Leukocyte Esterase Urine Moderate (A) NEG^Negative    Source  Midstream Urine    Urine Microscopic   Result Value Ref Range    WBC Urine 25-50 (A) OTO5^0 - 5 /HPF    RBC Urine 10-25 (A) OTO2^O - 2 /HPF    Squamous Epithelial /LPF Urine Moderate (A) FEW^Few /LPF    Bacteria Urine Moderate (A) NEG^Negative /HPF     Assessment:  1. Urinary incontinence    2. Nonspecific finding on examination of urine    3. Acute cystitis without hematuria        Plan:  Orders Placed This Encounter     UA reflex to Microscopic and Culture     Urine Microscopic     phenazopyridine (PYRIDIUM) 200 MG tablet     cephALEXin (KEFLEX) 500 MG capsule   Tylenol 1-2 tabs po q4h prn pain  Instructions regarding self-care of patient with UTI reviewed.   Written instructions provided in after visit summary and reviewed.  Patient instructed to see primary care provider for new or persistent symptoms.   Red flag symptoms reviewed and patient has been instructed to seek emergent   Care at the closest emergency room for evaluation and treatment.   Please contact pharmacy for medication questions.  Patient instructed to take medications as directed on package.      Elva Bedoya, APRN, CNP

## 2019-03-17 NOTE — Clinical Note
Bala Yeboah, Mrs. Krishnamurthy presented Sunday for UTI symptoms and was pretty uncomfortable. The UA was positive for both leukocyte esterase and nitrates at this time. She was started on keflex and pyridium for her infection. She is scheduled to follow up with you tomorrow. Elva Bedoya, APRN, CNP

## 2019-03-17 NOTE — PATIENT INSTRUCTIONS
"  Patient Education     Bladder Infection, Female (Adult)    Urine is normally doesn't have any bacteria in it. But bacteria can get into the urinary tract from the skin around the rectum. Or they can travel in the blood from elsewhere in the body. Once they are in your urinary tract, they can cause infection in the urethra (urethritis), the bladder (cystitis), or the kidneys (pyelonephritis).  The most common place for an infection is in the bladder. This is called a bladder infection. This is one of the most common infections in women. Most bladder infections are easily treated. They are not serious unless the infection spreads to the kidney.  The phrases \"bladder infection,\" \"UTI,\" and \"cystitis\" are often used to describe the same thing. But they are not always the same. Cystitis is an inflammation of the bladder. The most common cause of cystitis is an infection.  Symptoms  The infection causes inflammation in the urethra and bladder. This causes many of the symptoms. The most common symptoms of a bladder infection are:    Pain or burning when urinating    Having to urinate more often than usual    Urgent need to urinate    Only a small amount of urine comes out    Blood in urine    Abdominal discomfort. This is usually in the lower abdomen above the pubic bone.    Cloudy urine    Strong- or bad-smelling urine    Unable to urinate (urinary retention)    Unable to hold urine in (urinary incontinence)    Fever    Loss of appetite    Confusion (in older adults)  Causes  Bladder infections are not contagious. You can't get one from someone else, from a toilet seat, or from sharing a bath.  The most common cause of bladder infections is bacteria from the bowels. The bacteria get onto the skin around the opening of the urethra. From there, they can get into the urine and travel up to the bladder, causing inflammation and infection. This usually happens because of:    Wiping improperly after urinating. Always wipe " from front to back.    Bowel incontinence    Pregnancy    Procedures such as having a catheter inserted    Older age    Not emptying your bladder. This can allow bacteria a chance to grow in your urine.    Dehydration    Constipation    Sex    Use of a diaphragm for birth control   Treatment  Bladder infections are diagnosed by a urine test. They are treated with antibiotics and usually clear up quickly without complications. Treatment helps prevent a more serious kidney infection.  Medicines  Medicines can help in the treatment of a bladder infection:    Take antibiotics until they are used up, even if you feel better. It is important to finish them to make sure the infection has cleared.    You can use acetaminophen or ibuprofen for pain, fever, or discomfort, unless another medicine was prescribed. If you have chronic liver or kidney disease, talk with your healthcare provider before using these medicines. Also talk with your provider if you've ever had a stomach ulcer or gastrointestinal bleeding, or are taking blood-thinner medicines.    If you are given phenazopydridine to reduce burning with urination, it will cause your urine to become a bright orange color. This can stain clothing.  Care and prevention  These self-care steps can help prevent future infections:    Drink plenty of fluids to prevent dehydration and flush out your bladder. Do this unless you must restrict fluids for other health reasons, or your doctor told you not to.    Proper cleaning after going to the bathroom is important. Wipe from front to back after using the toilet to prevent the spread of bacteria.    Urinate more often. Don't try to hold urine in for a long time.    Wear loose-fitting clothes and cotton underwear. Avoid tight-fitting pants.    Improve your diet and prevent constipation. Eat more fresh fruit and vegetables, and fiber, and less junk and fatty foods.    Avoid sex until your symptoms are gone.    Avoid caffeine,  alcohol, and spicy foods. These can irritate your bladder.    Urinate right after intercourse to flush out your bladder.    If you use birth control pills and have frequent bladder infections, discuss it with your doctor.  Follow-up care  Call your healthcare provider if all symptoms are not gone after 3 days of treatment. This is especially important if you have repeat infections.  If a culture was done, you will be told if your treatment needs to be changed. If directed, you can call to find out the results.  If X-rays were done, you will be told if the results will affect your treatment.  Call 911  Call 911 if any of the following occur:    Trouble breathing    Hard to wake up or confusion    Fainting or loss of consciousness    Rapid heart rate  When to seek medical advice  Call your healthcare provider right away if any of these occur:    Fever of 100.4 F (38.0 C) or higher, or as directed by your healthcare provider    Symptoms are not better by the third day of treatment    Back or belly (abdominal) pain that gets worse    Repeated vomiting, or unable to keep medicine down    Weakness or dizziness    Vaginal discharge    Pain, redness, or swelling in the outer vaginal area (labia)  Date Last Reviewed: 10/1/2016    4729-3743 [employer]. [address] [place] [address], [place] [address]. All rights reserved. This information is not intended as a substitute for professional medical care. Always follow your healthcare professional's instructions.

## 2019-03-18 NOTE — TELEPHONE ENCOUNTER
[employer] message routed to provider for review and response. Please advise.      Meg Connor CMA  8:23 AM  March 18, 2019

## 2019-03-19 LAB
BACTERIA SPEC CULT: ABNORMAL
SPECIMEN SOURCE: ABNORMAL

## 2019-03-20 ENCOUNTER — OFFICE VISIT (OUTPATIENT)
Dept: OBGYN | Facility: CLINIC | Age: 34
End: 2019-03-20
Payer: COMMERCIAL

## 2019-03-20 VITALS
OXYGEN SATURATION: 99 % | HEIGHT: 65 IN | WEIGHT: 132.6 LBS | BODY MASS INDEX: 22.09 KG/M2 | TEMPERATURE: 97.9 F | DIASTOLIC BLOOD PRESSURE: 80 MMHG | SYSTOLIC BLOOD PRESSURE: 125 MMHG | HEART RATE: 77 BPM

## 2019-03-20 DIAGNOSIS — N93.9 VAGINAL BLEEDING: Primary | ICD-10-CM

## 2019-03-20 DIAGNOSIS — N30.00 ACUTE CYSTITIS WITHOUT HEMATURIA: ICD-10-CM

## 2019-03-20 PROCEDURE — 99213 OFFICE O/P EST LOW 20 MIN: CPT | Performed by: NURSE PRACTITIONER

## 2019-03-20 RX ORDER — PHENAZOPYRIDINE HYDROCHLORIDE 200 MG/1
200 TABLET, FILM COATED ORAL 3 TIMES DAILY PRN
Qty: 6 TABLET | Refills: 0 | Status: SHIPPED | OUTPATIENT
Start: 2019-03-20 | End: 2019-04-10

## 2019-03-20 RX ORDER — NITROFURANTOIN 25; 75 MG/1; MG/1
100 CAPSULE ORAL 2 TIMES DAILY
Qty: 14 CAPSULE | Refills: 0 | Status: SHIPPED | OUTPATIENT
Start: 2019-03-20 | End: 2019-04-10

## 2019-03-20 ASSESSMENT — MIFFLIN-ST. JEOR: SCORE: 1307.35

## 2019-03-20 ASSESSMENT — PAIN SCALES - GENERAL: PAINLEVEL: MODERATE PAIN (5)

## 2019-03-20 NOTE — PROGRESS NOTES
SUBJECTIVE:   Mavis Krishnamurthy is a 34 year old female who presents to clinic today for the following health issues:      Follow up acute cystitis without hematuria /urinary incontinence        3/8/19. Was seen 3/13/19 with concerns of incontinence. Urine culture at that visit was negative. Patient does have an appointment scheduled with physical therapy. Seen again 3/17/19 in urgent care as she developed urinary symptoms and was diagnosed with a urinary tract infection, started on Keflex. Culture came back showing intermediate sensitivity, UC provider instructed patient to call if symptoms persisted and alternate medication would be sent. Patient has been taking Pyridium and that helps, but as soon as that wears off, experiencing dysuria, frequency, urgency. Mild low right back pain now as well. Feels warm/cold on and off, but unsure if that is hormonal.  Her incontinence has drastically improved already, is planning to meet with physical therapy still.    Also, has had minimal bleeding since delivery and recently passed 2 clots, one was about large walnut sized and the other was smaller. Prior to both, had a slight increase in bleeding and cramping, decreased after passing clot. Denies pelvic pain/cramping at this time.   Staying hydrated. Is breast feeding and also supplementing formula as baby was losing too much weight. At this time, wants the bladder symptoms gone and wants whichever antibiotic will help-will pump/dump and supplement if needed at this time. Denies fevers, nausea, vaginal discharge, itching, odor.     Problem list and histories reviewed & adjusted, as indicated.  Additional history: as documented    Patient Active Problem List   Diagnosis     CARDIOVASCULAR SCREENING; LDL GOAL LESS THAN 160     Allergic rhinitis     IBS (irritable bowel syndrome)     Chronic maxillary sinusitis     Lactose intolerance     Migraine     Abnormal antinuclear antibody titer     Bicuspid aortic valve      "Supervision of other normal pregnancy, antepartum     Past Surgical History:   Procedure Laterality Date     COLONOSCOPY WITH CO2 INSUFFLATION N/A 6/18/2015    Procedure: COLONOSCOPY WITH CO2 INSUFFLATION;  Surgeon: Angel Thomas MD;  Location: [facility] OR     ENDOSCOPIC BALLOON SINUPLASTY, OPTICAL TRACKING SYSTEM ENDOSCOPIC SINUS SURGERY, COMBINED  11/7/2013    Procedure: COMBINED ENDOSCOPIC BALLOON SINUPLASTY, OPTICAL TRACKING SYSTEM ENDOSCOPIC SINUS SURGERY;  Bilateral Endoscopic Ethmoidectomy, Maxillary Antrostomy, Frontal Sinusototmy with Navigation and Balloon and Propel Implants;  Surgeon: Vasquez Corona MD;  Location: [facility] OR     ENDOSCOPY  10/2017    Of throat, burned part off, [facility]     EXAM OF VAGINA,COLPOSCOPY      X -2     MOUTH SURGERY  2009    [facility]      PHOTOREFRACTIVE KERATECTOMY      Oct and Dec 2015     UPPER GI ENDOSCOPY         Social History     Tobacco Use     Smoking status: Never Smoker     Smokeless tobacco: Never Used   Substance Use Topics     Alcohol use: No     Family History   Problem Relation Age of Onset     Hypertension Mother      Heart Disease Father         open heart for endocarditis     Neurologic Disorder Father 16        migraines     Alzheimer Disease Maternal Grandmother 80     Neurologic Disorder Paternal Grandmother         migraines unknown age     Neurologic Disorder Sister 20        migraines           Reviewed and updated as needed this visit by clinical staff       Reviewed and updated as needed this visit by Provider         ROS:  Constitutional, HEENT, cardiovascular, pulmonary, gi and gu systems are negative, except as otherwise noted.    OBJECTIVE:     /80 (BP Location: Right arm, Patient Position: Sitting, Cuff Size: Adult Regular)   Pulse 77   Temp 97.9  F (36.6  C) (Oral)   Ht 1.651 m (5' 5\")   Wt 60.1 kg (132 lb 9.6 oz)   LMP 06/08/2018 (Exact Date)   SpO2 99%   Breastfeeding? Yes   BMI 22.07 kg/m    Body mass index is " 22.07 kg/m .  GENERAL: healthy, alert and no distress  RESP: lungs clear to auscultation - no rales, rhonchi or wheezes  CV: regular rate and rhythm, normal S1 S2, no S3 or S4, no murmur, click or rub, no peripheral edema and peripheral pulses strong  ABDOMEN: soft, tenderness in suprapubic area and bowel sounds normal  MS: no gross musculoskeletal defects noted, no edema  BACK: no CVA tenderness, no paralumbar tenderness  LYMPH: no cervical, supraclavicular, axillary, or inguinal adenopathy    ASSESSMENT/PLAN:   1. Acute cystitis without hematuria  We discussed her symptoms and reviewed her culture results. Will change to Macrobid BID x 7 days. We discussed medication use with breast feeding and recommendations for management. Will also refill Pyridium. Patient advised if no improvement in 48 hours, needs follow up. Push fluids. Plan repeat culture in 10 days to ensure resolution of infection.   - nitroFURantoin macrocrystal-monohydrate (MACROBID) 100 MG capsule; Take 1 capsule (100 mg) by mouth 2 times daily  Dispense: 14 capsule; Refill: 0  - phenazopyridine (PYRIDIUM) 200 MG tablet; Take 1 tablet (200 mg) by mouth 3 times daily as needed for irritation  Dispense: 6 tablet; Refill: 0  - Urine Culture Aerobic Bacterial; Future    2. Vaginal bleeding  We discussed her bleeding and clots, has not happened again and bleeding is minimal. If she starts passing large clots again and/or has heavy bleeding, will schedule pelvic ultrasound to ensure no retained placenta or other abnormality. Warning signs to monitor for and report immediately discussed with patient and she verbalizes understanding.  - US Pelvic Complete with Transvaginal; Future    AFSHAN Conteh [facility]

## 2019-03-20 NOTE — TELEPHONE ENCOUNTER
Called out again and representative was not available. No call has been returned will close encounter at this time    Krista Canchola  Women's Health           Complex Repair And Single Advancement Flap Text: The defect edges were debeveled with a #15 scalpel blade.  The primary defect was closed partially with a complex linear closure.  Given the location of the remaining defect, shape of the defect and the proximity to free margins a single advancement flap was deemed most appropriate for complete closure of the defect.  Using a sterile surgical marker, an appropriate advancement flap was drawn incorporating the defect and placing the expected incisions within the relaxed skin tension lines where possible.    The area thus outlined was incised deep to adipose tissue with a #15 scalpel blade.  The skin margins were undermined to an appropriate distance in all directions utilizing iris scissors.

## 2019-03-25 ENCOUNTER — MYC MEDICAL ADVICE (OUTPATIENT)
Dept: OBGYN | Facility: CLINIC | Age: 34
End: 2019-03-25

## 2019-03-25 NOTE — TELEPHONE ENCOUNTER
Pt was seen on 3/20/19.  She had been taking Keflex for a UTI on 3/17 but was switched to Macrobid on 3/20/19.    Per 3/20/19 OV plan:  ASSESSMENT/PLAN:   1. Acute cystitis without hematuria  We discussed her symptoms and reviewed her culture results. Will change to Macrobid BID x 7 days. We discussed medication use with breast feeding and recommendations for management. Will also refill Pyridium. Patient advised if no improvement in 48 hours, needs follow up. Push fluids. Plan repeat culture in 10 days to ensure resolution of infection.

## 2019-04-03 ENCOUNTER — THERAPY VISIT (OUTPATIENT)
Dept: PHYSICAL THERAPY | Facility: CLINIC | Age: 34
End: 2019-04-03
Payer: COMMERCIAL

## 2019-04-03 DIAGNOSIS — N39.3 FEMALE STRESS INCONTINENCE: ICD-10-CM

## 2019-04-03 DIAGNOSIS — M62.89 PELVIC FLOOR DYSFUNCTION IN FEMALE: ICD-10-CM

## 2019-04-03 DIAGNOSIS — N39.45 CONTINUOUS LEAKAGE OF URINE: ICD-10-CM

## 2019-04-03 DIAGNOSIS — K59.00 CONSTIPATION: ICD-10-CM

## 2019-04-03 PROCEDURE — 97161 PT EVAL LOW COMPLEX 20 MIN: CPT | Mod: GP | Performed by: PHYSICAL THERAPIST

## 2019-04-03 PROCEDURE — 97535 SELF CARE MNGMENT TRAINING: CPT | Mod: GP | Performed by: PHYSICAL THERAPIST

## 2019-04-03 PROCEDURE — 97112 NEUROMUSCULAR REEDUCATION: CPT | Mod: GP | Performed by: PHYSICAL THERAPIST

## 2019-04-03 NOTE — PROGRESS NOTES
"[facility] Initial Evaluation  Subjective:  The history is provided by the patient. No  was used.   Mavis Krishnamurthy is a 34 year old female with a incontinence and pelvic dysfunction (constipation) condition.  Condition occurred with:  After pregnancy.  Condition occurred: for unknown reasons.  This is a chronic condition  Date of MD order for this episode was 3-13-19. Mavis states she is having urinary issues but she also has constipation issues. Several years ago she was seen at the [facility](for nausea) and was dx with pelvic floor dysfunction.   Mavis states right after she had her baby she had no bladder control. She went in to see the MD 3 days after the birth. 2 days later she was in urgent care with a severe bladder infection, she was put on 2 different meds. States the urinary leakage is now improved but she does have some urgency without pain and small leaks with stress(cough/sneezing)  Bladder-will leak with cough/sneeze, still wearing pad for post partum bleeding, she thinks she is still leaking but feels it may just be drops of urine. She does have some urgency where she feels she has go \"go more than I should\". Void times day about every 2 hrs, night voids goes when baby gets up, 1-2x, if he sleeps all night she will not wake up. Urine stream is normal-strong, does not have to push, continuous, no post void dribbling. States she also had incontinence after the second child, it resolved  Fluid intake-100 oz water(this is typical for her), 1 coffee in am, cranberry juice 8 oz. She is done nursing. The coffee does not help with having a BM(as stimulant)  Bowels-sometimes will use miralax or a stool softener, about every other night. States she has a BM about 1x/wk. No lower abdominal pain. Does have to strain. Type 1-2 on [place] stool chart. The constipation has been for about 10 years.  Fiber-does not keep track of fiber. Metamucil and fiber capsules were not " helpful. Uses whole grain breads, does eat oatmeal for BF and salad for lunch. Not a big carb person. Lots of fruits.   Has not had intercourse yet but did not have pain with prior to birth.   , all vaginal. States she did have a laceration with the first and third. The most recent is not bothering her. She did have epidurals with all 3.   Not currently exercising but usually walks or runs. .    Patient reports pain:  N/a.           Symptoms are exacerbated by laughing, sneezing and coughing (unsure what causes the constipation) and relieved by nothing.  Since onset symptoms are gradually improving.        General health as reported by patient is good.  Pertinent medical history includes:  Heart problems, migraines/headaches and other (defect bicuspid valve).  Medical allergies: none.  Other surgeries include:  Other (sinus).  Current medications:  Other (vitamins and for acid reflux).  Current occupation is .  Employment status: maternity leave.  Primary job tasks include:  Prolonged sitting and other (computer).    Barriers include:  None as reported by the patient.    Red flags:  None as reported by the patient.                        Objective:  System                                 Pelvic Dysfunction Evaluation:        Flexibility:    Tightness present at:Hamstrings  Tightness not present at:  Adductors; Iliopsoas; Piriformis or Gluteals    Abdominal Wall:  Abdominal wall pelvic: hypomobilty of lower abdominal tissue moving superior, pain to palpation lat and inf to umbilicus.  Diastasis Recti:  Normal      Pelvic Clock Exam:    Ischiocavernosis pain:  -  Bulbocavernosis pain:  -  Transverse Perineal:  -  Levator ANI:  -  Perineal Body:  -  SI Provocation:    Positive for: ASLR  Negative for:  SI Compression and Fabres        External Assessment:  External assessment pelvic: does get lift with urogenital triangle descent but upon internal exam not using pelvic diaphragm mm's.  Skin  Condition:  Normal      Tissue Symmetry:  Normal    Muscle Contraction/Perineal Mobility:  Substitution  Internal Assessment:  Internal assessment pelvic: more superficial mm's strong, deeper(OI) only slight activation with stretch of mm on L, on R slight activation with cues first(tapping and verbal)  Sensory Exam:  Normal      Accessory Muscle use-Gluteals:  X  Accessory Muscle use-Adductors:  X    SEMG Biofeedback:  Semg biofeedback pelvic: deferred until Mavis is able to activate deeper PFM.                  Additional History:  Delivery History:  Vaginal delivery and tearing  Number of Pregnancies: 3  Number of Live Births: 3  Caffeine Consumption:  1 coffee in am          Hip Evaluation    Hip Strength:      Extension:  Left: 3+/5  Pain:Right: 5/5    Pain:    Abduction:  Left: 4+/5     Pain:Right: 5/5    Pain:    Internal Rotation:  Left: 5/5    Pain:Right: 5/5   Pain:  External Rotation:  Left: 5/5   Pain:  Right: 4-/5   Pain:                           General     ROS    Assessment/Plan:    Patient is a 34 year old female with pelvic complaints.    Patient has the following significant findings with corresponding treatment plan.                Diagnosis 1:  Stress incontinence, pelvic floor dysfunction, constipation  Decreased ROM/flexibility - manual therapy, therapeutic exercise and home program  Decreased strength - therapeutic exercise, therapeutic activities and home program  Decreased proprioception - neuro re-education, therapeutic activities and home program  Impaired muscle performance - biofeedback, neuro re-education and home program  Decreased function - therapeutic activities and home program    Therapy Evaluation Codes:   1) History comprised of:   Personal factors that impact the plan of care:      Past/current experiences and Time since onset of symptoms.    Comorbidity factors that impact the plan of care are:      None.     Medications impacting care: None.  2) Examination of Body Systems  comprised of:   Body structures and functions that impact the plan of care:      Pelvis.   Activity limitations that impact the plan of care are:      Stress incontinence, Urgency and constipation.  3) Clinical presentation characteristics are:   Stable/Uncomplicated.  4) Decision-Making    Low complexity using standardized patient assessment instrument and/or measureable assessment of functional outcome.  Cumulative Therapy Evaluation is: Low complexity.    Previous and current functional limitations:  (See Goal Flow Sheet for this information)    Short term and Long term goals: (See Goal Flow Sheet for this information)     Communication ability:  Patient appears to be able to clearly communicate and understand verbal and written communication and follow directions correctly.  Treatment Explanation - The following has been discussed with the patient:   RX ordered/plan of care  Anticipated outcomes  Possible risks and side effects  This patient would benefit from PT intervention to resume normal activities.   Rehab potential is good.    Frequency:  2 X a month, once daily(reduced frequency due to busy home life with new baby)  Duration:  for 3 months  Discharge Plan:  Achieve all LTG.  Independent in home treatment program.  Reach maximal therapeutic benefit.    Please refer to the daily flowsheet for treatment today, total treatment time and time spent performing 1:1 timed codes.

## 2019-04-10 ENCOUNTER — TRANSFERRED RECORDS (OUTPATIENT)
Dept: HEALTH INFORMATION MANAGEMENT | Facility: CLINIC | Age: 34
End: 2019-04-10

## 2019-04-10 ENCOUNTER — OFFICE VISIT (OUTPATIENT)
Dept: OBGYN | Facility: CLINIC | Age: 34
End: 2019-04-10
Payer: COMMERCIAL

## 2019-04-10 VITALS
DIASTOLIC BLOOD PRESSURE: 73 MMHG | SYSTOLIC BLOOD PRESSURE: 111 MMHG | OXYGEN SATURATION: 99 % | WEIGHT: 133 LBS | HEART RATE: 72 BPM | HEIGHT: 65 IN | TEMPERATURE: 98.3 F | BODY MASS INDEX: 22.16 KG/M2

## 2019-04-10 DIAGNOSIS — N30.00 ACUTE CYSTITIS WITHOUT HEMATURIA: ICD-10-CM

## 2019-04-10 DIAGNOSIS — R30.0 DYSURIA: Primary | ICD-10-CM

## 2019-04-10 LAB
ALBUMIN UR-MCNC: NEGATIVE MG/DL
APPEARANCE UR: CLEAR
BACTERIA #/AREA URNS HPF: ABNORMAL /HPF
BILIRUB UR QL STRIP: NEGATIVE
COLOR UR AUTO: YELLOW
GLUCOSE UR STRIP-MCNC: NEGATIVE MG/DL
HGB UR QL STRIP: NEGATIVE
KETONES UR STRIP-MCNC: NEGATIVE MG/DL
LEUKOCYTE ESTERASE UR QL STRIP: ABNORMAL
MUCOUS THREADS #/AREA URNS LPF: PRESENT /LPF
NITRATE UR QL: NEGATIVE
NON-SQ EPI CELLS #/AREA URNS LPF: ABNORMAL /LPF
PH UR STRIP: 6 PH (ref 5–7)
RBC #/AREA URNS AUTO: ABNORMAL /HPF
SOURCE: ABNORMAL
SP GR UR STRIP: 1.02 (ref 1–1.03)
SPECIMEN SOURCE: NORMAL
UROBILINOGEN UR STRIP-ACNC: 0.2 EU/DL (ref 0.2–1)
WBC #/AREA URNS AUTO: ABNORMAL /HPF
WET PREP SPEC: NORMAL

## 2019-04-10 PROCEDURE — 87086 URINE CULTURE/COLONY COUNT: CPT | Performed by: NURSE PRACTITIONER

## 2019-04-10 PROCEDURE — 81001 URINALYSIS AUTO W/SCOPE: CPT | Performed by: NURSE PRACTITIONER

## 2019-04-10 PROCEDURE — 87088 URINE BACTERIA CULTURE: CPT | Performed by: NURSE PRACTITIONER

## 2019-04-10 PROCEDURE — 87186 SC STD MICRODIL/AGAR DIL: CPT | Performed by: NURSE PRACTITIONER

## 2019-04-10 PROCEDURE — 99213 OFFICE O/P EST LOW 20 MIN: CPT | Performed by: NURSE PRACTITIONER

## 2019-04-10 PROCEDURE — 87210 SMEAR WET MOUNT SALINE/INK: CPT | Performed by: NURSE PRACTITIONER

## 2019-04-10 RX ORDER — NITROFURANTOIN 25; 75 MG/1; MG/1
100 CAPSULE ORAL 2 TIMES DAILY
Qty: 14 CAPSULE | Refills: 0 | Status: SHIPPED | OUTPATIENT
Start: 2019-04-10 | End: 2019-04-23

## 2019-04-10 RX ORDER — PHENAZOPYRIDINE HYDROCHLORIDE 200 MG/1
200 TABLET, FILM COATED ORAL 3 TIMES DAILY PRN
Qty: 6 TABLET | Refills: 0 | Status: SHIPPED | OUTPATIENT
Start: 2019-04-10 | End: 2019-04-23

## 2019-04-10 ASSESSMENT — MIFFLIN-ST. JEOR: SCORE: 1309.16

## 2019-04-10 ASSESSMENT — PAIN SCALES - GENERAL: PAINLEVEL: MODERATE PAIN (5)

## 2019-04-10 NOTE — PROGRESS NOTES
SUBJECTIVE:   Mavis Krishnamurthy is a 34 year old female who presents to clinic today for the following   health issues:      URINARY TRACT SYMPTOMS  Onset: 03/25/2019    Description:   Painful urination (Dysuria): YES  Blood in urine (Hematuria): no   Delay in urine (Hesitency): no     Intensity: moderate    Progression of Symptoms:  worsening    Accompanying Signs & Symptoms:  Fever/chills: no   Flank pain YES  Nausea and vomiting: YES  Any vaginal symptoms: none  Abdominal/Pelvic Pain: no     History:   History of frequent UTI's: YES  History of kidney stones: no   Sexually Active: no   Possibility of pregnancy: No    Precipitating factors:   none    Therapies Tried and outcome: Azo- mild relief    Has been having urinary symptoms since her vaginal delivery 3/8/19. Initially seen due to incontinence. This has improved and she is seeing physical therapy for this. Then was seen in Urgent Care and diagnosed with a urinary tract infection. Started on treatment and then was changed due to sensitivities showing intermediate susceptibility and persisting symptoms. Completed course of antibiotics and felt good for a few days before symptoms recurred. Throughout the day, not having as much dysuria-but is drinking a lot more fluids. Has found that if she goes all night without voiding, it is extremely painful in the morning. Does not look concentrated or dark like she is dehydrated. Feels some mild pressure when she is up and moving. Counted and yesterday, voided 42 times, prompting her to make the appointment today. Denies hematuria, low back pain, pelvic pain. No abnormal vaginal symptoms. She stopped nursing completely about 1 week ago.  Denies fever, nausea, dizziness. AZO OTC helps, but not like it was. Took a home test today to check for a urinary tract infection and both were positive.  Has an appointment scheduled with Urology for follow up as we had previously discussed.    Additional history: as  documented    Reviewed  and updated as needed this visit by clinical staff         Reviewed and updated as needed this visit by Provider         Patient Active Problem List   Diagnosis     CARDIOVASCULAR SCREENING; LDL GOAL LESS THAN 160     Allergic rhinitis     IBS (irritable bowel syndrome)     Chronic maxillary sinusitis     Lactose intolerance     Migraine     Abnormal antinuclear antibody titer     Bicuspid aortic valve     Supervision of other normal pregnancy, antepartum     Female stress incontinence     Pelvic floor dysfunction in female     Constipation     Past Surgical History:   Procedure Laterality Date     COLONOSCOPY WITH CO2 INSUFFLATION N/A 6/18/2015    Procedure: COLONOSCOPY WITH CO2 INSUFFLATION;  Surgeon: Angel Thomas MD;  Location: [facility] OR     ENDOSCOPIC BALLOON SINUPLASTY, OPTICAL TRACKING SYSTEM ENDOSCOPIC SINUS SURGERY, COMBINED  11/7/2013    Procedure: COMBINED ENDOSCOPIC BALLOON SINUPLASTY, OPTICAL TRACKING SYSTEM ENDOSCOPIC SINUS SURGERY;  Bilateral Endoscopic Ethmoidectomy, Maxillary Antrostomy, Frontal Sinusototmy with Navigation and Balloon and Propel Implants;  Surgeon: Vasquez Corona MD;  Location:  OR     ENDOSCOPY  10/2017    Of throat, burned part off, [place] [facility]     EXAM OF VAGINA,COLPOSCOPY      X -2     MOUTH SURGERY  2009    [place] Teeth      PHOTOREFRACTIVE KERATECTOMY      Oct and Dec 2015     UPPER GI ENDOSCOPY         Social History     Tobacco Use     Smoking status: Never Smoker     Smokeless tobacco: Never Used   Substance Use Topics     Alcohol use: No     Family History   Problem Relation Age of Onset     Hypertension Mother      Heart Disease Father         open heart for endocarditis     Neurologic Disorder Father 16        migraines     Alzheimer Disease Maternal Grandmother 80     Neurologic Disorder Paternal Grandmother         migraines unknown age     Neurologic Disorder Sister 20        migraines           ROS:  Constitutional, HEENT,  "cardiovascular, pulmonary, gi and gu systems are negative, except as otherwise noted.    OBJECTIVE:     /73 (BP Location: Right arm, Patient Position: Sitting, Cuff Size: Adult Regular)   Pulse 72   Temp 98.3  F (36.8  C) (Oral)   Ht 1.651 m (5' 5\")   Wt 60.3 kg (133 lb)   LMP 06/08/2018 (Exact Date)   SpO2 99%   Breastfeeding? No   BMI 22.13 kg/m    Body mass index is 22.13 kg/m .  GENERAL: healthy, alert and no distress  ABDOMEN: soft, nontender, no hepatosplenomegaly, no masses and bowel sounds normal   (female): normal female external genitalia, normal urethral meatus, vaginal mucosa is pale and slightly atrophic-consistent with recent nursing, normal cervix/adnexa/uterus without masses or discharge  MS: no gross musculoskeletal defects noted, no edema  SKIN: no suspicious lesions or rashes  PSYCH: mentation appears normal, affect normal/bright    Diagnostic Test Results:  Results for orders placed or performed in visit on 04/10/19 (from the past 24 hour(s))   UA with Microscopic   Result Value Ref Range    Color Urine Yellow     Appearance Urine Clear     Glucose Urine Negative NEG^Negative mg/dL    Bilirubin Urine Negative NEG^Negative    Ketones Urine Negative NEG^Negative mg/dL    Specific Gravity Urine 1.020 1.003 - 1.035    pH Urine 6.0 5.0 - 7.0 pH    Protein Albumin Urine Negative NEG^Negative mg/dL    Urobilinogen Urine 0.2 0.2 - 1.0 EU/dL    Nitrite Urine Negative NEG^Negative    Blood Urine Negative NEG^Negative    Leukocyte Esterase Urine Moderate (A) NEG^Negative    Source Midstream Urine     WBC Urine 10-25 (A) OTO5^0 - 5 /HPF    RBC Urine O - 2 OTO2^O - 2 /HPF    Squamous Epithelial /LPF Urine Few FEW^Few /LPF    Bacteria Urine Few (A) NEG^Negative /HPF    Mucous Urine Present (A) NEG^Negative /LPF   Wet prep   Result Value Ref Range    Specimen Description Vagina     Wet Prep No yeast seen     Wet Prep No clue cells seen     Wet Prep No Trichomonas seen     Wet Prep Few  WBC'S " seen          ASSESSMENT/PLAN:   1. Dysuria  Await culture  - Urine Culture Aerobic Bacterial  - UA with Microscopic  - Wet prep    2. Acute cystitis without hematuria  We discussed possible etiologies of her symptoms. Discussed that with nursing, vaginal tissue is in a slightly hypo-estrogenic state, but this does not seem to be sole cause of her symptoms. Will start treatment with Macrobid BID and await culture. Patient to continue to push fluids. Will try to void at least 1-2 times during the night as well for now. Keep appointment on 4/22/19 with Urology. Patient is given an opportunity to ask questions and have them answered.  - nitroFURantoin macrocrystal-monohydrate (MACROBID) 100 MG capsule; Take 1 capsule (100 mg) by mouth 2 times daily  Dispense: 14 capsule; Refill: 0  - phenazopyridine (PYRIDIUM) 200 MG tablet; Take 1 tablet (200 mg) by mouth 3 times daily as needed for irritation  Dispense: 6 tablet; Refill: 0    AFSHAN [facility]

## 2019-04-12 ENCOUNTER — MYC MEDICAL ADVICE (OUTPATIENT)
Dept: OBGYN | Facility: CLINIC | Age: 34
End: 2019-04-12

## 2019-04-12 LAB
BACTERIA SPEC CULT: ABNORMAL
BACTERIA SPEC CULT: ABNORMAL
SPECIMEN SOURCE: ABNORMAL

## 2019-04-18 ENCOUNTER — TRANSFERRED RECORDS (OUTPATIENT)
Dept: HEALTH INFORMATION MANAGEMENT | Facility: CLINIC | Age: 34
End: 2019-04-18

## 2019-04-18 ENCOUNTER — OFFICE VISIT (OUTPATIENT)
Dept: FAMILY MEDICINE | Facility: CLINIC | Age: 34
End: 2019-04-18
Payer: COMMERCIAL

## 2019-04-18 VITALS
BODY MASS INDEX: 21.8 KG/M2 | WEIGHT: 131 LBS | OXYGEN SATURATION: 98 % | TEMPERATURE: 98.3 F | DIASTOLIC BLOOD PRESSURE: 77 MMHG | SYSTOLIC BLOOD PRESSURE: 120 MMHG | HEART RATE: 71 BPM | RESPIRATION RATE: 16 BRPM

## 2019-04-18 DIAGNOSIS — N39.0 RECURRENT UTI: Primary | ICD-10-CM

## 2019-04-18 LAB

## 2019-04-18 PROCEDURE — 87088 URINE BACTERIA CULTURE: CPT | Performed by: FAMILY MEDICINE

## 2019-04-18 PROCEDURE — 81001 URINALYSIS AUTO W/SCOPE: CPT | Performed by: FAMILY MEDICINE

## 2019-04-18 PROCEDURE — 87186 SC STD MICRODIL/AGAR DIL: CPT | Performed by: FAMILY MEDICINE

## 2019-04-18 PROCEDURE — 87086 URINE CULTURE/COLONY COUNT: CPT | Performed by: FAMILY MEDICINE

## 2019-04-18 PROCEDURE — 99214 OFFICE O/P EST MOD 30 MIN: CPT | Performed by: FAMILY MEDICINE

## 2019-04-18 RX ORDER — SULFAMETHOXAZOLE/TRIMETHOPRIM 800-160 MG
1 TABLET ORAL 2 TIMES DAILY
Qty: 20 TABLET | Refills: 0 | Status: SHIPPED | OUTPATIENT
Start: 2019-04-18 | End: 2019-04-23

## 2019-04-18 ASSESSMENT — PAIN SCALES - GENERAL: PAINLEVEL: MILD PAIN (3)

## 2019-04-18 NOTE — PATIENT INSTRUCTIONS
Patient Education     Patient Education    Sulfamethoxazole, Trimethoprim Oral suspension    Sulfamethoxazole, Trimethoprim Oral tablet    Sulfamethoxazole, Trimethoprim Solution for injection  Sulfamethoxazole, Trimethoprim Oral tablet  What is this medicine?  SULFAMETHOXAZOLE; TRIMETHOPRIM or SMX-TMP (suhl fuh meth OK schuyler zohl; trye METH oh prim) is a combination of a sulfonamide antibiotic and a second antibiotic, trimethoprim. It is used to treat or prevent certain kinds of bacterial infections. It will not work for colds, flu, or other viral infections.  This medicine may be used for other purposes; ask your health care provider or pharmacist if you have questions.  What should I tell my health care provider before I take this medicine?  They need to know if you have any of these conditions:    anemia    asthma    being treated with anticonvulsants    if you frequently drink alcohol containing drinks    kidney disease    liver disease    low level of folic acid or glucose-6-phosphate dehydrogenase    poor nutrition or malabsorption    porphyria    severe allergies    thyroid disorder    an unusual or allergic reaction to sulfamethoxazole, trimethoprim, sulfa drugs, other medicines, foods, dyes, or preservatives    pregnant or trying to get pregnant    breast-feeding  How should I use this medicine?  Take this medicine by mouth with a full glass of water. Follow the directions on the prescription label. Take your medicine at regular intervals. Do not take it more often than directed. Do not skip doses or stop your medicine early.  Talk to your pediatrician regarding the use of this medicine in children. Special care may be needed. This medicine has been used in children as young as 2 months of age.  Overdosage: If you think you have taken too much of this medicine contact a poison control center or emergency room at once.  NOTE: This medicine is only for you. Do not share this medicine with others.  What if I  miss a dose?  If you miss a dose, take it as soon as you can. If it is almost time for your next dose, take only that dose. Do not take double or extra doses.  What may interact with this medicine?  Do not take this medicine with any of the following medications:    aminobenzoate potassium    dofetilide    metronidazole  This medicine may also interact with the following medications:    ACE inhibitors like benazepril, enalapril, lisinopril, and ramipril    birth control pills    cyclosporine    digoxin    diuretics    indomethacin    medicines for diabetes    methenamine    methotrexate    phenytoin    potassium supplements    pyrimethamine    sulfinpyrazone    tricyclic antidepressants    warfarin  This list may not describe all possible interactions. Give your health care provider a list of all the medicines, herbs, non-prescription drugs, or dietary supplements you use. Also tell them if you smoke, drink alcohol, or use illegal drugs. Some items may interact with your medicine.  What should I watch for while using this medicine?  Tell your doctor or health care professional if your symptoms do not improve. Drink several glasses of water a day to reduce the risk of kidney problems.  Do not treat diarrhea with over the counter products. Contact your doctor if you have diarrhea that lasts more than 2 days or if it is severe and watery.  This medicine can make you more sensitive to the sun. Keep out of the sun. If you cannot avoid being in the sun, wear protective clothing and use a sunscreen. Do not use sun lamps or tanning beds/booths.  What side effects may I notice from receiving this medicine?  Side effects that you should report to your doctor or health care professional as soon as possible:    allergic reactions like skin rash or hives, swelling of the face, lips, or tongue    breathing problems    fever or chills, sore throat    irregular heartbeat, chest pain    joint or muscle pain    pain or difficulty  passing urine    red pinpoint spots on skin    redness, blistering, peeling or loosening of the skin, including inside the mouth    unusual bleeding or bruising    unusually weak or tired    yellowing of the eyes or skin  Side effects that usually do not require medical attention (report to your doctor or health care professional if they continue or are bothersome):    diarrhea    dizziness    headache    loss of appetite    nausea, vomiting    nervousness  This list may not describe all possible side effects. Call your doctor for medical advice about side effects. You may report side effects to FDA at 1-019-FDA-9375.  Where should I keep my medicine?  Keep out of the reach of children.  Store at room temperature between 20 to 25 degrees C (68 to 77 degrees F). Protect from light. Throw away any unused medicine after the expiration date.  NOTE:This sheet is a summary. It may not cover all possible information. If you have questions about this medicine, talk to your doctor, pharmacist, or health care provider. Copyright  2016 [employer] Standard

## 2019-04-18 NOTE — PROGRESS NOTES
SUBJECTIVE:   Mavis Krishnamurthy is a 34 year old female who presents to clinic today for the following   health issues:      Patient presents with:  RECHECK: recheck uti, pt is still having urgency and pain even after finishing antibiotic    Gave birth 6 weeks ago  Since coming back from the hospital has been having urinary symptoms  Has been treated at least 3 times - one time needed to be changed antibiotic     Urine cultures:  3/13 mixed kym  3/17- e.coli   4/10 - e.coli     8 days ago was seen by obgyn with urinary symptoms   Patient just finished macrobid at that time and started having urinary symptoms again  Urine culture did grow ecoli 4/10 patient was retreated with macrobid     5 days ago symptoms started coming back even while on the macrobid  2 days ago was the last day of the macrobid.    Has gotten worse since being off the macrobid.    Patient also doing PT for concern about leakage of urine     Patient has some nausea  Today had endoscopy and is on carafate for her throat discomfort.  Vaginal discharge or concerns: No  Fever chills: None  Nausea vomiting: None  Flank Pain: None  Patient denies possibility of pregnancy  Denies possibility of STD, declined STD testing.    Problem list and histories reviewed & adjusted, as indicated.  Additional history: as documented    Problem list, Medication list, Allergies, and Medical/Social/Surgical histories reviewed in [facility] and updated as appropriate.    ROS:  Constitutional, HEENT, cardiovascular, pulmonary, gi and gu systems are negative, except as otherwise noted.    OBJECTIVE:                                                    /77   Pulse 71   Temp 98.3  F (36.8  C) (Oral)   Resp 16   Wt 59.4 kg (131 lb)   LMP 06/08/2018 (Exact Date)   SpO2 98%   Breastfeeding? No   BMI 21.80 kg/m    Body mass index is 21.8 kg/m .  GENERAL: healthy, alert and no distress  NECK: no adenopathy, no asymmetry, masses, or scars and thyroid normal to  palpation  RESP: lungs clear to auscultation - no rales, rhonchi or wheezes  CV: regular rate and rhythm, normal S1 S2, no S3 or S4, no murmur, click or rub, no peripheral edema and peripheral pulses strong  ABDOMEN: soft, nontender, no hepatosplenomegaly, no masses and bowel sounds normal  MS: no gross musculoskeletal defects noted, no edema    Diagnostic Test Results:  Results for orders placed or performed in visit on 04/18/19 (from the past 24 hour(s))   *UA reflex to Microscopic and Culture ([facility] (except [place] and [place])   Result Value Ref Range    Color Urine Yellow     Appearance Urine Clear     Glucose Urine Negative NEG^Negative mg/dL    Bilirubin Urine Negative NEG^Negative    Ketones Urine Negative NEG^Negative mg/dL    Specific Gravity Urine 1.010 1.003 - 1.035    Blood Urine Negative NEG^Negative    pH Urine 6.0 5.0 - 7.0 pH    Protein Albumin Urine Negative NEG^Negative mg/dL    Urobilinogen Urine 0.2 0.2 - 1.0 EU/dL    Nitrite Urine Negative NEG^Negative    Leukocyte Esterase Urine Small (A) NEG^Negative    Source Midstream Urine    Urine Microscopic   Result Value Ref Range    WBC Urine 5-10 (A) OTO5^0 - 5 /HPF    RBC Urine O - 2 OTO2^O - 2 /HPF    Squamous Epithelial /LPF Urine Few FEW^Few /LPF    Bacteria Urine Few (A) NEG^Negative /HPF        ASSESSMENT/PLAN:                                                        ICD-10-CM    1. Recurrent UTI N39.0 *UA reflex to Microscopic and Culture ([place] and [facility] (except [place] and [place])     Urine Microscopic     Urine Culture Aerobic Bacterial     sulfamethoxazole-trimethoprim (BACTRIM DS) 800-160 MG tablet       See above  Multiple rounds of antibiotic without much relief  Prescribed with bactrim  Follow up with urology in a few days- please keep appointment  See AVS   Aware to go to ER or come in immediately if with any fever chills nausea vomiting or flank pain.  Adverse reactions of medications  discussed.  Over the counter medications discussed.   Aware to come back in if with worsening symptoms or if no relief despite treatment plan  Patient voiced understanding and had no further questions.     MD Heidy Todd MD  [facility]

## 2019-04-20 ENCOUNTER — NURSE TRIAGE (OUTPATIENT)
Dept: NURSING | Facility: CLINIC | Age: 34
End: 2019-04-20

## 2019-04-20 ENCOUNTER — MYC MEDICAL ADVICE (OUTPATIENT)
Dept: FAMILY MEDICINE | Facility: CLINIC | Age: 34
End: 2019-04-20

## 2019-04-20 LAB
BACTERIA SPEC CULT: ABNORMAL
SPECIMEN SOURCE: ABNORMAL

## 2019-04-20 NOTE — TELEPHONE ENCOUNTER
Mavis is calling with questions on antibiotic and when taking medication tongue is getting tingling for 3 hours after taking medication.  Bactrim.  Mavis is also having burning also.  Denies swelling.  FNA advised to phone pharmacy and if does not get better to p lilibeth back and we can contact primary clinic and Mavis agreed.

## 2019-04-22 ENCOUNTER — OFFICE VISIT (OUTPATIENT)
Dept: UROLOGY | Facility: CLINIC | Age: 34
End: 2019-04-22
Payer: COMMERCIAL

## 2019-04-22 VITALS
HEART RATE: 78 BPM | SYSTOLIC BLOOD PRESSURE: 115 MMHG | HEIGHT: 60 IN | BODY MASS INDEX: 25.58 KG/M2 | OXYGEN SATURATION: 98 % | DIASTOLIC BLOOD PRESSURE: 55 MMHG

## 2019-04-22 DIAGNOSIS — N39.3 FEMALE STRESS INCONTINENCE: Primary | ICD-10-CM

## 2019-04-22 DIAGNOSIS — N39.0 RECURRENT UTI: ICD-10-CM

## 2019-04-22 LAB
ALBUMIN UR-MCNC: NEGATIVE MG/DL
APPEARANCE UR: CLEAR
BILIRUB UR QL STRIP: NEGATIVE
COLOR UR AUTO: NORMAL
GLUCOSE UR STRIP-MCNC: NEGATIVE MG/DL
HGB UR QL STRIP: NEGATIVE
KETONES UR STRIP-MCNC: NEGATIVE MG/DL
LEUKOCYTE ESTERASE UR QL STRIP: NEGATIVE
NITRATE UR QL: NEGATIVE
PH UR STRIP: 6.5 PH (ref 5–7)
SOURCE: NORMAL
SP GR UR STRIP: 1 (ref 1–1.03)
UROBILINOGEN UR STRIP-MCNC: NORMAL MG/DL (ref 0–2)

## 2019-04-22 PROCEDURE — 99203 OFFICE O/P NEW LOW 30 MIN: CPT | Performed by: UROLOGY

## 2019-04-22 PROCEDURE — 81003 URINALYSIS AUTO W/O SCOPE: CPT | Performed by: UROLOGY

## 2019-04-22 RX ORDER — SUCRALFATE 1 G/10ML
SUSPENSION ORAL
Refills: 1 | COMMUNITY
Start: 2019-04-18 | End: 2019-05-07

## 2019-04-22 ASSESSMENT — PAIN SCALES - GENERAL: PAINLEVEL: MODERATE PAIN (4)

## 2019-04-22 NOTE — TELEPHONE ENCOUNTER
Hi,  I just read this message.  I am Same Day and [employer] messages/phone calls usually go to primary care provider and not to Same Day or Urgent Care provider.    Please call and verify that patient has been switched to a different med  If concerns about continued symptoms may need to be evaluated asap and recommend rule out severe allergic reaction.     If symptoms are improved, marking as potential allergy may be sufficient Looks like she has an appointment with urology. Will defer to urology what antibiotic to switch her to if they feel needed.    For additional questions please route to same day in clinic or primary care provider if time-sensitive reply needed.    Thank you    Heidy Hicks M.D.

## 2019-04-22 NOTE — TELEPHONE ENCOUNTER
Patient reported that she was seen by urology today.  They were ok with her going off of the Bactrim and they did give her a plan going forward.  No further support needed per patient.  Molly Dorantes R.N.

## 2019-04-22 NOTE — NURSING NOTE
Mavis Krishnamurthy's goals for this visit include:   Chief Complaint   Patient presents with     Consult     UTI       She requests these members of her care team be copied on today's visit information: yes    PCP: Leo Ceja    Referring Provider:  No referring provider defined for this encounter.    /55   Pulse 78   Ht 1.524 m (5')   LMP 06/08/2018 (Exact Date)   SpO2 98%   BMI 25.58 kg/m      Do you need any medication refills at today's visit? No    post void residual  103ml    AMENA Gupta
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
## 2019-04-23 ENCOUNTER — PRENATAL OFFICE VISIT (OUTPATIENT)
Dept: OBGYN | Facility: CLINIC | Age: 34
End: 2019-04-23
Payer: COMMERCIAL

## 2019-04-23 ENCOUNTER — ANCILLARY PROCEDURE (OUTPATIENT)
Dept: ULTRASOUND IMAGING | Facility: CLINIC | Age: 34
End: 2019-04-23
Attending: UROLOGY
Payer: COMMERCIAL

## 2019-04-23 VITALS
OXYGEN SATURATION: 100 % | HEIGHT: 65 IN | DIASTOLIC BLOOD PRESSURE: 80 MMHG | SYSTOLIC BLOOD PRESSURE: 114 MMHG | HEART RATE: 64 BPM | TEMPERATURE: 98.5 F | BODY MASS INDEX: 21.79 KG/M2 | WEIGHT: 130.8 LBS

## 2019-04-23 DIAGNOSIS — N39.0 RECURRENT UTI: ICD-10-CM

## 2019-04-23 PROCEDURE — 76770 US EXAM ABDO BACK WALL COMP: CPT

## 2019-04-23 PROCEDURE — 99207 ZZC POST PARTUM EXAM: CPT | Performed by: NURSE PRACTITIONER

## 2019-04-23 ASSESSMENT — PATIENT HEALTH QUESTIONNAIRE - PHQ9
SUM OF ALL RESPONSES TO PHQ QUESTIONS 1-9: 1
10. IF YOU CHECKED OFF ANY PROBLEMS, HOW DIFFICULT HAVE THESE PROBLEMS MADE IT FOR YOU TO DO YOUR WORK, TAKE CARE OF THINGS AT HOME, OR GET ALONG WITH OTHER PEOPLE: NOT DIFFICULT AT ALL
SUM OF ALL RESPONSES TO PHQ QUESTIONS 1-9: 1

## 2019-04-23 ASSESSMENT — PAIN SCALES - GENERAL: PAINLEVEL: NO PAIN (0)

## 2019-04-23 ASSESSMENT — MIFFLIN-ST. JEOR: SCORE: 1299.18
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
## 2019-04-24 ENCOUNTER — TRANSFERRED RECORDS (OUTPATIENT)
Dept: HEALTH INFORMATION MANAGEMENT | Facility: CLINIC | Age: 34
End: 2019-04-24

## 2019-04-24 ASSESSMENT — PATIENT HEALTH QUESTIONNAIRE - PHQ9: SUM OF ALL RESPONSES TO PHQ QUESTIONS 1-9: 1

## 2019-04-25 ENCOUNTER — MYC MEDICAL ADVICE (OUTPATIENT)
Dept: FAMILY MEDICINE | Facility: CLINIC | Age: 34
End: 2019-04-25

## 2019-04-25 DIAGNOSIS — K76.0 FATTY LIVER: Primary | ICD-10-CM

## 2019-04-25 NOTE — TELEPHONE ENCOUNTER
[employer] message sent to patient.    This RN put in fasting lab orders per providers note below.    Lexy Mccurdy RN, BSN

## 2019-05-01 DIAGNOSIS — K76.0 FATTY LIVER: ICD-10-CM

## 2019-05-01 LAB
ALBUMIN SERPL-MCNC: 4.1 G/DL (ref 3.4–5)
ALP SERPL-CCNC: 68 U/L (ref 40–150)
ALT SERPL W P-5'-P-CCNC: 26 U/L (ref 0–50)
AST SERPL W P-5'-P-CCNC: 16 U/L (ref 0–45)
BILIRUB DIRECT SERPL-MCNC: 0.2 MG/DL (ref 0–0.2)
BILIRUB SERPL-MCNC: 0.6 MG/DL (ref 0.2–1.3)
CHOLEST SERPL-MCNC: 143 MG/DL
HDLC SERPL-MCNC: 49 MG/DL
LDLC SERPL CALC-MCNC: 75 MG/DL
NONHDLC SERPL-MCNC: 94 MG/DL
PROT SERPL-MCNC: 7.7 G/DL (ref 6.8–8.8)
TRIGL SERPL-MCNC: 97 MG/DL

## 2019-05-01 PROCEDURE — 36415 COLL VENOUS BLD VENIPUNCTURE: CPT | Performed by: FAMILY MEDICINE

## 2019-05-01 PROCEDURE — 80076 HEPATIC FUNCTION PANEL: CPT | Performed by: FAMILY MEDICINE

## 2019-05-01 PROCEDURE — 80061 LIPID PANEL: CPT | Performed by: FAMILY MEDICINE

## 2019-05-07 ENCOUNTER — OFFICE VISIT (OUTPATIENT)
Dept: FAMILY MEDICINE | Facility: CLINIC | Age: 34
End: 2019-05-07
Payer: COMMERCIAL

## 2019-05-07 VITALS
DIASTOLIC BLOOD PRESSURE: 72 MMHG | RESPIRATION RATE: 16 BRPM | OXYGEN SATURATION: 100 % | HEIGHT: 65 IN | TEMPERATURE: 98.3 F | SYSTOLIC BLOOD PRESSURE: 123 MMHG | BODY MASS INDEX: 21.66 KG/M2 | WEIGHT: 130 LBS | HEART RATE: 72 BPM

## 2019-05-07 DIAGNOSIS — K76.0 FATTY LIVER: ICD-10-CM

## 2019-05-07 DIAGNOSIS — R30.0 DYSURIA: Primary | ICD-10-CM

## 2019-05-07 LAB
ALBUMIN UR-MCNC: NEGATIVE MG/DL
APPEARANCE UR: CLEAR
BILIRUB UR QL STRIP: NEGATIVE
COLOR UR AUTO: YELLOW
GLUCOSE UR STRIP-MCNC: NEGATIVE MG/DL
HGB UR QL STRIP: NEGATIVE
KETONES UR STRIP-MCNC: NEGATIVE MG/DL
LEUKOCYTE ESTERASE UR QL STRIP: NEGATIVE
NITRATE UR QL: NEGATIVE
PH UR STRIP: 6 PH (ref 5–7)
SOURCE: NORMAL
SP GR UR STRIP: <=1.005 (ref 1–1.03)
UROBILINOGEN UR STRIP-ACNC: 0.2 EU/DL (ref 0.2–1)

## 2019-05-07 PROCEDURE — 99214 OFFICE O/P EST MOD 30 MIN: CPT | Performed by: FAMILY MEDICINE

## 2019-05-07 PROCEDURE — 87086 URINE CULTURE/COLONY COUNT: CPT | Performed by: FAMILY MEDICINE

## 2019-05-07 PROCEDURE — 81003 URINALYSIS AUTO W/O SCOPE: CPT | Performed by: FAMILY MEDICINE

## 2019-05-07 ASSESSMENT — MIFFLIN-ST. JEOR: SCORE: 1295.56

## 2019-05-07 NOTE — PROGRESS NOTES
"SUBJECTIVE:  Mavis Krishnamurthy, a 34 year old female scheduled an appointment to discuss the following issues:   follow-up fatty liver per ultrasound per urologist.   History IBS/constipation and migraines.   Post-partum 3rd kid 3/8/19. reoccurent utis. Dysuria recently. No hematuria. No fevers or chills. No flank pain.   Normal glucose. Grandpa with high cholesterol. No family history mi/cva/dm.   Walking now.   Emotionally doing.   Water intake needs more. Caffeine - coffee 1-2 cups/day. No pop. No cranberry juice.  gerd worse despite zantac. Seen GI. Restarted prilosec. No dysphagia.   Not breast feeding.   Allergy to bactrim. No cipro.   Medical, social, surgical, and family histories reviewed.    ROS:  All other ROS negative.     OBJECTIVE:  /72   Pulse 72   Temp 98.3  F (36.8  C) (Oral)   Resp 16   Ht 1.651 m (5' 5\")   Wt 59 kg (130 lb)   SpO2 100%   BMI 21.63 kg/m    EXAM:  GENERAL APPEARANCE: healthy, alert and no distress  EYES: EOMI,  PERRL  HENT: ear canals and TM's normal and nose and mouth without ulcers or lesions  NECK: no adenopathy, no asymmetry, masses, or scars and thyroid normal to palpation  RESP: lungs clear to auscultation - no rales, rhonchi or wheezes  CV: regular rates and rhythm, normal S1 S2, no S3 or S4 and no murmur, click or rub -  ABDOMEN:  soft, nontender, no HSM or masses and bowel sounds normal  MS: extremities normal- no gross deformities noted, no evidence of inflammation in joints, FROM in all extremities.  PSYCH: mentation appears normal and affect normal/bright    ASSESSMENT / PLAN:  (R30.0) Dysuria  (primary encounter diagnosis)  Comment: possible repeat uti  Plan: UA reflex to Microscopic and Culture, Urine         Culture Aerobic Bacterial        Await cx. More water/cranberry juice. Follow-up per urology. cipro if worsening. Expected course and warning signs reviewed. Call/email with questions/concerns     (K76.0) Fatty liver  Comment: possible error or from " pregnancy? Normal symptoms and no regular ALCOHOL/lft's/lipids ok  Plan: continue exercise. Repeat in one year unless symptoms. Expected course and warning signs reviewed. Call/email with questions/concerns     Leo Ceja MD

## 2019-05-07 NOTE — NURSING NOTE
"Chief Complaint   Patient presents with     Lipids     Health Maintenance       Initial /72   Pulse 72   Temp 98.3  F (36.8  C) (Oral)   Resp 16   Ht 1.651 m (5' 5\")   Wt 59 kg (130 lb)   SpO2 100%   BMI 21.63 kg/m   Estimated body mass index is 21.63 kg/m  as calculated from the following:    Height as of this encounter: 1.651 m (5' 5\").    Weight as of this encounter: 59 kg (130 lb).    Eusebia Mcnair CMA    "

## 2019-05-08 LAB
BACTERIA SPEC CULT: NORMAL
SPECIMEN SOURCE: NORMAL

## 2019-05-14 ENCOUNTER — THERAPY VISIT (OUTPATIENT)
Dept: PHYSICAL THERAPY | Facility: CLINIC | Age: 34
End: 2019-05-14
Payer: COMMERCIAL

## 2019-05-14 DIAGNOSIS — K59.00 CONSTIPATION: ICD-10-CM

## 2019-05-14 DIAGNOSIS — N39.3 FEMALE STRESS INCONTINENCE: ICD-10-CM

## 2019-05-14 DIAGNOSIS — M62.89 PELVIC FLOOR DYSFUNCTION IN FEMALE: ICD-10-CM

## 2019-05-14 PROCEDURE — 97112 NEUROMUSCULAR REEDUCATION: CPT | Mod: GP | Performed by: PHYSICAL THERAPIST

## 2019-05-14 PROCEDURE — 97140 MANUAL THERAPY 1/> REGIONS: CPT | Mod: GP | Performed by: PHYSICAL THERAPIST

## 2019-05-14 PROCEDURE — 97110 THERAPEUTIC EXERCISES: CPT | Mod: GP | Performed by: PHYSICAL THERAPIST

## 2019-06-20 ENCOUNTER — OFFICE VISIT (OUTPATIENT)
Dept: FAMILY MEDICINE | Facility: CLINIC | Age: 34
End: 2019-06-20
Payer: COMMERCIAL

## 2019-06-20 VITALS
SYSTOLIC BLOOD PRESSURE: 106 MMHG | HEART RATE: 76 BPM | DIASTOLIC BLOOD PRESSURE: 67 MMHG | BODY MASS INDEX: 21.13 KG/M2 | TEMPERATURE: 97.8 F | WEIGHT: 127 LBS

## 2019-06-20 DIAGNOSIS — H66.004 RECURRENT ACUTE SUPPURATIVE OTITIS MEDIA OF RIGHT EAR WITHOUT SPONTANEOUS RUPTURE OF TYMPANIC MEMBRANE: ICD-10-CM

## 2019-06-20 DIAGNOSIS — J01.90 ACUTE SINUSITIS TREATED WITH ANTIBIOTICS IN THE PAST 60 DAYS: Primary | ICD-10-CM

## 2019-06-20 DIAGNOSIS — N76.0 VAGINITIS AND VULVOVAGINITIS: ICD-10-CM

## 2019-06-20 PROCEDURE — 99214 OFFICE O/P EST MOD 30 MIN: CPT | Performed by: FAMILY MEDICINE

## 2019-06-20 RX ORDER — FLUCONAZOLE 150 MG/1
150 TABLET ORAL ONCE
Qty: 1 TABLET | Refills: 0 | Status: SHIPPED | OUTPATIENT
Start: 2019-06-20 | End: 2019-07-10

## 2019-06-20 NOTE — PROGRESS NOTES
"Subjective     Mavis Krishnamurthy is a 34 year old female who presents to clinic today for the following health issues:    HPI   RESPIRATORY SYMPTOMS      Duration: ***    Description  { :130950}    Severity: {MILD, MODERATE, SEVERE LOW:334173}    Accompanying signs and symptoms: {NONE DEFAULTED:600204::\"None\"}    History (predisposing factors):  { :236978::\"none\"}    Precipitating or alleviating factors: {NONE DEFAULTED:144595::\"None\"}    Therapies tried and outcome:  { :322941::\"none\"}    {additonal problems for provider to add (Optional):749956}    {HIST REVIEW/ LINKS 2 (Optional):430850}    {Additional problems for the provider to add (optional):630778}  Reviewed and updated as needed this visit by Provider         Review of Systems   {ROS COMP (Optional):951214}      Objective    There were no vitals taken for this visit.  There is no height or weight on file to calculate BMI.  Physical Exam   {Exam List (Optional):062901}    {Diagnostic Test Results (Optional):774683::\"Diagnostic Test Results:\",\"Labs reviewed in Epic\"}        {PROVIDER CHARTING PREFERENCE:989340}      "

## 2019-06-20 NOTE — PROGRESS NOTES
Chief complaint: right ear discomfort    Went to [place] 3 weeks ago  Allergies and ears hurt   Patient went to [place] 2 weeks ago ago because of persistent ear discomfort  And was told that she had right ear infection and sinus infection  Was prescribed with amoxicillin for 10 days   Seemed to improve with amoxicillin   Has been off now for 3-4 days   Yesterday started having more right ear discharge sharp pain when she turns her head - not terrible- comes and goes  Also when she lays down right ear seems muffly   Mostly mild but flying again    Patient has chronic allergic rhinitis  Past few days increased discharge as well  Other symptoms: positive  cough, colds, sinus congestion  Fever: No  Tried over the counter medications without relief  No fevers or chills chest pain or shortness of breath   No rash  Ill-contacts: none   Because of persistent and worsening symptoms came in to be seen    Problem list and histories reviewed & adjusted, as indicated.  Additional history: as documented    Problem list, Medication list, Allergies, and Medical/Social/Surgical histories reviewed in EPIC and updated as appropriate.    ROS:  Constitutional, HEENT, cardiovascular, pulmonary, gi and gu systems are negative, except as otherwise noted.    OBJECTIVE:                                                    /67   Pulse 76   Temp 97.8  F (36.6  C) (Oral)   Wt 57.6 kg (127 lb)   BMI 21.13 kg/m    Body mass index is 21.13 kg/m .  GENERAL: healthy, alert and no distress  EYES: pink palpebral conjunctiva, anicteric sclera, pupils equally reactive to light and accomodation, extraocular muscles intact full and equal.  ENT: midline nasal septum, positive  nasal congestion   Left ear:no tragal tenderness, no mastoid tenderness normal and dull tympaninc membrane   Right ear: no tragal tenderness, no mastoid tenderness erythemaotus with bulging dull effusion tympaninc membrane   NECK: no adenopathy, no asymmetry or   masses  RESP: lungs clear to auscultation - no rales, rhonchi or wheezes  CV: regular rate and rhythm, normal S1 S2, no S3 or S4, no murmur, click or rub, no peripheral edema and peripheral pulses strong  ABDOMEN: soft, nontender, no hepatosplenomegaly, no masses and bowel sounds normal  MS: no gross musculoskeletal defects noted, no edema  NEURO: Normal strength and tone, mentation intact and speech normal    Diagnostic Test Results:  No results found for this or any previous visit (from the past 24 hour(s)).     ASSESSMENT/PLAN:                                                        ICD-10-CM    1. Acute sinusitis treated with antibiotics in the past 60 days J01.90 amoxicillin-clavulanate (AUGMENTIN) 875-125 MG tablet   2. Recurrent acute suppurative otitis media of right ear without spontaneous rupture of tympanic membrane H66.004 amoxicillin-clavulanate (AUGMENTIN) 875-125 MG tablet   3. Vaginitis and vulvovaginitis N76.0 fluconazole (DIFLUCAN) 150 MG tablet       Prescribed with augmentin  Side effects discussed warned about GI side effects and risk of cdiff.  Patient requested prescription for fluconazole as needed if she develops a yeast vaginitis. She says that she gets a yeast infection with antibiotics and monistat usually doesn't work. Aware that Fluconazole does have side effects that were discussed, aware of concerns for liver function and bone marrow suppression. Aware contraindicated if pregnant or breastfeeding. Advised to try monistat first then if not improved may use one dose of Fluconazole. But if persist, recommend being seen.     Recommend follow up with primary care provider if no relief, sooner if worse    Adverse reactions of medications discussed.  Over the counter medications discussed.   Aware to come back in if with worsening symptoms or if no relief despite treatment plan  Patient voiced understanding and had no further questions.     MD Heidy Todd  MD Kurt  [facility]

## 2019-07-10 ENCOUNTER — OFFICE VISIT (OUTPATIENT)
Dept: URGENT CARE | Facility: URGENT CARE | Age: 34
End: 2019-07-10
Payer: COMMERCIAL

## 2019-07-10 VITALS
WEIGHT: 126 LBS | BODY MASS INDEX: 20.97 KG/M2 | SYSTOLIC BLOOD PRESSURE: 119 MMHG | OXYGEN SATURATION: 98 % | HEART RATE: 83 BPM | DIASTOLIC BLOOD PRESSURE: 80 MMHG | TEMPERATURE: 98.4 F

## 2019-07-10 DIAGNOSIS — J01.40 SUBACUTE PANSINUSITIS: Primary | ICD-10-CM

## 2019-07-10 PROCEDURE — 99213 OFFICE O/P EST LOW 20 MIN: CPT | Performed by: NURSE PRACTITIONER

## 2019-07-10 RX ORDER — CEFDINIR 300 MG/1
300 CAPSULE ORAL 2 TIMES DAILY
Qty: 28 CAPSULE | Refills: 0 | Status: SHIPPED | OUTPATIENT
Start: 2019-07-10 | End: 2019-07-26

## 2019-07-10 ASSESSMENT — ENCOUNTER SYMPTOMS
WEIGHT LOSS: 1
MUSCULOSKELETAL NEGATIVE: 1
RESPIRATORY NEGATIVE: 1
SINUS PAIN: 1
HEADACHES: 1
GASTROINTESTINAL NEGATIVE: 1
COUGH: 0
CARDIOVASCULAR NEGATIVE: 1
EYES NEGATIVE: 1
FEVER: 0

## 2019-07-11 NOTE — PATIENT INSTRUCTIONS
Patient Education     Acute Sinusitis    Acute sinusitis is irritation and swelling of the sinuses. It is usually caused by a viral infection after a common cold. Your doctor can help you find relief.  What is acute sinusitis?  Sinuses are air-filled spaces in the skull behind the face. They are kept moist and clean by a lining of mucosa. Things such as pollen, smoke, and chemical fumes can irritate the mucosa. It can then swell up. As a response to irritation, the mucosa makes more mucus and other fluids. Tiny hairlike cilia cover the mucosa. Cilia help carry mucus toward the opening of the sinus. Too much mucus may cause the cilia to stop working. This blocks the sinus opening. A buildup of fluid in the sinuses then causes pain and pressure. It can also encourage bacteria to grow in the sinuses.  Common symptoms of acute sinusitis  You may have:    Facial soreness pain    Headache    Fever    Fluid draining in the back of the throat (postnasal drip)    Congestion    Drainage that is thick and colored, instead of clear    Cough  Diagnosing acute sinusitis  Your doctor will ask about your symptoms and health history. He or she will look at your ear, nose, and throat. You usually won't need to have X-rays taken.    The doctor may take a sample of mucus to check for bacteria. If you have sinusitis that keeps coming back, you may need imaging tests such as X-rays or CAT scans. This will help your doctor check for a structural problem that may be causing the infection.  Treating acute sinusitis  Treatment is aimed at unblocking the sinus opening and helping the cilia work again. You may need to take antihistamine and decongestant medicine. These can reduce inflammation and decrease the amount of fluid your sinuses make. If you have a bacterial infection, you will need to take antibiotic medicine for 10 to 14 days. Take this medicine until it is gone, even if you feel better.  Date Last Reviewed: 10/1/2016    [id number]  [employer]. [address] [place] [address], [place] [address]. All rights reserved. This information is not intended as a substitute for professional medical care. Always follow your healthcare professional's instructions.
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
## 2019-07-26 ENCOUNTER — OFFICE VISIT (OUTPATIENT)
Dept: OTOLARYNGOLOGY | Facility: CLINIC | Age: 34
End: 2019-07-26
Payer: COMMERCIAL

## 2019-07-26 VITALS
SYSTOLIC BLOOD PRESSURE: 112 MMHG | HEIGHT: 65 IN | DIASTOLIC BLOOD PRESSURE: 64 MMHG | WEIGHT: 126 LBS | BODY MASS INDEX: 20.99 KG/M2

## 2019-07-26 DIAGNOSIS — J35.8 CYST OF TONSIL: Primary | ICD-10-CM

## 2019-07-26 PROCEDURE — 99214 OFFICE O/P EST MOD 30 MIN: CPT | Performed by: OTOLARYNGOLOGY

## 2019-07-26 ASSESSMENT — MIFFLIN-ST. JEOR: SCORE: 1277.41

## 2019-07-26 NOTE — PATIENT INSTRUCTIONS
General Scheduling Information  To schedule your CT/MRI scan, please contact [place] [facility] at [phone]   [address] [place] [address]    To schedule your Surgery, please contact our Specialty Schedulers at [phone]    ENT Clinic Locations Clinic Hours Telephone Number     [facility]  [address] [place] [address]   Tuesday:       8:00am -- 4:00pm    Wednesday:  8:00am - 4:00pm   To schedule an appointment with   Dr. Jameson,   please contact our   Specialty Scheduling Department at:     [phone]       [facility]  [address] [place] [address]   Friday:          8:00am - 4:00pm         Urgent Care Locations Clinic Hours Telephone Numbers     [facility]  [address] [place] [address]     Monday-Friday:     11:00pm - 9:00pm    Saturday-Sunday:  9:00am - 5:00pm   [phone]     [facility]  [address] [place] [address]     Monday-Friday:      5:00pm - 9:00pm     Saturday-Sunday:  9:00am - 5:00pm   [phone]

## 2019-07-26 NOTE — LETTER
7/26/2019         RE: Mavis Krishnamurthy  [address] [address] [address]        Dear Colleague,    Thank you for referring your patient, Mavis Krishnamurthy, to the [facility]. Please see a copy of my visit note below.    Chief Complaint - tonsillitis    History of Present Illness - Mavis Krishnamurthy is a 34 year old female with concerns for tonsil stones and/or cysts. No pain. No bleeding. She doesn't try to get them out. She does feel she can taste her phlegm or throat. She recently took antibiotics for sinusitis. Dentist noted cysts or stones in tonsils and advised her to come in. She denies strep and tonsillitis.  I have seen her in the past for acid reflux disease.  She was found to have an islet patch of her esophagus.  She works with [employer] to have this treated.  Unfortunately it did recur recently and she is having more trouble.  She follows up with GI soon.    Past Medical History -   Patient Active Problem List   Diagnosis     CARDIOVASCULAR SCREENING; LDL GOAL LESS THAN 160     Allergic rhinitis     IBS (irritable bowel syndrome)     Chronic maxillary sinusitis     Lactose intolerance     Migraine     Abnormal antinuclear antibody titer     Bicuspid aortic valve     Supervision of other normal pregnancy, antepartum     Female stress incontinence     Pelvic floor dysfunction in female     Constipation       Current Medications -   Current Outpatient Medications:      ranitidine (ZANTAC) 150 MG tablet, Take 150 mg by mouth daily, Disp: , Rfl:     Allergies -   Allergies   Allergen Reactions     Bactrim [Sulfamethoxazole W/Trimethoprim]      Tongue burning, tingling,      Mold      Seasonal Allergies        Social History -   Social History     Socioeconomic History     Marital status:      Spouse name: Moo     Number of children: 1     Years of education: 16     Highest education level: None   Occupational History     Occupation: Accounting     Employer: [employer]   Social Needs      "Financial resource strain: None     Food insecurity:     Worry: None     Inability: None     Transportation needs:     Medical: None     Non-medical: None   Tobacco Use     Smoking status: Never Smoker     Smokeless tobacco: Never Used   Substance and Sexual Activity     Alcohol use: No     Drug use: No     Sexual activity: Not Currently     Partners: Male   Lifestyle     Physical activity:     Days per week: None     Minutes per session: None     Stress: None   Relationships     Social connections:     Talks on phone: None     Gets together: None     Attends [employer] service: None     Active member of club or organization: None     Attends meetings of clubs or organizations: None     Relationship status: None     Intimate partner violence:     Fear of current or ex partner: None     Emotionally abused: None     Physically abused: None     Forced sexual activity: None   Other Topics Concern     Parent/sibling w/ CABG, MI or angioplasty before 65F 55M? No   Social History Narrative     None       Family History -   Family History   Problem Relation Age of Onset     Hypertension Mother      Heart Disease Father         open heart for endocarditis     Neurologic Disorder Father 16        migraines     Alzheimer Disease Maternal Grandmother 80     Neurologic Disorder Paternal Grandmother         migraines unknown age     Neurologic Disorder Sister 20        migraines     Review of Systems - As per HPI and PMHx, otherwise 7 system review of the head and neck is negative.    Physical Exam  /64   Ht 1.651 m (5' 5\")   Wt 57.2 kg (126 lb)   BMI 20.97 kg/m     General - The patient is in no distress.  Alert and oriented x3, answers questions and cooperates with examination appropriately.   Voice and Breathing - The patient was breathing comfortably without the use of accessory muscles. There was no wheezing, stridor, or stertor.  The patients voice was clear and strong.  Eyes - Extraocular movements intact. Sclera " were not icteric or injected, conjunctiva were pink and moist.  Neurologic - Cranial nerves II-XII are grossly intact. Specifically, the facial nerve is intact, House-Brackmann grade 1 of 6.   Mouth - Examination of the oral cavity showed pink, healthy oral mucosa. No lesions or ulcerations noted.  The tongue was mobile and protrudes midline.  Oropharynx - The walls of the oropharynx were smooth, pink, moist, symmetric, and had no lesions or ulcerations.  The tonsils were 1-2+, cryptic, with some stones. She has one small, 2-4 mm tonsil cyst in the superior pole of each tonsil.  This is what she was referring to from her chief complaint.  No evidence of true mass or malignancy.  The uvula was midline and the palate raised symmetrically.   Neck -  Palpation of the occipital, submental, submandibular, internal jugular chain, and supraclavicular nodes did not demonstrate any abnormal lymph nodes or masses. The parotid glands were without masses. Palpation of the thyroid was soft and smooth, with no nodules or goiter appreciated.  The trachea was midline.  Cardiovascular - carotid pulses are 2+ bilaterally, regular rhythm    A/P - Mavis DALE Krishnamurthy is a 34 year old female with tonsil stones and concern for what appears to be benign tonsil cyst.  I provided reassurance to her.  She does not get recurrent tonsillitis or sore throats.  Therefore I do not recommend tonsillectomy.  She can continue to do things to remove the stones.  If the cyst enlarge, become infected, or cause pain, she will return.     Obie Jameson MD  Otolaryngology  [facility]            Again, thank you for allowing me to participate in the care of your patient.        Sincerely,        Obie Jameson MD

## 2019-07-26 NOTE — PROGRESS NOTES
Chief Complaint - tonsillitis    History of Present Illness - Mavis Krishnamurthy is a 34 year old female with concerns for tonsil stones and/or cysts. No pain. No bleeding. She doesn't try to get them out. She does feel she can taste her phlegm or throat. She recently took antibiotics for sinusitis. Dentist noted cysts or stones in tonsils and advised her to come in. She denies strep and tonsillitis.  I have seen her in the past for acid reflux disease.  She was found to have an islet patch of her esophagus.  She works with [employer] to have this treated.  Unfortunately it did recur recently and she is having more trouble.  She follows up with GI soon.    Past Medical History -   Patient Active Problem List   Diagnosis     CARDIOVASCULAR SCREENING; LDL GOAL LESS THAN 160     Allergic rhinitis     IBS (irritable bowel syndrome)     Chronic maxillary sinusitis     Lactose intolerance     Migraine     Abnormal antinuclear antibody titer     Bicuspid aortic valve     Supervision of other normal pregnancy, antepartum     Female stress incontinence     Pelvic floor dysfunction in female     Constipation       Current Medications -   Current Outpatient Medications:      ranitidine (ZANTAC) 150 MG tablet, Take 150 mg by mouth daily, Disp: , Rfl:     Allergies -   Allergies   Allergen Reactions     Bactrim [Sulfamethoxazole W/Trimethoprim]      Tongue burning, tingling,      Mold      Seasonal Allergies        Social History -   Social History     Socioeconomic History     Marital status:      Spouse name: Moo     Number of children: 1     Years of education: 16     Highest education level: None   Occupational History     Occupation: Accounting     Employer: [employer]   Social Needs     Financial resource strain: None     Food insecurity:     Worry: None     Inability: None     Transportation needs:     Medical: None     Non-medical: None   Tobacco Use     Smoking status: Never Smoker     Smokeless tobacco: Never Used  "  Substance and Sexual Activity     Alcohol use: No     Drug use: No     Sexual activity: Not Currently     Partners: Male   Lifestyle     Physical activity:     Days per week: None     Minutes per session: None     Stress: None   Relationships     Social connections:     Talks on phone: None     Gets together: None     Attends Mandaen service: None     Active member of club or organization: None     Attends meetings of clubs or organizations: None     Relationship status: None     Intimate partner violence:     Fear of current or ex partner: None     Emotionally abused: None     Physically abused: None     Forced sexual activity: None   Other Topics Concern     Parent/sibling w/ CABG, MI or angioplasty before 65F 55M? No   Social History Narrative     None       Family History -   Family History   Problem Relation Age of Onset     Hypertension Mother      Heart Disease Father         open heart for endocarditis     Neurologic Disorder Father 16        migraines     Alzheimer Disease Maternal Grandmother 80     Neurologic Disorder Paternal Grandmother         migraines unknown age     Neurologic Disorder Sister 20        migraines     Review of Systems - As per HPI and PMHx, otherwise 7 system review of the head and neck is negative.    Physical Exam  /64   Ht 1.651 m (5' 5\")   Wt 57.2 kg (126 lb)   BMI 20.97 kg/m    General - The patient is in no distress.  Alert and oriented x3, answers questions and cooperates with examination appropriately.   Voice and Breathing - The patient was breathing comfortably without the use of accessory muscles. There was no wheezing, stridor, or stertor.  The patients voice was clear and strong.  Eyes - Extraocular movements intact. Sclera were not icteric or injected, conjunctiva were pink and moist.  Neurologic - Cranial nerves II-XII are grossly intact. Specifically, the facial nerve is intact, House-Brackmann grade 1 of 6.   Mouth - Examination of the oral cavity showed " pink, healthy oral mucosa. No lesions or ulcerations noted.  The tongue was mobile and protrudes midline.  Oropharynx - The walls of the oropharynx were smooth, pink, moist, symmetric, and had no lesions or ulcerations.  The tonsils were 1-2+, cryptic, with some stones. She has one small, 2-4 mm tonsil cyst in the superior pole of each tonsil.  This is what she was referring to from her chief complaint.  No evidence of true mass or malignancy.  The uvula was midline and the palate raised symmetrically.   Neck -  Palpation of the occipital, submental, submandibular, internal jugular chain, and supraclavicular nodes did not demonstrate any abnormal lymph nodes or masses. The parotid glands were without masses. Palpation of the thyroid was soft and smooth, with no nodules or goiter appreciated.  The trachea was midline.  Cardiovascular - carotid pulses are 2+ bilaterally, regular rhythm    A/P - Mavismp Krishnamurthy is a 34 year old female with tonsil stones and concern for what appears to be benign tonsil cyst.  I provided reassurance to her.  She does not get recurrent tonsillitis or sore throats.  Therefore I do not recommend tonsillectomy.  She can continue to do things to remove the stones.  If the cyst enlarge, become infected, or cause pain, she will return.     Obie Jameson MD  Otolaryngology  [facility]

## 2019-08-02 ENCOUNTER — TRANSFERRED RECORDS (OUTPATIENT)
Dept: HEALTH INFORMATION MANAGEMENT | Facility: CLINIC | Age: 34
End: 2019-08-02

## 2019-10-08 ENCOUNTER — TRANSFERRED RECORDS (OUTPATIENT)
Dept: HEALTH INFORMATION MANAGEMENT | Facility: CLINIC | Age: 34
End: 2019-10-08

## 2019-11-08 ENCOUNTER — TRANSFERRED RECORDS (OUTPATIENT)
Dept: HEALTH INFORMATION MANAGEMENT | Facility: CLINIC | Age: 34
End: 2019-11-08

## 2019-11-20 ENCOUNTER — ANCILLARY PROCEDURE (OUTPATIENT)
Dept: ULTRASOUND IMAGING | Facility: CLINIC | Age: 34
End: 2019-11-20
Attending: NURSE PRACTITIONER
Payer: COMMERCIAL

## 2019-11-20 ENCOUNTER — OFFICE VISIT (OUTPATIENT)
Dept: OBGYN | Facility: CLINIC | Age: 34
End: 2019-11-20
Payer: COMMERCIAL

## 2019-11-20 VITALS
BODY MASS INDEX: 20.03 KG/M2 | DIASTOLIC BLOOD PRESSURE: 81 MMHG | HEART RATE: 80 BPM | OXYGEN SATURATION: 95 % | TEMPERATURE: 98.5 F | WEIGHT: 120.2 LBS | HEIGHT: 65 IN | SYSTOLIC BLOOD PRESSURE: 132 MMHG

## 2019-11-20 DIAGNOSIS — N92.0 EXCESSIVE OR FREQUENT MENSTRUATION: ICD-10-CM

## 2019-11-20 DIAGNOSIS — N92.0 EXCESSIVE OR FREQUENT MENSTRUATION: Primary | ICD-10-CM

## 2019-11-20 LAB
ERYTHROCYTE [DISTWIDTH] IN BLOOD BY AUTOMATED COUNT: 13.9 % (ref 10–15)
HCT VFR BLD AUTO: 39.1 % (ref 35–47)
HGB BLD-MCNC: 12.7 G/DL (ref 11.7–15.7)
MCH RBC QN AUTO: 29.5 PG (ref 26.5–33)
MCHC RBC AUTO-ENTMCNC: 32.5 G/DL (ref 31.5–36.5)
MCV RBC AUTO: 91 FL (ref 78–100)
PLATELET # BLD AUTO: 276 10E9/L (ref 150–450)
RBC # BLD AUTO: 4.3 10E12/L (ref 3.8–5.2)
TSH SERPL DL<=0.005 MIU/L-ACNC: 0.6 MU/L (ref 0.4–4)
WBC # BLD AUTO: 9.1 10E9/L (ref 4–11)

## 2019-11-20 PROCEDURE — 76830 TRANSVAGINAL US NON-OB: CPT

## 2019-11-20 PROCEDURE — 84443 ASSAY THYROID STIM HORMONE: CPT | Performed by: NURSE PRACTITIONER

## 2019-11-20 PROCEDURE — 85027 COMPLETE CBC AUTOMATED: CPT | Performed by: NURSE PRACTITIONER

## 2019-11-20 PROCEDURE — 76856 US EXAM PELVIC COMPLETE: CPT | Mod: 59

## 2019-11-20 PROCEDURE — 99214 OFFICE O/P EST MOD 30 MIN: CPT | Performed by: NURSE PRACTITIONER

## 2019-11-20 PROCEDURE — 36415 COLL VENOUS BLD VENIPUNCTURE: CPT | Performed by: NURSE PRACTITIONER

## 2019-11-20 ASSESSMENT — MIFFLIN-ST. JEOR: SCORE: 1251.1

## 2019-11-20 ASSESSMENT — PAIN SCALES - GENERAL: PAINLEVEL: MODERATE PAIN (5)
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
## 2019-11-21 ENCOUNTER — TELEPHONE (OUTPATIENT)
Dept: FAMILY MEDICINE | Facility: CLINIC | Age: 34
End: 2019-11-21

## 2019-11-21 NOTE — TELEPHONE ENCOUNTER
Reason for Call:  Other prescription    Detailed comments: patient has just taken her first dose of progesterone and now she is dizzy, BP is high, feels like she is going to pass out. Patient is wondering if this is side effects from medication   Please call to discuss  Thank you     Phone Number Patient can be reached at: Home number on file [phone] (home)    Best Time:     Can we leave a detailed message on this number? YES    Call taken on 11/21/2019 at 7:41 AM by Ghada Foster

## 2019-11-21 NOTE — TELEPHONE ENCOUNTER
Agree with RN recommendations. If patient wants to try a lower dose, we can send a new prescription for 100 mg instead of the 200 mg. It may just take a little longer for bleeding to stop. If she experiences the same symptoms after taking medication at night, would recommend she discontinue. Would also have her hold off until tomorrow night now before taking next dose since she already took it today.   If she has a blood pressure cuff at home, can try checking. Today should push fluids. In the past, cardiology has advised her to increase salt to increase blood pressure and she can try this today as well. If symptoms worsen, needs to send another message or proceed to ED (with someone else driving her) if needed. Edilia CAVANAUGH CNP

## 2019-11-21 NOTE — TELEPHONE ENCOUNTER
Patient called clinic back. I relayed message from below. No call back needed. She will try continuing to take it at night starting tomorrow night.

## 2019-11-21 NOTE — TELEPHONE ENCOUNTER
Spoke with pt.  She states she took her first Prometrium capsule this morning around 6 am.  She immediately had a bagel and cream cheese and juice afterwards.  Around 7:15 she started feeling dizzy and drowsy.  She noticed on the bottle that it said to take at night time.  Pt also read that it may cause dizziness and drowsiness.    Per UpToDate on side effects of Prometrium:  Oral (percentages reported when used in combination with or cycled with conjugated estrogens):  >10%:  Central nervous system: Headache (16% to 31%), dizziness (15% to 24%), depression (19%)  Endocrine & metabolic: Breast tenderness (27%), mastalgia (6% to 16%)  Gastrointestinal: Abdominal pain (20%), bloating (12%)  Genitourinary: Urinary tract abnormality (11%)  Infection: Viral infection (12%)  Neuromuscular & skeletal: Musculoskeletal pain (12%)    I explained to pt that it can cause dizziness and drowsiness that is why it is recommended to take at night time.  Pt is wondering if since her BP is usually really low if she needed to worry about her BP and the dizziness.  She states yesterday at her OV it was 132/81 which is high for her.  She denies any chest pain, difficulty breathing, vision changes or light headedness.    I advised pt not to drive.  She states she works from home so she isn't going anywhere.  I advised pt to take a nap if able and if she does get up and move around to do it slowly so she doesn't fall.  If dizziness gets worse or is there after her nap then she should be further evaluated.  I also advised that pt take her next doses of Prometrium at night time before bed.    I will also route to AFSHAN Meza CNP, for further recommendations.    Claire Dumont RN

## 2019-11-25 ENCOUNTER — MYC MEDICAL ADVICE (OUTPATIENT)
Dept: OBGYN | Facility: CLINIC | Age: 34
End: 2019-11-25

## 2019-11-25 DIAGNOSIS — N92.0 EXCESSIVE OR FREQUENT MENSTRUATION: Primary | ICD-10-CM

## 2019-11-25 DIAGNOSIS — R10.2 PELVIC PAIN IN FEMALE: ICD-10-CM

## 2019-11-25 NOTE — TELEPHONE ENCOUNTER
"Patient was seen in clinic for excessive or frequent mensuration on 11/20/2019, per OV note, \"1. Excessive or frequent menstruation  We discussed possible etiologies of her menstrual cycle changes. Check labs and ultrasound per below and discussed rationale. In the meantime, while we await results, will start continuous oral progesterone to see if we can stop the bleeding. Has history of migraines with aura so we will not start combined oral contraceptive pills at this time. Discussed side effects, taking it regularly and to expect a withdrawal bleed once stopping it. Warning signs to monitor for and report immediately discussed with patient and she verbalizes understanding. Will follow up with patient when results available.  - CBC with platelets  - US Pelvic Complete with Transvaginal; Future  - TSH with free T4 reflex  - progesterone (PROMETRIUM) 200 MG capsule; Take 1 capsule (200 mg) by mouth daily  Dispense: 28 capsule; Refill: 1\"      Britany Clinton RN on 11/25/2019 at 8:51 AM      "

## 2019-12-03 NOTE — TELEPHONE ENCOUNTER
RN relayed message to patient via [employer] communication.     Britany Clinton RN on 12/3/2019 at 2:08 PM

## 2019-12-03 NOTE — TELEPHONE ENCOUNTER
Ultrasound and labs done 2 weeks ago, no resolution of symptoms with Prometrium. At this time, I would recommend a follow up appointment with Dr. Sapp for additional evaluation or we could try an alternate progesterone pill to see if we can get a better and longer lasting improvement in symptoms-however, if she is bleeding again with the Prometrium, I am not too hopeful that a new pill would work any better. Thank you. Edilia CAVANAUGH CNP

## 2019-12-04 ENCOUNTER — MYC MEDICAL ADVICE (OUTPATIENT)
Dept: OBGYN | Facility: CLINIC | Age: 34
End: 2019-12-04

## 2019-12-04 DIAGNOSIS — R10.2 PELVIC PAIN IN FEMALE: Primary | ICD-10-CM

## 2019-12-04 PROBLEM — M62.89 PELVIC FLOOR DYSFUNCTION IN FEMALE: Status: RESOLVED | Noted: 2019-04-03 | Resolved: 2019-12-04

## 2019-12-04 PROBLEM — K59.00 CONSTIPATION: Status: RESOLVED | Noted: 2019-04-03 | Resolved: 2019-12-04

## 2019-12-04 PROBLEM — N39.3 FEMALE STRESS INCONTINENCE: Status: RESOLVED | Noted: 2019-04-03 | Resolved: 2019-12-04

## 2019-12-04 NOTE — TELEPHONE ENCOUNTER
Per 11/25/19 pelont encounter:      2:04 PM   Note      Ultrasound and labs done 2 weeks ago, no resolution of symptoms with Prometrium. At this time, I would recommend a follow up appointment with Dr. Sapp for additional evaluation or we could try an alternate progesterone pill to see if we can get a better and longer lasting improvement in symptoms-however, if she is bleeding again with the Prometrium, I am not too hopeful that a new pill would work any better. Thank you. Edilia CAVANAUGH CNP

## 2019-12-04 NOTE — PROGRESS NOTES
"Discharge Note    Progress reporting period is from initial evaluation date (please see noted date below) to May 14, 2019.  Linked Episodes   Type: Episode: Status: Noted: Resolved: Last update: Updated by:   PHYSICAL THERAPY pelvic floor 4-3-19 Active 4/3/2019  5/14/2019  6:39 AM Rebeca Oconnor, PT      Comments:       Mavis failed to follow up and current status is unknown.  Please see information below for last relevant information on current status.  Patient seen for 2 visits.    SUBJECTIVE  Subjective changes noted by patient:  The following is from last visit seen-Hard to feel if engaging the correct mm's. No longer having any stress incontinence, she did some jumping jacks which were uncomfortable and had fear of leakage. No longer has UTI(did still have at last visit). The urgency is much improved. 2-4 hrs between voids and not waking at night now to go. The constipation is slightly better, going about 3x/wk vs 1-2x/wk.  Following the bowel program  .  Current pain level is  .     Previous pain level was  0/10.   Changes in function:  Yes (See Goal flowsheet attached for changes in current functional level)  Adverse reaction to treatment or activity: None    OBJECTIVE  Changes noted in objective findings: able to activate R>L PFM, though weak. Verbal and tactile cues needed. Hold time about 5\"     ASSESSMENT/PLAN  Diagnosis: stress incontinenece, pelvic floor dysfunction, constipation   Updated problem list and treatment plan:   HEP  STG/LTGs have been met or progress has been made towards goals:  Yes, please see goal flowsheet for most current information  Assessment of Progress: current status is unknown.    Last current status: Pt is progressing as expected   Self Management Plans:  HEP  I have re-evaluated this patient and find that the nature, scope, duration and intensity of the therapy is appropriate for the medical condition of the patient.  Mavis continues to require the following intervention to " meet STG and LTG's:  HEP.    Recommendations:  Discharge with current home program.  Patient to follow up with MD as needed.    Please refer to the daily flowsheet for treatment today, total treatment time and time spent performing 1:1 timed codes.

## 2019-12-05 NOTE — TELEPHONE ENCOUNTER
With worsening symptoms and onset of a fever, I would recommend evaluation in the ED today. It's possible there is something else not gynecologic causing her symptoms. And with these new symptoms, would not recommend waiting another week to be seen. Thank you. Edilia CAVANAUGH CNP

## 2019-12-05 NOTE — TELEPHONE ENCOUNTER
[employer] message routed to provider for review and response. Please advise.      Meg Connor CMA  8:07 AM  December 5, 2019

## 2019-12-06 DIAGNOSIS — R10.2 PELVIC PAIN IN FEMALE: ICD-10-CM

## 2019-12-06 LAB
ALBUMIN UR-MCNC: NEGATIVE MG/DL
APPEARANCE UR: CLEAR
BILIRUB UR QL STRIP: NEGATIVE
COLOR UR AUTO: YELLOW
GLUCOSE UR STRIP-MCNC: NEGATIVE MG/DL
HGB UR QL STRIP: NEGATIVE
KETONES UR STRIP-MCNC: NEGATIVE MG/DL
LEUKOCYTE ESTERASE UR QL STRIP: NEGATIVE
MUCOUS THREADS #/AREA URNS LPF: PRESENT /LPF
NITRATE UR QL: NEGATIVE
PH UR STRIP: 6.5 PH (ref 5–7)
RBC #/AREA URNS AUTO: ABNORMAL /HPF
SOURCE: ABNORMAL
SP GR UR STRIP: 1.02 (ref 1–1.03)
SPECIMEN SOURCE: NORMAL
UROBILINOGEN UR STRIP-ACNC: 0.2 EU/DL (ref 0.2–1)
WBC #/AREA URNS AUTO: ABNORMAL /HPF
WET PREP SPEC: NORMAL

## 2019-12-06 PROCEDURE — 81001 URINALYSIS AUTO W/SCOPE: CPT | Performed by: NURSE PRACTITIONER

## 2019-12-06 PROCEDURE — 87491 CHLMYD TRACH DNA AMP PROBE: CPT | Performed by: NURSE PRACTITIONER

## 2019-12-06 PROCEDURE — 87591 N.GONORRHOEAE DNA AMP PROB: CPT | Performed by: NURSE PRACTITIONER

## 2019-12-06 PROCEDURE — 87086 URINE CULTURE/COLONY COUNT: CPT | Performed by: NURSE PRACTITIONER

## 2019-12-06 PROCEDURE — 87210 SMEAR WET MOUNT SALINE/INK: CPT | Performed by: NURSE PRACTITIONER

## 2019-12-07 LAB
BACTERIA SPEC CULT: NORMAL
C TRACH DNA SPEC QL NAA+PROBE: NEGATIVE
N GONORRHOEA DNA SPEC QL NAA+PROBE: NEGATIVE
SPECIMEN SOURCE: NORMAL

## 2019-12-10 ENCOUNTER — OFFICE VISIT (OUTPATIENT)
Dept: OTOLARYNGOLOGY | Facility: CLINIC | Age: 34
End: 2019-12-10
Payer: COMMERCIAL

## 2019-12-10 VITALS
HEIGHT: 65 IN | BODY MASS INDEX: 19.99 KG/M2 | SYSTOLIC BLOOD PRESSURE: 106 MMHG | OXYGEN SATURATION: 100 % | HEART RATE: 77 BPM | WEIGHT: 120 LBS | DIASTOLIC BLOOD PRESSURE: 69 MMHG

## 2019-12-10 DIAGNOSIS — J35.8 CYST OF TONSIL: ICD-10-CM

## 2019-12-10 DIAGNOSIS — K09.8: Primary | ICD-10-CM

## 2019-12-10 PROCEDURE — 99213 OFFICE O/P EST LOW 20 MIN: CPT | Performed by: OTOLARYNGOLOGY

## 2019-12-10 ASSESSMENT — MIFFLIN-ST. JEOR: SCORE: 1250.2

## 2019-12-10 NOTE — PATIENT INSTRUCTIONS
General Scheduling Information  To schedule your CT/MRI scan, please contact [place] [facility] at [phone] [address] [place] [address]    To schedule your Surgery, please contact our Specialty Schedulers at [phone]    ENT Clinic Locations Clinic Hours Telephone Number     [facility]  [address] [place] [address]   Tuesday:       8:00am -- 4:00pm    Wednesday:  8:00am - 4:00pm   To schedule an appointment with   Dr. Jameson,   please contact our   Specialty Scheduling Department at:     [phone]       [facility]  [address] [place] [address]   Friday:          8:00am - 4:00pm         Urgent Care Locations Clinic Hours Telephone Numbers     [facility]  [address] [place] [address]     Monday-Friday:     11:00pm - 9:00pm    Saturday-Sunday:  9:00am - 5:00pm   [phone]     [facility]  [address] [place] [address]     Monday-Friday:      5:00pm - 9:00pm     Saturday-Sunday:  9:00am - 5:00pm   [phone]

## 2019-12-10 NOTE — LETTER
12/10/2019         RE: Mavis Krishnamurthy  [address] [facility] [address]        Dear Colleague,    Thank you for referring your patient, Mavis Krishnamurthy, to the [facility]. Please see a copy of my visit note below.    Chief Complaint - tonsillitis    History of Present Illness - Mavis Krishnamurthy is a 34 year old female with concerns for tonsil stones and/or cysts and now a tongue lesion. I saw her for this 7/2019. Now the dentist noted a tongue bump in the back which is different. No pain. No bleeding. Dentist noted cysts or stones in tonsils and advised her to come in over the summer. She denies strep and tonsillitis.     I have seen her in the past for acid reflux disease.  She was found to have an islet patch of her esophagus. She has had 3 ablations, last one 8/2019. She is doing better, but the last couple weeks it has worsened with progesterone. She has taken prilosec.     Past Medical History -   Patient Active Problem List   Diagnosis     CARDIOVASCULAR SCREENING; LDL GOAL LESS THAN 160     Allergic rhinitis     IBS (irritable bowel syndrome)     Chronic maxillary sinusitis     Lactose intolerance     Migraine     Abnormal antinuclear antibody titer     Bicuspid aortic valve     Supervision of other normal pregnancy, antepartum       Current Medications -   Current Outpatient Medications:      progesterone (PROMETRIUM) 200 MG capsule, Take 1 capsule (200 mg) by mouth daily, Disp: 28 capsule, Rfl: 1    Allergies -   Allergies   Allergen Reactions     Bactrim [Sulfamethoxazole W/Trimethoprim]      Tongue burning, tingling,      Mold      Seasonal Allergies        Social History -   Social History     Socioeconomic History     Marital status:      Spouse name: Moo     Number of children: 1     Years of education: 16     Highest education level: None   Occupational History     Occupation: Accounting     Employer: [employer]   Social Needs     Financial resource strain: None      Food insecurity:     Worry: None     Inability: None     Transportation needs:     Medical: None     Non-medical: None   Tobacco Use     Smoking status: Never Smoker     Smokeless tobacco: Never Used   Substance and Sexual Activity     Alcohol use: No     Drug use: No     Sexual activity: Not Currently     Partners: Male   Lifestyle     Physical activity:     Days per week: None     Minutes per session: None     Stress: None   Relationships     Social connections:     Talks on phone: None     Gets together: None     Attends Advent service: None     Active member of club or organization: None     Attends meetings of clubs or organizations: None     Relationship status: None     Intimate partner violence:     Fear of current or ex partner: None     Emotionally abused: None     Physically abused: None     Forced sexual activity: None   Other Topics Concern     Parent/sibling w/ CABG, MI or angioplasty before 65F 55M? No   Social History Narrative     None       Family History -   Family History   Problem Relation Age of Onset     Hypertension Mother      Heart Disease Father         open heart for endocarditis     Neurologic Disorder Father 16        migraines     Alzheimer Disease Maternal Grandmother 80     Neurologic Disorder Paternal Grandmother         migraines unknown age     Neurologic Disorder Sister 20        migraines     Review of Systems - As per HPI and PMHx, otherwise 7 system review of the head and neck is negative.    Physical Exam  General - The patient is in no distress.  Alert and oriented x3, answers questions and cooperates with examination appropriately.   Voice and Breathing - The patient was breathing comfortably without the use of accessory muscles. There was no wheezing, stridor, or stertor.  The patients voice was clear and strong.  Eyes - Extraocular movements intact. Sclera were not icteric or injected, conjunctiva were pink and moist.  Neurologic - Cranial nerves II-XII are grossly  intact. Specifically, the facial nerve is intact, House-Brackmann grade 1 of 6.   Mouth - Examination of the oral cavity showed pink, healthy oral mucosa. No lesions or ulcerations noted.  The tongue was mobile and protrudes midline.  Oropharynx - The walls of the oropharynx were smooth, pink, moist, symmetric, and had no lesions or ulcerations.  The tonsils were 1-2+, cryptic, with some stones. She has one small, 2-4 mm tonsil cyst in the superior pole of each tonsil. They look very symmetric.  She has an approximate 5 mm cyst of the left lingual tonsil that was best visualized using a dental mirror.  It is smooth and mostly white or yellow.  No evidence of true mass or malignancy.  The uvula was midline and the palate raised symmetrically.   Neck -  Palpation of the occipital, submental, submandibular, internal jugular chain, and supraclavicular nodes did not demonstrate any abnormal lymph nodes or masses. The parotid glands were without masses. Palpation of the thyroid was soft and smooth, with no nodules or goiter appreciated.  The trachea was midline.      A/P - Mavis Krishnamurthy is a 34 year old female with tonsil stones and concern for what appears to be benign tonsil cysts.  Now she has discovered a very small lingual tonsil cyst or lymphoepithelial cyst.  This is the left base of tongue. I provided reassurance to her.  She does not get recurrent tonsillitis or sore throats.  Therefore I do not recommend tonsillectomy or lingual tonsillectomy.  She can continue to do things to remove the stones.  If the cysts enlarge, become infected, or cause pain, bleed, etc. she will return.     Obie Jameson MD  Otolaryngology  [facility]            Again, thank you for allowing me to participate in the care of your patient.        Sincerely,        Obie Jameson MD

## 2019-12-10 NOTE — PROGRESS NOTES
Chief Complaint - tonsillitis    History of Present Illness - Mavis Krishnamurthy is a 34 year old female with concerns for tonsil stones and/or cysts and now a tongue lesion. I saw her for this 7/2019. Now the dentist noted a tongue bump in the back which is different. No pain. No bleeding. Dentist noted cysts or stones in tonsils and advised her to come in over the summer. She denies strep and tonsillitis.     I have seen her in the past for acid reflux disease.  She was found to have an islet patch of her esophagus. She has had 3 ablations, last one 8/2019. She is doing better, but the last couple weeks it has worsened with progesterone. She has taken prilosec.     Past Medical History -   Patient Active Problem List   Diagnosis     CARDIOVASCULAR SCREENING; LDL GOAL LESS THAN 160     Allergic rhinitis     IBS (irritable bowel syndrome)     Chronic maxillary sinusitis     Lactose intolerance     Migraine     Abnormal antinuclear antibody titer     Bicuspid aortic valve     Supervision of other normal pregnancy, antepartum       Current Medications -   Current Outpatient Medications:      progesterone (PROMETRIUM) 200 MG capsule, Take 1 capsule (200 mg) by mouth daily, Disp: 28 capsule, Rfl: 1    Allergies -   Allergies   Allergen Reactions     Bactrim [Sulfamethoxazole W/Trimethoprim]      Tongue burning, tingling,      Mold      Seasonal Allergies        Social History -   Social History     Socioeconomic History     Marital status:      Spouse name: Moo     Number of children: 1     Years of education: 16     Highest education level: None   Occupational History     Occupation: Accounting     Employer: [employer]   Social Needs     Financial resource strain: None     Food insecurity:     Worry: None     Inability: None     Transportation needs:     Medical: None     Non-medical: None   Tobacco Use     Smoking status: Never Smoker     Smokeless tobacco: Never Used   Substance and Sexual Activity      Alcohol use: No     Drug use: No     Sexual activity: Not Currently     Partners: Male   Lifestyle     Physical activity:     Days per week: None     Minutes per session: None     Stress: None   Relationships     Social connections:     Talks on phone: None     Gets together: None     Attends Restoration service: None     Active member of club or organization: None     Attends meetings of clubs or organizations: None     Relationship status: None     Intimate partner violence:     Fear of current or ex partner: None     Emotionally abused: None     Physically abused: None     Forced sexual activity: None   Other Topics Concern     Parent/sibling w/ CABG, MI or angioplasty before 65F 55M? No   Social History Narrative     None       Family History -   Family History   Problem Relation Age of Onset     Hypertension Mother      Heart Disease Father         open heart for endocarditis     Neurologic Disorder Father 16        migraines     Alzheimer Disease Maternal Grandmother 80     Neurologic Disorder Paternal Grandmother         migraines unknown age     Neurologic Disorder Sister 20        migraines     Review of Systems - As per HPI and PMHx, otherwise 7 system review of the head and neck is negative.    Physical Exam  General - The patient is in no distress.  Alert and oriented x3, answers questions and cooperates with examination appropriately.   Voice and Breathing - The patient was breathing comfortably without the use of accessory muscles. There was no wheezing, stridor, or stertor.  The patients voice was clear and strong.  Eyes - Extraocular movements intact. Sclera were not icteric or injected, conjunctiva were pink and moist.  Neurologic - Cranial nerves II-XII are grossly intact. Specifically, the facial nerve is intact, House-Brackmann grade 1 of 6.   Mouth - Examination of the oral cavity showed pink, healthy oral mucosa. No lesions or ulcerations noted.  The tongue was mobile and protrudes  midline.  Oropharynx - The walls of the oropharynx were smooth, pink, moist, symmetric, and had no lesions or ulcerations.  The tonsils were 1-2+, cryptic, with some stones. She has one small, 2-4 mm tonsil cyst in the superior pole of each tonsil. They look very symmetric.  She has an approximate 5 mm cyst of the left lingual tonsil that was best visualized using a dental mirror.  It is smooth and mostly white or yellow.  No evidence of true mass or malignancy.  The uvula was midline and the palate raised symmetrically.   Neck -  Palpation of the occipital, submental, submandibular, internal jugular chain, and supraclavicular nodes did not demonstrate any abnormal lymph nodes or masses. The parotid glands were without masses. Palpation of the thyroid was soft and smooth, with no nodules or goiter appreciated.  The trachea was midline.      A/P - Mavis Krishnamurthy is a 34 year old female with tonsil stones and concern for what appears to be benign tonsil cysts.  Now she has discovered a very small lingual tonsil cyst or lymphoepithelial cyst.  This is the left base of tongue. I provided reassurance to her.  She does not get recurrent tonsillitis or sore throats.  Therefore I do not recommend tonsillectomy or lingual tonsillectomy.  She can continue to do things to remove the stones.  If the cysts enlarge, become infected, or cause pain, bleed, etc. she will return.     Obie Jameson MD  Otolaryngology  [facility]

## 2019-12-12 ENCOUNTER — OFFICE VISIT (OUTPATIENT)
Dept: OBGYN | Facility: CLINIC | Age: 34
End: 2019-12-12
Payer: COMMERCIAL

## 2019-12-12 VITALS
WEIGHT: 118 LBS | TEMPERATURE: 97.2 F | HEART RATE: 88 BPM | SYSTOLIC BLOOD PRESSURE: 130 MMHG | BODY MASS INDEX: 19.64 KG/M2 | DIASTOLIC BLOOD PRESSURE: 78 MMHG

## 2019-12-12 DIAGNOSIS — N92.6 IRREGULAR BLEEDING: Primary | ICD-10-CM

## 2019-12-12 PROCEDURE — 99214 OFFICE O/P EST MOD 30 MIN: CPT | Performed by: OBSTETRICS & GYNECOLOGY
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
## 2019-12-26 ENCOUNTER — OFFICE VISIT (OUTPATIENT)
Dept: OBGYN | Facility: CLINIC | Age: 34
End: 2019-12-26
Payer: COMMERCIAL

## 2019-12-26 VITALS
TEMPERATURE: 98.3 F | DIASTOLIC BLOOD PRESSURE: 72 MMHG | BODY MASS INDEX: 19.64 KG/M2 | WEIGHT: 118 LBS | SYSTOLIC BLOOD PRESSURE: 120 MMHG | HEART RATE: 72 BPM

## 2019-12-26 DIAGNOSIS — N92.1 MENOMETRORRHAGIA: Primary | ICD-10-CM

## 2019-12-26 PROCEDURE — 99214 OFFICE O/P EST MOD 30 MIN: CPT | Performed by: OBSTETRICS & GYNECOLOGY

## 2019-12-26 NOTE — PROGRESS NOTES
Mavis is a 34 year old   here for follow up of her irregular bleeding.  It has stopped today, but very heavy over the weekend.  .  ROS: No urinary frequency or dysuria, bladder or kidney problems  ROS: Ten point review of systems was reviewed and negative except the above.    PMH: Her past medical, surgical, and obstetric histories were reviewed and are documented in their appropriate chart areas.    ALL/Meds: Her medication and allergy histories were reviewed and are documented in their appropriate chart areas.    SH/FMH: Reviewed and documented in the appropriate area.    PE: /72   Pulse 72   Temp 98.3  F (36.8  C) (Tympanic)   Wt 53.5 kg (118 lb)   Breastfeeding No   BMI 19.64 kg/m      Given the limitations on using estrogen, we discussed surgical options including Hysteroscopic biopsy, ablation and hysterectomy.  Discussed endometrial ablation.  Discussed hysteroscopy and how it is performed.  Explained the outpatient nature and post-operative recovery.  Discussed the success rate including 50% amenorrhea, 40% improvement and 10% failure.  Discussed the need for permanent birth control. She is using vasectomy for birth control.  Discussed the need for sampling of the endometrium prior to the ablation.  Discussed the option of an in-office ENDOMETRIAL BIOPSY versus hysteroscopic directed biopsy done immediately prior to the ablation.  She does understand that we would not have the results of that biopsy back prior to the ablation.  If the results were to show a pre-malignant or malignant process, she understands that she would have to come back at a later date for a hysterectomy.  She prefers intraoperative for endometrial sampling.    Reviewed the risks, benefits, and alternatives of hysterectomy including but not limited to bleeding, infection, injury to bowel,bladder and other structures.  The patient is aware that hysterectomy will render her sterile and unable to have further children.   We discussed the different routes of surgery including abdominal, vaginal, and laparoscopic and the possibility that the route of surgery may change during the procedure.  We discussed both total and supracervical hysterectomy and the benefits and contraindications involved.  We discussed ovarian sparing as well as oophrectomy. Reviewed pre and post op course. Patient was given the opportunity to ask questions and have them answered. The patient is unsure how she wants to proceed.    .    A/P:   Mavis presents with (N92.1) Menometrorrhagia  (primary encounter diagnosis)  Comment: Out of 30 minutes spent face to face with the patient, > 50% of this was spent in consultation.    Plan: Hysteroscopy with myosure biopsy, vs hysteroscopy with ablation, vs TOTAL LAPAROSCOPIC HYSTERECTOMY BILATERAL SALPINGECTOMY         -    No orders of the defined types were placed in this encounter.        Ezra Sapp MD FACOG

## 2019-12-26 NOTE — PATIENT INSTRUCTIONS
If you have any questions regarding your visit, Please contact your care team.    [facility]: [phone]      Women s Health CLINIC HOURS TELEPHONE NUMBER   MD Meg Santoyo CMA Lisa -    MATTIE Rangel       Monday:       7:30-4:30 [place]  Wednesday:       7:30-4:30 [place]  Thursday:       7:30-1:30 [place]  Friday:       7:30-11:30 [facility]  [address] [address] [place]  [address]  [phone] ask for [facility]  [address] [place] [address]  [phone] ask for [facility]    Imaging Scheduling for [place]:  [phone]    Imaging Scheduling for [place]: [phone]       Urgent Care locations:    [facility] Saturday and Sunday   9 am - 5 pm    Monday-Friday   12 pm - 8 pm  Saturday and Sunday   9 am - 5 pm   [phone]     [facility] Labor and Delivery:  [phone]    If you need a medication refill, please contact your pharmacy. Please allow 3 business days for your refill to be completed.  As always, Thank you for trusting us with your healthcare needs!

## 2020-01-02 ENCOUNTER — TELEPHONE (OUTPATIENT)
Dept: OBGYN | Facility: CLINIC | Age: 35
End: 2020-01-02

## 2020-01-02 NOTE — TELEPHONE ENCOUNTER
Surgery Pre-Certification    Medical Record Number: [id number]  Mavis Krishnamurthy  YOB: 1985   Phone: [phone] (home) [phone] (work)  Primary Provider: Leo Ceja    Reason for Admit:  menorrhagia    Surgeon: DANIELLE Sapp MD  Surgical Procedure: total laparoscopic hysterectomy, bilateral salpingectomy.  ICD-9 Coded: N 92.1  Date of Surgery: 02/11/2020  Consent signed? No    NEEDS TO BE SIGNED  Hospital: [facility]  Outpatient    Requestor:  Caterina Beck     Location:  [facility] [phone]    Surgery Scheduled    Date of Surgery 02/11/2020 Time of Surgery 10:30 AM  Type of Anesthesia Anticipated: General  Pre-Op: On 01/17/2020 with Edilia  at [place]   Post-Op:  2 weeks with Dr. Sapp  Pre-certification routed to Financial Counselors:  Yes    Surgery packet mailed to patient's home address: Yes  Patient instructed NPO 12 hours prior to surgery, arrive 1.5 hours prior to surgery, MUST have a .  Patient understood and agrees to the plan.
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
## 2020-01-17 ENCOUNTER — OFFICE VISIT (OUTPATIENT)
Dept: OBGYN | Facility: CLINIC | Age: 35
End: 2020-01-17
Payer: COMMERCIAL

## 2020-01-17 VITALS
SYSTOLIC BLOOD PRESSURE: 112 MMHG | WEIGHT: 120.2 LBS | TEMPERATURE: 98.2 F | HEIGHT: 65 IN | HEART RATE: 70 BPM | BODY MASS INDEX: 20.03 KG/M2 | DIASTOLIC BLOOD PRESSURE: 75 MMHG | OXYGEN SATURATION: 100 %

## 2020-01-17 DIAGNOSIS — N92.1 MENOMETRORRHAGIA: ICD-10-CM

## 2020-01-17 DIAGNOSIS — Z01.818 PREOP GENERAL PHYSICAL EXAM: Primary | ICD-10-CM

## 2020-01-17 PROCEDURE — 99214 OFFICE O/P EST MOD 30 MIN: CPT | Performed by: NURSE PRACTITIONER

## 2020-01-17 ASSESSMENT — MIFFLIN-ST. JEOR: SCORE: 1246.1

## 2020-01-17 ASSESSMENT — PAIN SCALES - GENERAL: PAINLEVEL: NO PAIN (0)

## 2020-01-27 ENCOUNTER — TELEPHONE (OUTPATIENT)
Dept: FAMILY MEDICINE | Facility: CLINIC | Age: 35
End: 2020-01-27

## 2020-01-27 NOTE — TELEPHONE ENCOUNTER
Reason for Call:  Other     Detailed comments: Noy from [facility] is calling to ask if clinic has received the paperwork for patient for her leave of absence. This was fax on Friday, if clinic hasn't received this please call.    Fax number is [phone]  Thank you       Phone Number Patient can be reached at: Cell number on file:    Telephone Information:   Mobile [phone]       Best Time:     Can we leave a detailed message on this number? YES    Call taken on 1/27/2020 at 11:25 AM by Ghada Foster

## 2020-01-28 NOTE — TELEPHONE ENCOUNTER
Asked an MA from [facility] to look to see if the forms had been received and she could find them anywhere  .   Called patient and left message to call back.     Please ask patient to send forms to the [facility] Fax number [phone].    Diane Rubin MA on 1/28/2020 at 9:42 AM

## 2020-01-29 ENCOUNTER — OFFICE VISIT (OUTPATIENT)
Dept: FAMILY MEDICINE | Facility: CLINIC | Age: 35
End: 2020-01-29
Payer: COMMERCIAL

## 2020-01-29 VITALS
WEIGHT: 120.6 LBS | DIASTOLIC BLOOD PRESSURE: 80 MMHG | HEART RATE: 83 BPM | RESPIRATION RATE: 14 BRPM | BODY MASS INDEX: 20.07 KG/M2 | TEMPERATURE: 98.5 F | SYSTOLIC BLOOD PRESSURE: 123 MMHG

## 2020-01-29 DIAGNOSIS — M79.661 PAIN IN BOTH LOWER LEGS: Primary | ICD-10-CM

## 2020-01-29 DIAGNOSIS — M79.662 PAIN IN BOTH LOWER LEGS: Primary | ICD-10-CM

## 2020-01-29 PROCEDURE — 99214 OFFICE O/P EST MOD 30 MIN: CPT | Performed by: FAMILY MEDICINE

## 2020-01-29 RX ORDER — ROPINIROLE 0.25 MG/1
0.25 TABLET, FILM COATED ORAL AT BEDTIME
Qty: 30 TABLET | Refills: 1 | Status: SHIPPED | OUTPATIENT
Start: 2020-01-29 | End: 2020-05-14

## 2020-01-29 ASSESSMENT — PAIN SCALES - GENERAL: PAINLEVEL: MODERATE PAIN (4)

## 2020-01-29 NOTE — PROGRESS NOTES
SUBJECTIVE:  35 year old.The patient has a complaint of leg pain.  This started 4-5 months ago. Location top and inner thigh pain quality burning Associated symptoms are possibly irregular bleeding.  Brought on by at night worse than during the day, sitting makes it worse and supine. .  Better with nothing and has tried heat and cold and icyhot.. ROS no loss of sensation, no swelling    Has had some lower lumbar pain in the middle. symptoms are staying the same.  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 . No masses, organomegaly   No hip pain      ICD-10-CM    1. Pain in both lower legs M79.661     M79.662     PLAN: Recheck one month

## 2020-01-30 NOTE — TELEPHONE ENCOUNTER
Spoke with patient and patient will send new copies to the  location at [phone].    Awaiting fax of forms.    Diane Rubin MA on 1/30/2020 at 10:00 AM

## 2020-01-30 NOTE — TELEPHONE ENCOUNTER
In provider box awaiting signature -Dr. Sapp is out of office- put into Dr. Mathis in basket  Also emailed to central back up   Caterina Beck M.A.

## 2020-02-05 ENCOUNTER — OFFICE VISIT (OUTPATIENT)
Dept: FAMILY MEDICINE | Facility: CLINIC | Age: 35
End: 2020-02-05
Payer: COMMERCIAL

## 2020-02-05 ENCOUNTER — TELEPHONE (OUTPATIENT)
Dept: FAMILY MEDICINE | Facility: CLINIC | Age: 35
End: 2020-02-05

## 2020-02-05 VITALS
DIASTOLIC BLOOD PRESSURE: 68 MMHG | OXYGEN SATURATION: 100 % | BODY MASS INDEX: 19.68 KG/M2 | HEART RATE: 69 BPM | WEIGHT: 118.25 LBS | SYSTOLIC BLOOD PRESSURE: 101 MMHG | TEMPERATURE: 98.1 F

## 2020-02-05 DIAGNOSIS — M79.605 LEG PAIN, BILATERAL: Primary | ICD-10-CM

## 2020-02-05 DIAGNOSIS — R58 ECCHYMOSIS: ICD-10-CM

## 2020-02-05 DIAGNOSIS — M79.604 LEG PAIN, BILATERAL: Primary | ICD-10-CM

## 2020-02-05 DIAGNOSIS — R79.0 LOW IRON STORES: Primary | ICD-10-CM

## 2020-02-05 LAB
ERYTHROCYTE [DISTWIDTH] IN BLOOD BY AUTOMATED COUNT: 13.8 % (ref 10–15)
FERRITIN SERPL-MCNC: 9 NG/ML (ref 12–150)
HCT VFR BLD AUTO: 39.7 % (ref 35–47)
HGB BLD-MCNC: 13.1 G/DL (ref 11.7–15.7)
MCH RBC QN AUTO: 29.2 PG (ref 26.5–33)
MCHC RBC AUTO-ENTMCNC: 33 G/DL (ref 31.5–36.5)
MCV RBC AUTO: 89 FL (ref 78–100)
PLATELET # BLD AUTO: 295 10E9/L (ref 150–450)
RBC # BLD AUTO: 4.48 10E12/L (ref 3.8–5.2)
WBC # BLD AUTO: 11.4 10E9/L (ref 4–11)

## 2020-02-05 PROCEDURE — 99214 OFFICE O/P EST MOD 30 MIN: CPT | Performed by: FAMILY MEDICINE

## 2020-02-05 PROCEDURE — 85027 COMPLETE CBC AUTOMATED: CPT | Performed by: FAMILY MEDICINE

## 2020-02-05 PROCEDURE — 82728 ASSAY OF FERRITIN: CPT | Performed by: FAMILY MEDICINE

## 2020-02-05 PROCEDURE — 36415 COLL VENOUS BLD VENIPUNCTURE: CPT | Performed by: FAMILY MEDICINE

## 2020-02-05 NOTE — PROGRESS NOTES
Chief complaint: thigh pain    Patient is scheduled for hysterectomy next week for dysfunctional bleeding    Patient has had pain in both upper thighs for 4 months  Burning both thighs   Worse at night or when not moving  Better when active or moving   Patient saw primary care provider a week ago and was told could be restless legs and was told will start meds after surgery  But however these pains are waking her up pain in the right upper thigh and sometimes left thigh  1-2 minute - random will happen 3 x a week  Happens at work or during the day as well    3 days ago had one in the left upper thigh  The next day got worse and felt a bump   This morning now looking like a big bruise but patient really doesn't remember injuring it     Occasional back pain  No loss of control of bowel or bladder, no numbness or weakness down the legs    Patient has bicuspid aortic valve follows with cardiology   Chest Pain or shortness of breath: No  Orthopnea, Edema, PND: No  Cough, colds, or respiratory symptoms: No  Abdominal Pain: No  Urinary symptoms or Flank Pain: No  Focal numbness or weakness: No  Rash: No  Fever or Chills: No  Weight loss: No  Poor Appetite: No  History of Thyroid problems/thyroid medication compliance: No  Headache or Neck stiffness: No  Joint Pain or Arthralgias: No  Melena/Hematochezia/Hematemesis: No  Depression or Mood changes: Yes: anxiety with surgery  No thoughts of harming self or others  Feels safe   No recent change in medications: No  Concern about recent tick-bite: No  Concern about recent travel/possible exposure: No    Problem list and histories reviewed & adjusted, as indicated.  Additional history: as documented    Problem list, Medication list, Allergies, and Medical/Social/Surgical histories reviewed in [facility] and updated as appropriate.    ROS:  Constitutional, HEENT, cardiovascular, pulmonary, GI, , musculoskeletal, neuro, skin, endocrine and psych systems are negative, except as  otherwise noted.    OBJECTIVE:                                                    /76   Pulse 69   Temp 98.1  F (36.7  C) (Tympanic)   Wt 53.6 kg (118 lb 4 oz)   LMP 01/09/2020 (Approximate)   SpO2 100%   BMI 19.68 kg/m    Body mass index is 19.68 kg/m .  GENERAL: healthy, alert and no distress  EYES: Eyes grossly normal to inspection, PERRL and conjunctivae and sclerae normal  HENT: ear canals and TM's normal, nose and mouth without ulcers or lesions  NECK: no adenopathy, no asymmetry, masses, or scars and thyroid normal to palpation  RESP: lungs clear to auscultation - no rales, rhonchi or wheezes  CV: regular rate and rhythm, normal S1 S2, no S3 or S4, no murmur, click or rub, no peripheral edema and peripheral pulses strong  ABDOMEN: soft, nontender, no hepatosplenomegaly, no masses and bowel sounds normal  MS: no gross musculoskeletal defects noted, no edema  No pain or tenderness  Left upper thigh area of ecchymosis faint and improving  But no swelling  Slight tenderness   SKIN: no suspicious lesions or rashes  NEURO: Normal strength and tone, mentation intact and speech normal  PSYCH: mentation appears normal, affect normal/bright    Diagnostic Test Results:  Results for orders placed or performed in visit on 02/05/20 (from the past 24 hour(s))   CBC with platelets   Result Value Ref Range    WBC 11.4 (H) 4.0 - 11.0 10e9/L    RBC Count 4.48 3.8 - 5.2 10e12/L    Hemoglobin 13.1 11.7 - 15.7 g/dL    Hematocrit 39.7 35.0 - 47.0 %    MCV 89 78 - 100 fl    MCH 29.2 26.5 - 33.0 pg    MCHC 33.0 31.5 - 36.5 g/dL    RDW 13.8 10.0 - 15.0 %    Platelet Count 295 150 - 450 10e9/L        ASSESSMENT/PLAN:                                                          ICD-10-CM    1. Leg pain, bilateral M79.604 Ferritin    M79.605 CBC with platelets   2. Ecchymosis R58      Leg pain for months - possible RLS  Will check cbc and ferritin levels   Ecchymosis in left leg - appears to be healing.  She cannot recall an  injury  Will check cbc  If persist recommend soft tissue MRI - however I really do not feel a lump  She declined additional testing for now and plans to just watch at this time   Patient instructions discussed with patient  Recommend follow up with primary care provider   Adverse reactions of medications discussed.  Aware to come back in if with worsening symptoms or if no relief despite treatment plan  Patient voiced understanding and had no further questions.     MD Heidy Todd MD  [facility]

## 2020-02-06 RX ORDER — FERROUS SULFATE 325(65) MG
325 TABLET ORAL
Qty: 30 TABLET | Refills: 0 | Status: SHIPPED | OUTPATIENT
Start: 2020-02-06 | End: 2020-05-14

## 2020-02-06 NOTE — TELEPHONE ENCOUNTER
Pt notified of provider message as written.  Pt verbalized good understanding.  Pt will start iron pills now.  Eusebia MONTEIRON, RN

## 2020-02-06 NOTE — TELEPHONE ENCOUNTER
"Please inform of [facility] message and additional recommendations:    \"Dear Mavis,  Your iron level is low.  I would recommend that you start iron supplements.  This can cause restless legs type of symptoms and in some cases treating this may improve your symptoms.  One of our nurses will reach out and talk to you.  Your white count is just minimally elevated 11.4 which can be seen in any viral infection or any type of inflammation.   Small deviations from normal also is not unexpected with lab testing.  I would just recommend that you follow up with your primary care provider in 2-4 weeks and have your iron and blood count rechecked then to make sure this is improving.  Take care  Heidy Hicks M.D. \"    To RN  1. I pended a prescription for iron. Please send to her pharmacy. Recommend she takes this with either vitamin C or orange juice for better absorption. Do not take with calcium supplements. May cause constipation and black stools.     2. Iron deficiency is likely from her heavy menstrual periods. Once she has her surgery - and her iron stores are improved - she may not necessarily need to be on iron long term. Close follow up with primary care provider recommended.     Thank you    Heidy Hicks M.D.     "

## 2020-02-11 ENCOUNTER — TELEPHONE (OUTPATIENT)
Dept: OBGYN | Facility: CLINIC | Age: 35
End: 2020-02-11

## 2020-02-11 NOTE — TELEPHONE ENCOUNTER
Reason for Call:  Form, our goal is to have forms completed with 72 hours, however, some forms may require a visit or additional information.    Type of letter, form or note:  disability    Who is the form from?: Insurance comp    Where did the form come from: form was faxed in    What clinic location was the form placed at?:[facility]     Where the form was placed: filled out signed-faxed and emailed to central back up       Additional comments: DONE    Call taken on 2/11/2020 at 12:22 PM by Caterina Beck

## 2020-02-27 ENCOUNTER — OFFICE VISIT (OUTPATIENT)
Dept: OBGYN | Facility: CLINIC | Age: 35
End: 2020-02-27
Payer: COMMERCIAL

## 2020-02-27 ENCOUNTER — MYC MEDICAL ADVICE (OUTPATIENT)
Dept: OBGYN | Facility: CLINIC | Age: 35
End: 2020-02-27

## 2020-02-27 VITALS
TEMPERATURE: 97.8 F | OXYGEN SATURATION: 100 % | WEIGHT: 114.4 LBS | DIASTOLIC BLOOD PRESSURE: 73 MMHG | HEART RATE: 96 BPM | SYSTOLIC BLOOD PRESSURE: 113 MMHG | BODY MASS INDEX: 19.04 KG/M2

## 2020-02-27 DIAGNOSIS — Z98.890 POST-OPERATIVE STATE: Primary | ICD-10-CM

## 2020-02-27 DIAGNOSIS — D62 ANEMIA DUE TO BLOOD LOSS, ACUTE: Primary | ICD-10-CM

## 2020-02-27 PROCEDURE — 99024 POSTOP FOLLOW-UP VISIT: CPT | Performed by: OBSTETRICS & GYNECOLOGY

## 2020-02-27 NOTE — PROGRESS NOTES
Mavis is a 35 year old  2 weeks s/p  TOTAL LAPAROSCOPIC HYSTERECTOMY complicated by no problems reported.  She is currently requiring nothing for pain management.  - vaginal bleeding, - hot flashes.  - GI/ complaints.  Energy level is Low.  Denies fever. She is suffering from a viral URI  Reviewed the pathology results Uterus, cervix and fallopian tubes, hysterectomy and bilateral salpingectomy -  1.  Secretory endometrium.  2.  Benign cervix and fallopian tubes with no pathologic alterations.  PE: There were no vitals taken for this visit.  Abd: soft, non tender, no masses  Incision: intact, no erythema, induration or discharge  vaginal cuff intact and non tender  Ext. Genitalia: Normal  Vagina:cuff healing, no lesions, Normal mucosa, no discharge  BME: non-tender    A/P 2 weeks s/p surgery, doing well from a surgical standpoint    1. Folllow up as needed, RETURN TO WORK   Ezra Sapp MD

## 2020-02-27 NOTE — TELEPHONE ENCOUNTER
"Per OV note 2020: \"Mavis is a 35 year old  2 weeks s/p  TOTAL LAPAROSCOPIC HYSTERECTOMY complicated by no problems reported.  She is currently requiring nothing for pain management.  - vaginal bleeding, - hot flashes.  - GI/ complaints.  Energy level is Low.  Denies fever. She is suffering from a viral URI  Reviewed the pathology results Uterus, cervix and fallopian tubes, hysterectomy and bilateral salpingectomy -  1.  Secretory endometrium.  2.  Benign cervix and fallopian tubes with no pathologic alterations.  PE: There were no vitals taken for this visit.  Abd: soft, non tender, no masses  Incision: intact, no erythema, induration or discharge  vaginal cuff intact and non tender  Ext. Genitalia: Normal  Vagina:cuff healing, no lesions, Normal mucosa, no discharge  BME: non-tender     A/P 2 weeks s/p surgery, doing well from a surgical standpoint\"    Aure Rolle RN on 2020 at 8:10 AM    "

## 2020-03-07 DIAGNOSIS — D62 ANEMIA DUE TO BLOOD LOSS, ACUTE: ICD-10-CM

## 2020-03-07 LAB
ERYTHROCYTE [DISTWIDTH] IN BLOOD BY AUTOMATED COUNT: 14.3 % (ref 10–15)
HCT VFR BLD AUTO: 34.7 % (ref 35–47)
HGB BLD-MCNC: 11.5 G/DL (ref 11.7–15.7)
MCH RBC QN AUTO: 29.7 PG (ref 26.5–33)
MCHC RBC AUTO-ENTMCNC: 33.1 G/DL (ref 31.5–36.5)
MCV RBC AUTO: 90 FL (ref 78–100)
PLATELET # BLD AUTO: 250 10E9/L (ref 150–450)
RBC # BLD AUTO: 3.87 10E12/L (ref 3.8–5.2)
WBC # BLD AUTO: 8.3 10E9/L (ref 4–11)

## 2020-03-07 PROCEDURE — 36415 COLL VENOUS BLD VENIPUNCTURE: CPT | Performed by: OBSTETRICS & GYNECOLOGY

## 2020-03-07 PROCEDURE — 85027 COMPLETE CBC AUTOMATED: CPT | Performed by: OBSTETRICS & GYNECOLOGY

## 2020-03-20 DIAGNOSIS — D50.0 IRON DEFICIENCY ANEMIA DUE TO CHRONIC BLOOD LOSS: ICD-10-CM

## 2020-03-20 LAB
FERRITIN SERPL-MCNC: 11 NG/ML (ref 12–150)
IRON SATN MFR SERPL: 32 % (ref 15–46)
IRON SERPL-MCNC: 117 UG/DL (ref 35–180)
TIBC SERPL-MCNC: 362 UG/DL (ref 240–430)

## 2020-03-20 PROCEDURE — 36415 COLL VENOUS BLD VENIPUNCTURE: CPT | Performed by: OBSTETRICS & GYNECOLOGY

## 2020-03-20 PROCEDURE — 82728 ASSAY OF FERRITIN: CPT | Performed by: OBSTETRICS & GYNECOLOGY

## 2020-03-20 PROCEDURE — 83550 IRON BINDING TEST: CPT | Performed by: OBSTETRICS & GYNECOLOGY

## 2020-03-20 PROCEDURE — 83540 ASSAY OF IRON: CPT | Performed by: OBSTETRICS & GYNECOLOGY

## 2020-05-14 ENCOUNTER — OFFICE VISIT (OUTPATIENT)
Dept: FAMILY MEDICINE | Facility: CLINIC | Age: 35
End: 2020-05-14
Payer: COMMERCIAL

## 2020-05-14 VITALS
TEMPERATURE: 99 F | SYSTOLIC BLOOD PRESSURE: 110 MMHG | WEIGHT: 119 LBS | OXYGEN SATURATION: 100 % | HEART RATE: 69 BPM | BODY MASS INDEX: 19.8 KG/M2 | RESPIRATION RATE: 14 BRPM | DIASTOLIC BLOOD PRESSURE: 72 MMHG

## 2020-05-14 DIAGNOSIS — N30.00 ACUTE CYSTITIS WITHOUT HEMATURIA: Primary | ICD-10-CM

## 2020-05-14 PROBLEM — N92.1 MENOMETRORRHAGIA: Status: RESOLVED | Noted: 2019-12-26 | Resolved: 2020-05-14

## 2020-05-14 LAB
ALBUMIN UR-MCNC: NEGATIVE MG/DL
APPEARANCE UR: CLEAR
BILIRUB UR QL STRIP: NEGATIVE
COLOR UR AUTO: YELLOW
GLUCOSE UR STRIP-MCNC: NEGATIVE MG/DL
HGB UR QL STRIP: NEGATIVE
KETONES UR STRIP-MCNC: NEGATIVE MG/DL
LEUKOCYTE ESTERASE UR QL STRIP: NEGATIVE
NITRATE UR QL: NEGATIVE
PH UR STRIP: 8 PH (ref 5–7)
SOURCE: ABNORMAL
SP GR UR STRIP: 1.01 (ref 1–1.03)
UROBILINOGEN UR STRIP-ACNC: 0.2 EU/DL (ref 0.2–1)

## 2020-05-14 PROCEDURE — 99214 OFFICE O/P EST MOD 30 MIN: CPT | Performed by: FAMILY MEDICINE

## 2020-05-14 PROCEDURE — 81003 URINALYSIS AUTO W/O SCOPE: CPT | Performed by: FAMILY MEDICINE

## 2020-05-14 PROCEDURE — 87086 URINE CULTURE/COLONY COUNT: CPT | Performed by: FAMILY MEDICINE

## 2020-05-14 RX ORDER — CIPROFLOXACIN 500 MG/1
500 TABLET, FILM COATED ORAL 2 TIMES DAILY
Qty: 14 TABLET | Refills: 0 | Status: SHIPPED | OUTPATIENT
Start: 2020-05-14 | End: 2020-07-29

## 2020-05-14 RX ORDER — PHENAZOPYRIDINE HYDROCHLORIDE 200 MG/1
200 TABLET, FILM COATED ORAL 3 TIMES DAILY PRN
Qty: 15 TABLET | Refills: 0 | Status: SHIPPED | OUTPATIENT
Start: 2020-05-14 | End: 2020-07-29

## 2020-05-14 ASSESSMENT — PAIN SCALES - GENERAL: PAINLEVEL: NO PAIN (0)

## 2020-05-14 NOTE — NURSING NOTE
"Chief Complaint   Patient presents with     Dysuria       Initial /72   Pulse 69   Temp 99  F (37.2  C) (Oral)   Resp 14   Wt 54 kg (119 lb)   LMP 01/09/2020 (Approximate)   SpO2 100%   BMI 19.80 kg/m   Estimated body mass index is 19.8 kg/m  as calculated from the following:    Height as of 1/17/20: 1.651 m (5' 5\").    Weight as of this encounter: 54 kg (119 lb).  Medication Reconciliation: complete  Gordon Caldwell, MAURICE    "

## 2020-05-14 NOTE — PATIENT INSTRUCTIONS
Await culture results, consider bladder spasm medication for overactive bladder if culture negative and symptoms persist.    Decrease caffeine use.
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
## 2020-05-15 ENCOUNTER — MYC MEDICAL ADVICE (OUTPATIENT)
Dept: FAMILY MEDICINE | Facility: CLINIC | Age: 35
End: 2020-05-15

## 2020-05-15 DIAGNOSIS — B37.31 YEAST INFECTION OF THE VAGINA: Primary | ICD-10-CM

## 2020-05-15 LAB
BACTERIA SPEC CULT: NO GROWTH
SPECIMEN SOURCE: NORMAL

## 2020-05-16 RX ORDER — FLUCONAZOLE 150 MG/1
TABLET ORAL
Qty: 2 TABLET | Refills: 0 | Status: SHIPPED | OUTPATIENT
Start: 2020-05-16 | End: 2020-07-29

## 2020-05-18 ENCOUNTER — OFFICE VISIT (OUTPATIENT)
Dept: FAMILY MEDICINE | Facility: CLINIC | Age: 35
End: 2020-05-18
Payer: COMMERCIAL

## 2020-05-18 VITALS
WEIGHT: 118.8 LBS | BODY MASS INDEX: 19.77 KG/M2 | HEART RATE: 64 BPM | SYSTOLIC BLOOD PRESSURE: 114 MMHG | RESPIRATION RATE: 14 BRPM | OXYGEN SATURATION: 100 % | TEMPERATURE: 98.6 F | DIASTOLIC BLOOD PRESSURE: 76 MMHG

## 2020-05-18 DIAGNOSIS — R39.15 URGENCY OF URINATION: Primary | ICD-10-CM

## 2020-05-18 LAB
ALBUMIN UR-MCNC: NEGATIVE MG/DL
APPEARANCE UR: CLEAR
BILIRUB UR QL STRIP: NEGATIVE
COLOR UR AUTO: YELLOW
GLUCOSE UR STRIP-MCNC: NEGATIVE MG/DL
HGB UR QL STRIP: NEGATIVE
KETONES UR STRIP-MCNC: NEGATIVE MG/DL
LEUKOCYTE ESTERASE UR QL STRIP: NEGATIVE
NITRATE UR QL: NEGATIVE
PH UR STRIP: 8 PH (ref 5–7)
SOURCE: ABNORMAL
SP GR UR STRIP: 1.01 (ref 1–1.03)
SPECIMEN SOURCE: NORMAL
UROBILINOGEN UR STRIP-ACNC: 0.2 EU/DL (ref 0.2–1)
WET PREP SPEC: NORMAL

## 2020-05-18 PROCEDURE — 99213 OFFICE O/P EST LOW 20 MIN: CPT | Performed by: FAMILY MEDICINE

## 2020-05-18 PROCEDURE — 81003 URINALYSIS AUTO W/O SCOPE: CPT | Performed by: FAMILY MEDICINE

## 2020-05-18 PROCEDURE — 87086 URINE CULTURE/COLONY COUNT: CPT | Performed by: FAMILY MEDICINE

## 2020-05-18 PROCEDURE — 87210 SMEAR WET MOUNT SALINE/INK: CPT | Performed by: FAMILY MEDICINE

## 2020-05-18 RX ORDER — OXYBUTYNIN CHLORIDE 5 MG/1
5 TABLET, EXTENDED RELEASE ORAL DAILY
Qty: 30 TABLET | Refills: 0 | Status: SHIPPED | OUTPATIENT
Start: 2020-05-18 | End: 2020-06-09

## 2020-05-18 ASSESSMENT — PAIN SCALES - GENERAL: PAINLEVEL: NO PAIN (0)

## 2020-05-18 NOTE — NURSING NOTE
"Chief Complaint   Patient presents with     RECHECK       Initial /76   Pulse 64   Temp 98.6  F (37  C) (Oral)   Resp 14   Wt 53.9 kg (118 lb 12.8 oz)   LMP 01/09/2020 (Approximate)   SpO2 100%   BMI 19.77 kg/m   Estimated body mass index is 19.77 kg/m  as calculated from the following:    Height as of 1/17/20: 1.651 m (5' 5\").    Weight as of this encounter: 53.9 kg (118 lb 12.8 oz).  Medication Reconciliation: complete  Gordon Caldwell CMA    " "Fever tonight at home 103  Given tylenol 8 and then ibuprofen 830  Also nebs given  Still with abd breathing  Not himself tonight c/o \"just can't do this\"   HAS ASTHMA TYPE SYMPTOMS WITH COLDS  Here for eval  "

## 2020-05-18 NOTE — PROGRESS NOTES
Subjective     Mavis Krishnamurthy is a 35 year old female who presents to clinic today for the following health issues:    HPI   Ongoing frequency       Duration: Ongoing frequency concerns     Description (location/character/radiation):  Utilizing benadryl to sleep due to urinary frequency due to symptoms being worse at night.     Intensity:  moderate    Accompanying signs and symptoms: none     History (similar episodes/previous evaluation): Follow up from 5/14/20 appointment     Precipitating or alleviating factors: was able to get sleep with use of benadryl.  The longer urine is held the better symptoms are    Therapies tried and outcome: antibiotics, diflucan, benadryl.       Recent ucx negative, pt stopped abx, took diflucan though no discharge.  Continues to have urgency/frequency, worse at night, was out doing yard work for 6 hours without a trip to bathroom  Does have high caffeine intake and does drink >64 oz of water daily.      Patient Active Problem List   Diagnosis     CARDIOVASCULAR SCREENING; LDL GOAL LESS THAN 160     Allergic rhinitis     IBS (irritable bowel syndrome)     Chronic maxillary sinusitis     Lactose intolerance     Migraine     Abnormal antinuclear antibody titer     Bicuspid aortic valve     Past Surgical History:   Procedure Laterality Date     COLONOSCOPY WITH CO2 INSUFFLATION N/A 6/18/2015    Procedure: COLONOSCOPY WITH CO2 INSUFFLATION;  Surgeon: Angel Thomas MD;  Location:  OR     ENDOSCOPIC BALLOON SINUPLASTY, OPTICAL TRACKING SYSTEM ENDOSCOPIC SINUS SURGERY, COMBINED  11/7/2013    Procedure: COMBINED ENDOSCOPIC BALLOON SINUPLASTY, OPTICAL TRACKING SYSTEM ENDOSCOPIC SINUS SURGERY;  Bilateral Endoscopic Ethmoidectomy, Maxillary Antrostomy, Frontal Sinusototmy with Navigation and Balloon and Propel Implants;  Surgeon: Vasquez Corona MD;  Location:  OR     ENDOSCOPY  10/2017    Of [place], [place] part [place] [facility]     EXAM OF VAGINA,COLPOSCOPY      X -2      LAPAROSCOPIC HYSTERECTOMY TOTAL, BILATERAL SALPINGO-OOPHORECTOMY, COMBINED  02/2020    menorrhagia,      MOUTH SURGERY  2009    [place] [facility]      PHOTOREFRACTIVE KERATECTOMY      Oct and Dec 2015     UPPER GI ENDOSCOPY         Social History     Tobacco Use     Smoking status: Never Smoker     Smokeless tobacco: Never Used   Substance Use Topics     Alcohol use: No     Family History   Problem Relation Age of Onset     Hypertension Mother      Heart Disease Father         open heart for endocarditis     Neurologic Disorder Father 16        migraines     Alzheimer Disease Maternal Grandmother 80     Neurologic Disorder Paternal Grandmother         migraines unknown age     Neurologic Disorder Sister 20        migraines         Current Outpatient Medications   Medication Sig Dispense Refill     fluconazole (DIFLUCAN) 150 MG tablet 1 tab by mouth, may repeat dose if symptoms persist after 1 week 2 tablet 0     oxybutynin ER (DITROPAN-XL) 5 MG 24 hr tablet Take 1 tablet (5 mg) by mouth daily 30 tablet 0     ciprofloxacin (CIPRO) 500 MG tablet Take 1 tablet (500 mg) by mouth 2 times daily (Patient not taking: Reported on 5/18/2020) 14 tablet 0     phenazopyridine (PYRIDIUM) 200 MG tablet Take 1 tablet (200 mg) by mouth 3 times daily as needed for irritation (Patient not taking: Reported on 5/18/2020) 15 tablet 0     BP Readings from Last 3 Encounters:   05/18/20 114/76   05/14/20 110/72   02/27/20 113/73    Wt Readings from Last 3 Encounters:   05/18/20 53.9 kg (118 lb 12.8 oz)   05/14/20 54 kg (119 lb)   02/27/20 51.9 kg (114 lb 6.4 oz)                    Reviewed and updated as needed this visit by Provider  Tobacco  Allergies  Meds  Problems  Med Hx  Surg Hx  Fam Hx         Review of Systems   Constitutional, HEENT, cardiovascular, pulmonary, gi and gu systems are negative, except as otherwise noted.      Objective    /76   Pulse 64   Temp 98.6  F (37  C) (Oral)   Resp 14   Wt 53.9 kg (118 lb 12.8 oz)    LMP 01/09/2020 (Approximate)   SpO2 100%   BMI 19.77 kg/m    Body mass index is 19.77 kg/m .  Physical Exam   GENERAL: healthy, alert and no distress  EYES: Eyes grossly normal to inspection, PERRL and conjunctivae and sclerae normal  ABDOMEN: soft, nontender, no hepatosplenomegaly, no masses and bowel sounds normal  MS: no gross musculoskeletal defects noted, no edema  SKIN: no suspicious lesions or rashes  PSYCH: mentation appears normal, affect normal/bright    Diagnostic Test Results:  Labs reviewed in Epic  UA RESULTS:  Recent Labs   Lab Test 05/18/20  1220  12/06/19  1550   COLOR Yellow   < > Yellow   APPEARANCE Clear   < > Clear   URINEGLC Negative   < > Negative   URINEBILI Negative   < > Negative   URINEKETONE Negative   < > Negative   SG 1.015   < > 1.020   UBLD Negative   < > Negative   URINEPH 8.0*   < > 6.5   PROTEIN Negative   < > Negative   UROBILINOGEN 0.2   < > 0.2   NITRITE Negative   < > Negative   LEUKEST Negative   < > Negative   RBCU  --   --  O - 2   WBCU  --   --  0 - 5    < > = values in this interval not displayed.    wet prep neg        Assessment & Plan     (R39.15) Urgency of urination  (primary encounter diagnosis)  Comment: continue urgency/frequency, seems worse when not distracted by tasks  Plan: *UA reflex to Microscopic and Culture ([facility]         [facility] and         [place]), Urine Culture Aerobic Bacterial, Wet         prep, oxybutynin ER (DITROPAN-XL) 5 MG 24 hr         tablet        Trial of oxybutynin 5 mg xl, Discussed potential side effects and actions to take if they occur. Try x 1 week then off to see if persistent, may be related to increased caffeine use.  Consider urology referral if symptoms persist.          Patient Instructions       Trial of oxybutynin for urgency, watch for side effects     Consider urology if still having symptoms despite meds.      Return in about 1 week (around 5/25/2020) for if not improved or symptoms  worsen.    Kellee Downing MD  [facility]

## 2020-05-18 NOTE — PATIENT INSTRUCTIONS
Trial of oxybutynin for urgency, watch for side effects     Consider urology if still having symptoms despite meds.

## 2020-05-19 LAB
BACTERIA SPEC CULT: NORMAL
SPECIMEN SOURCE: NORMAL

## 2020-06-09 ENCOUNTER — MYC MEDICAL ADVICE (OUTPATIENT)
Dept: FAMILY MEDICINE | Facility: CLINIC | Age: 35
End: 2020-06-09

## 2020-06-09 DIAGNOSIS — R39.15 URGENCY OF URINATION: ICD-10-CM

## 2020-06-09 RX ORDER — OXYBUTYNIN CHLORIDE 5 MG/1
5 TABLET, EXTENDED RELEASE ORAL DAILY
Qty: 90 TABLET | Refills: 1 | Status: SHIPPED | OUTPATIENT
Start: 2020-06-09 | End: 2020-07-29

## 2020-06-09 NOTE — TELEPHONE ENCOUNTER
Patient saw Dr Kellee Dwoning for urinary urgency and was started on a trial of oxybutynin 3 weeks ago.  Patient would like to continue taking it and is asking for refills.  Does she need to see urology or can she just get refills?    Lexy Mccurdy BSN, RN

## 2020-07-01 ENCOUNTER — TRANSFERRED RECORDS (OUTPATIENT)
Dept: HEALTH INFORMATION MANAGEMENT | Facility: CLINIC | Age: 35
End: 2020-07-01

## 2020-07-13 ENCOUNTER — VIRTUAL VISIT (OUTPATIENT)
Dept: FAMILY MEDICINE | Facility: CLINIC | Age: 35
End: 2020-07-13
Payer: COMMERCIAL

## 2020-07-13 DIAGNOSIS — J06.9 UPPER RESPIRATORY TRACT INFECTION, UNSPECIFIED TYPE: Primary | ICD-10-CM

## 2020-07-13 PROCEDURE — 99213 OFFICE O/P EST LOW 20 MIN: CPT | Mod: 95 | Performed by: FAMILY MEDICINE

## 2020-07-13 ASSESSMENT — ENCOUNTER SYMPTOMS
SORE THROAT: 1
COUGH: 1
VOMITING: 1

## 2020-07-13 NOTE — PATIENT INSTRUCTIONS
"Discharge Instructions for COVID-19 Patients  You have--or may have--COVID-19. Please follow the instructions listed below.   If you have a weakened immune system, discuss with your doctor any other actions you need to take.  How can I protect others?  If you have symptoms (fever, cough, body aches or trouble breathing):    Stay home and away from others (self-isolate) until:  ? At least 10 days have passed since your symptoms started. And   ? You've had no fever--and no medicine that reduces fever--for 3 full days (72 hours). And   ? Your other symptoms have resolved (gotten better).  If you don't show symptoms, but testing showed that you have COVID-19:    Stay home and away from others (self-isolate) until at least 10 days have passed since the date of your first positive COVID-19 test.  During this time    Stay in your own room, even for meals. Use your own bathroom if you can.    Stay away from others in your home. No hugging, kissing or shaking hands. No visitors.    Don't go to work, school or anywhere else.    Clean \"high touch\" surfaces often (doorknobs, counters, handles). Use household cleaning spray or wipes. You'll find a full list of  on the EPA website: www.epa.gov/pesticide-registration/list-n-disinfectants-use-against-sars-cov-2.    Cover your mouth and nose with a mask, tissue or wash cloth to avoid spreading germs.    Wash your hands and face often. Use soap and water.    Caregivers in these groups are at risk for severe illness due to COVID-19:  ? People 65 years and older  ? People who live in a nursing home or long-term care facility  ? People with chronic disease (lung, heart, cancer, diabetes, kidney, liver, immunologic)  ? People who have a weakened immune system, including those who:    Are in cancer treatment    Take medicine that weakens the immune system, such as corticosteroids    Had a bone marrow or organ transplant    Have an immune deficiency    Have poorly controlled HIV or " AIDS    Are obese (body mass index of 40 or higher)    Smoke regularly    Caregivers should wear gloves while washing dishes, handling laundry and cleaning bedrooms and bathrooms.    Use caution when washing and drying laundry: Don't shake dirty laundry and use the warmest water setting that you can.    For more tips on managing your health at home, go to www.cdc.gov/coronavirus/2019-ncov/downloads/10Things.pdf.  How can I take care of myself at home?  1. Get lots of rest. Drink extra fluids (unless a doctor has told you not to).  2. Take Tylenol (acetaminophen) for fever or pain. If you have liver or kidney problems, ask your family doctor if it's okay to take Tylenol.     Adults can take either:  ? 650 mg (two 325 mg pills) every 4 to 6 hours, or   ? 1,000 mg (two 500 mg pills) every 8 hours as needed.  ? Note: Don't take more than 3,000 mg in one day. Acetaminophen is found in many medicines (both prescribed and over-the-counter medicines). Read all labels to be sure you don't take too much.   For children, check the Tylenol bottle for the right dose. The dose is based on the child's age or weight.  3. If you have other health problems (like cancer, heart failure, an organ transplant or severe kidney disease): Call your specialty clinic if you don't feel better in the next 2 days.  4. Know when to call 911. Emergency warning signs include:  ? Trouble breathing or shortness of breath  ? Pain or pressure in the chest that doesn't go away  ? Feeling confused like you haven't felt before, or not being able to wake up  ? Bluish-colored lips or face  5. Your doctor may have prescribed a blood thinner medicine. Follow their instructions.  Where can I get more information?     [employer] [place] - About COVID-19:   [email or web address]    CDC - What to Do If You're Sick: www.cdc.gov/coronavirus/2019-ncov/about/steps-when-sick.html    CDC - Ending Home Isolation:  www.cdc.gov/coronavirus/2019-ncov/hcp/disposition-in-home-patients.html    CDC - Caring for Someone: www.cdc.gov/coronavirus/2019-ncov/if-you-are-sick/care-for-someone.html    [facility] - Interim Guidance for Hospital Discharge to Home: www.[facility].[place]/diseases/coronavirus/hcp/hospdischarge.pdf    [facility] clinical trials (COVID-19 research studies): clinicalaffairs.[facility]-clinical-trials    Below are the COVID-19 hotlines at the [place] Department of Health ([facility]). Interpreters are available.  ? For health questions: Call 026-084-4494 or 1-771.861.1086 (7 a.m. to 7 p.m.)  ? For questions about schools and childcare: Call 598-308-6716 or 1-938.920.4787 (7 a.m. to 7 p.m.)    For informational purposes only. Not to replace the advice of your health care provider. Clinically reviewed by the Infection Prevention Team.Copyright   2020 [facility]. All rights reserved. [employer] 079016 - 06/20.
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
## 2020-07-14 DIAGNOSIS — J06.9 UPPER RESPIRATORY TRACT INFECTION, UNSPECIFIED TYPE: ICD-10-CM

## 2020-07-14 LAB
DEPRECATED S PYO AG THROAT QL EIA: NEGATIVE
SPECIMEN SOURCE: NORMAL
SPECIMEN SOURCE: NORMAL
STREP GROUP A PCR: NOT DETECTED

## 2020-07-14 PROCEDURE — 40001204 ZZHCL STATISTIC STREP A RAPID: Performed by: FAMILY MEDICINE

## 2020-07-14 PROCEDURE — 87651 STREP A DNA AMP PROBE: CPT | Performed by: FAMILY MEDICINE

## 2020-07-14 PROCEDURE — U0003 INFECTIOUS AGENT DETECTION BY NUCLEIC ACID (DNA OR RNA); SEVERE ACUTE RESPIRATORY SYNDROME CORONAVIRUS 2 (SARS-COV-2) (CORONAVIRUS DISEASE [COVID-19]), AMPLIFIED PROBE TECHNIQUE, MAKING USE OF HIGH THROUGHPUT TECHNOLOGIES AS DESCRIBED BY CMS-2020-01-R: HCPCS | Performed by: FAMILY MEDICINE

## 2020-07-14 PROCEDURE — 99207 ZZC NO CHARGE LOS: CPT

## 2020-07-14 NOTE — PROGRESS NOTES
"Date: 2020 15:08:15  Clinician: Sander Foley  Clinician NPI: [id number]  Patient: Mavis Krishnamurthy  Patient : 1985  Patient Address: [address] [place] [address]  Patient Phone: [phone]  Visit Protocol: URI  Patient Summary:  Mavis is a 35 year old ( : 1985 ) female who initiated a Visit for COVID-19 (Coronavirus) evaluation and screening. When asked the question \"Please sign me up to receive news, health information and promotions. \", Mavis responded \"No\".    Mavis states her symptoms started 1-2 days ago.   Her symptoms consist of nausea, malaise, chills, a sore throat, and enlarged lymph nodes. Mavis also feels feverish.   Symptom details     Sore throat: Mavis reports having mild throat pain (1-3 on a 10 point pain scale), does not have exudate on her tonsils, and can swallow liquids. The lymph nodes in her neck are enlarged. A rash has not appeared on the skin since the sore throat started.     Temperature: Her current temperature is 100.2 degrees Fahrenheit. Mavis has had a temperature over 100 degrees Fahrenheit for 1-2 days.      Mavis denies having wheezing, teeth pain, ageusia, diarrhea, vomiting, rhinitis, ear pain, headache, myalgias, anosmia, facial pain or pressure, cough, and nasal congestion. She also denies having recent facial or sinus surgery in the past 60 days and taking antibiotic medication in the past month. She is not experiencing dyspnea.   Precipitating events  Mavis is not sure if she has been exposed to someone with strep throat. She has not recently been exposed to someone with influenza. Mavis has been in close contact with the following high risk individuals: children under the age of 5, people with asthma, heart disease or diabetes, immunocompromised people, and adults 65 or older.   Pertinent COVID-19 (Coronavirus) information  In the past 14 days, Mavis has not worked in a congregate living setting.   She does not work or volunteer " as healthcare worker or a  and does not work or volunteer in a healthcare facility.   Mavis also has not lived in a congregate living setting in the past 14 days. She does not live with a healthcare worker.   Mavis has not had a close contact with a laboratory-confirmed COVID-19 patient within 14 days of symptom onset.   Triage Point(s) temporarily suspended for COVID-19 (Coronavirus) screening  Mavis reported the following symptoms which were previously protocol referral points. These protocol referral points have temporarily been removed for purposes of COVID-19 (Coronavirus) screening.   Meets at least 3/5 centor score criteria     Swollen lymph nodes    Temp over 100    Absence of cough         Pertinent medical history  Mavis does not get yeast infections when she takes antibiotics.   Mavis does not need a return to work/school note.   Weight: 120 lbs   Mavis does not smoke or use smokeless tobacco.   She denies pregnancy and denies breastfeeding. She does not menstruate.   Weight: 120 lbs  Reason for repeat visit for the same protocol within 24 hours:  hit wrong response to swallow question  See the History of referred by protocol and completed visits section for details on previous visits (visits currently in queue to be diagnosed will not appear in this section).    MEDICATIONS: No current medications, ALLERGIES: Sulfa (Sulfonamide Antibiotics)  Clinician Response:  Dear Mavis,   Your symptoms show that you may have coronavirus (COVID-19). This illness can cause fever, cough and trouble breathing. Many people get a mild case and get better on their own. Some people can get very sick.  What should I do?  We would like to test you for this virus.   1. Please call [phone] to schedule your visit. Explain that you were referred by [facility] to have a COVID-19 test. Be ready to share your [facility] visit ID number.  The following will serve as your written order for this COVID Test, ordered by  "me, for the indication of suspected COVID [Z20.828]: The test will be ordered in [employer], our electronic health record, after you are scheduled. It will show as ordered and authorized by Kenny Laura MD.  Order: COVID-19 (Coronavirus) PCR [facility]TOMATIC [facility].      2. When it's time for your COVID test:  Stay at least 6 feet away from others. (If someone will drive you to your test, stay in the backseat, as far away from the  as you can.)   Cover your mouth and nose with a mask, tissue or washcloth.  Go straight to the testing site. Don't make any stops on the way there or back.      3.Starting now: Stay home and away from others (self-isolate) until:   You've had no fever---and no medicine that reduces fever---for 3 full days (72 hours). And...   Your other symptoms have gotten better. For example, your cough or breathing has improved. And...   At least 10 days have passed since your symptoms started.       During this time, don't leave the house except for testing or medical care.   Stay in your own room, even for meals. Use your own bathroom if you can.   Stay away from others in your home. No hugging, kissing or shaking hands. No visitors.  Don't go to work, school or anywhere else.    Clean \"high touch\" surfaces often (doorknobs, counters, handles, etc.). Use a household cleaning spray or wipes. You'll find a full list of  on the EPA website: www.epa.gov/pesticide-registration/list-n-disinfectants-use-against-sars-cov-2.   Cover your mouth and nose with a mask, tissue or washcloth to avoid spreading germs.  Wash your hands and face often. Use soap and water.  Caregivers in these groups are at risk for severe illness due to COVID-19:  o People 65 years and older  o People who live in a nursing home or long-term care facility  o People with chronic disease (lung, heart, cancer, diabetes, kidney, liver, immunologic)  o People who have a weakened immune system, including those who:   Are in " cancer treatment  Take medicine that weakens the immune system, such as corticosteroids  Had a bone marrow or organ transplant  Have an immune deficiency  Have poorly controlled HIV or AIDS  Are obese (body mass index of 40 or higher)  Smoke regularly   o Caregivers should wear gloves while washing dishes, handling laundry and cleaning bedrooms and bathrooms.  o Use caution when washing and drying laundry: Don't shake dirty laundry, and use the warmest water setting that you can.  o For more tips, go to www.cdc.gov/coronavirus/2019-ncov/downloads/10Things.pdf.    4.Sign up for [employer]. We know it's scary to hear that you might have COVID-19. We want to track your symptoms to make sure you're okay over the next 2 weeks. Please look for an email from [employer]---this is a free, online program that we'll use to keep in touch. To sign up, follow the link in the email. Learn more at [email or web address]  How can I take care of myself?   Get lots of rest. Drink extra fluids (unless a doctor has told you not to).   Take Tylenol (acetaminophen) for fever or pain. If you have liver or kidney problems, ask your family doctor if it's okay to take Tylenol.   Adults can take either:    650 mg (two 325 mg pills) every 4 to 6 hours, or...   1,000 mg (two 500 mg pills) every 8 hours as needed.    Note: Don't take more than 3,000 mg in one day. Acetaminophen is found in many medicines (both prescribed and over-the-counter medicines). Read all labels to be sure you don't take too much.   For children, check the Tylenol bottle for the right dose. The dose is based on the child's age or weight.    If you have other health problems (like cancer, heart failure, an organ transplant or severe kidney disease): Call your specialty clinic if you don't feel better in the next 2 days.       Know when to call 911. Emergency warning signs include:    Trouble breathing or shortness of breath Pain or pressure in the chest that  doesn't go away Feeling confused like you haven't felt before, or not being able to wake up Bluish-colored lips or face.  Where can I get more information?   [facility] -- About COVID-19: [email or web address]   CDC -- What to Do If You're Sick: www.cdc.gov/coronavirus/2019-ncov/about/steps-when-sick.html   CDC -- Ending Home Isolation: www.cdc.gov/coronavirus/2019-ncov/hcp/disposition-in-home-patients.html   [facility] -- Caring for Someone: www.cdc.gov/coronavirus/2019-ncov/if-you-are-sick/care-for-someone.html   [facility] -- Interim Guidance for Hospital Discharge to Home: www.health.[facility].mn./diseases/coronavirus/hcp/hospdischarge.pdf   [facility] clinical trials (COVID-19 research studies): clinicalaffairs.[facility]-clinical-trials    Below are the COVID-19 hotlines at the [facility] of Health ([facility]). Interpreters are available.    For health questions: Call 041-190-8701 or 1-660.834.2333 (7 a.m. to 7 p.m.) For questions about schools and childcare: Call 248-068-0306 or 1-782.500.5154 (7 a.m. to 7 p.m.)       [place] Strep Test    I am sorry you are not feeling well. Your strep test has . Based on the information provided, it is possible that you could have strep throat. When left untreated, strep can spread to other areas of the body and cause a more serious infection.  A strep test is the only way to determine if a bacterial infection or a virus is causing your sore throat. This is done in a lab where a swab of the back of your throat is collected tested for the bacteria that causes strep.  Since you chose not to use the lab order, please be seen:     In a clinic or urgent care    Within 24 hours     Call 911 or go to the emergency room if you suddenly develop a rash, are drooling or unable to swallow fluids, if you are having difficulty breathing, or feel that your throat is closing off.   Diagnosis: Pain in throat  Diagnosis ICD: R07.0  [employer] Results: [facility]  Strep Test -   [employer] Secondary Results: null

## 2020-07-15 LAB
SARS-COV-2 RNA SPEC QL NAA+PROBE: NOT DETECTED
SPECIMEN SOURCE: NORMAL

## 2020-07-20 ENCOUNTER — MYC MEDICAL ADVICE (OUTPATIENT)
Dept: FAMILY MEDICINE | Facility: CLINIC | Age: 35
End: 2020-07-20

## 2020-07-20 NOTE — TELEPHONE ENCOUNTER
Dr. Ceja has not seen pt in years.  Pt seen for this issue in [facility] by Dr. Starr.    I will send message to him for advise on next step.  Eusebia MONTEIRON, RN

## 2020-07-29 ENCOUNTER — OFFICE VISIT (OUTPATIENT)
Dept: FAMILY MEDICINE | Facility: CLINIC | Age: 35
End: 2020-07-29
Payer: COMMERCIAL

## 2020-07-29 VITALS
TEMPERATURE: 98.3 F | BODY MASS INDEX: 19.97 KG/M2 | SYSTOLIC BLOOD PRESSURE: 106 MMHG | HEART RATE: 65 BPM | DIASTOLIC BLOOD PRESSURE: 72 MMHG | HEIGHT: 64 IN | WEIGHT: 117 LBS | OXYGEN SATURATION: 100 %

## 2020-07-29 DIAGNOSIS — D50.9 IRON DEFICIENCY ANEMIA, UNSPECIFIED IRON DEFICIENCY ANEMIA TYPE: Primary | ICD-10-CM

## 2020-07-29 DIAGNOSIS — M79.662 PAIN OF LEFT LOWER LEG: ICD-10-CM

## 2020-07-29 DIAGNOSIS — M79.604 PAIN IN BOTH LOWER EXTREMITIES: ICD-10-CM

## 2020-07-29 DIAGNOSIS — M79.605 PAIN IN BOTH LOWER EXTREMITIES: ICD-10-CM

## 2020-07-29 LAB
ALBUMIN SERPL-MCNC: 4.4 G/DL (ref 3.4–5)
ALP SERPL-CCNC: 58 U/L (ref 40–150)
ALT SERPL W P-5'-P-CCNC: 20 U/L (ref 0–50)
ANION GAP SERPL CALCULATED.3IONS-SCNC: 7 MMOL/L (ref 3–14)
AST SERPL W P-5'-P-CCNC: 9 U/L (ref 0–45)
BASOPHILS # BLD AUTO: 0 10E9/L (ref 0–0.2)
BASOPHILS NFR BLD AUTO: 0.4 %
BILIRUB SERPL-MCNC: 0.3 MG/DL (ref 0.2–1.3)
BUN SERPL-MCNC: 13 MG/DL (ref 7–30)
CALCIUM SERPL-MCNC: 8.9 MG/DL (ref 8.5–10.1)
CHLORIDE SERPL-SCNC: 106 MMOL/L (ref 94–109)
CO2 SERPL-SCNC: 25 MMOL/L (ref 20–32)
CREAT SERPL-MCNC: 0.6 MG/DL (ref 0.52–1.04)
CRP SERPL-MCNC: <2.9 MG/L (ref 0–8)
DIFFERENTIAL METHOD BLD: NORMAL
EOSINOPHIL # BLD AUTO: 0.4 10E9/L (ref 0–0.7)
EOSINOPHIL NFR BLD AUTO: 3.4 %
ERYTHROCYTE [DISTWIDTH] IN BLOOD BY AUTOMATED COUNT: 13.1 % (ref 10–15)
ERYTHROCYTE [SEDIMENTATION RATE] IN BLOOD BY WESTERGREN METHOD: 8 MM/H (ref 0–20)
GFR SERPL CREATININE-BSD FRML MDRD: >90 ML/MIN/{1.73_M2}
GLUCOSE SERPL-MCNC: 92 MG/DL (ref 70–99)
HCT VFR BLD AUTO: 41.1 % (ref 35–47)
HGB BLD-MCNC: 14 G/DL (ref 11.7–15.7)
LYMPHOCYTES # BLD AUTO: 2.9 10E9/L (ref 0.8–5.3)
LYMPHOCYTES NFR BLD AUTO: 26.8 %
MCH RBC QN AUTO: 30.9 PG (ref 26.5–33)
MCHC RBC AUTO-ENTMCNC: 34.1 G/DL (ref 31.5–36.5)
MCV RBC AUTO: 91 FL (ref 78–100)
MONOCYTES # BLD AUTO: 0.7 10E9/L (ref 0–1.3)
MONOCYTES NFR BLD AUTO: 6.6 %
NEUTROPHILS # BLD AUTO: 6.7 10E9/L (ref 1.6–8.3)
NEUTROPHILS NFR BLD AUTO: 62.8 %
PLATELET # BLD AUTO: 261 10E9/L (ref 150–450)
POTASSIUM SERPL-SCNC: 3.8 MMOL/L (ref 3.4–5.3)
PROT SERPL-MCNC: 8.5 G/DL (ref 6.8–8.8)
RBC # BLD AUTO: 4.53 10E12/L (ref 3.8–5.2)
SODIUM SERPL-SCNC: 138 MMOL/L (ref 133–144)
TSH SERPL DL<=0.005 MIU/L-ACNC: 0.7 MU/L (ref 0.4–4)
WBC # BLD AUTO: 10.6 10E9/L (ref 4–11)

## 2020-07-29 PROCEDURE — 82728 ASSAY OF FERRITIN: CPT | Performed by: FAMILY MEDICINE

## 2020-07-29 PROCEDURE — 86039 ANTINUCLEAR ANTIBODIES (ANA): CPT | Performed by: FAMILY MEDICINE

## 2020-07-29 PROCEDURE — 80050 GENERAL HEALTH PANEL: CPT | Performed by: FAMILY MEDICINE

## 2020-07-29 PROCEDURE — 86140 C-REACTIVE PROTEIN: CPT | Performed by: FAMILY MEDICINE

## 2020-07-29 PROCEDURE — 36415 COLL VENOUS BLD VENIPUNCTURE: CPT | Performed by: FAMILY MEDICINE

## 2020-07-29 PROCEDURE — 86431 RHEUMATOID FACTOR QUANT: CPT | Performed by: FAMILY MEDICINE

## 2020-07-29 PROCEDURE — 85652 RBC SED RATE AUTOMATED: CPT | Performed by: FAMILY MEDICINE

## 2020-07-29 PROCEDURE — 99214 OFFICE O/P EST MOD 30 MIN: CPT | Performed by: FAMILY MEDICINE

## 2020-07-29 PROCEDURE — 86038 ANTINUCLEAR ANTIBODIES: CPT | Performed by: FAMILY MEDICINE

## 2020-07-29 RX ORDER — ROPINIROLE 0.25 MG/1
TABLET, FILM COATED ORAL
Qty: 30 TABLET | Refills: 0 | Status: SHIPPED | OUTPATIENT
Start: 2020-07-29 | End: 2021-01-08

## 2020-07-29 ASSESSMENT — MIFFLIN-ST. JEOR: SCORE: 1215.71

## 2020-07-29 NOTE — PROGRESS NOTES
"SUBJECTIVE:  35 year old.The patient has a complaint of legs.  This started 9 months ago. Location   quality leg pain.  She had been treated Associated symptoms are toes well turn white but not fingers. .  Legs will be both day or night. .  Better with iron. 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   Cardiovascular: negative, PMI normal. No lifts, heaves, or thrills. RRR. No murmurs, clicks gallops or rub   Gastrointestinal: Abdomen soft, non-tender. BS normal. No masses, organomegaly       ICD-10-CM    1. Iron deficiency anemia, unspecified iron deficiency anemia type  D50.9 CBC with platelets and differential     Ferritin   2. Pain of left lower leg  M79.662 Comprehensive metabolic panel (BMP + Alb, Alk Phos, ALT, AST, Total. Bili, TP)     TSH with free T4 reflex     ESR: Erythrocyte sedimentation rate     CRP, inflammation     Rheumatoid factor     Anti Nuclear Beatriz IgG by IFA with Reflex     RHEUMATOLOGY REFERRAL    PLAN: await lab work and consider referral to Rheumatology     Over  half of theTotal time 25 minutes spent in cordination care. Discussed diagnoses, prognoses and treatment.   "

## 2020-07-30 LAB
ANA PAT SER IF-IMP: ABNORMAL
ANA SER QL IF: ABNORMAL
ANA TITR SER IF: ABNORMAL {TITER}
FERRITIN SERPL-MCNC: 15 NG/ML (ref 12–150)
RHEUMATOID FACT SER NEPH-ACNC: <7 IU/ML (ref 0–20)

## 2020-08-04 DIAGNOSIS — J01.01 ACUTE RECURRENT MAXILLARY SINUSITIS: Primary | ICD-10-CM

## 2020-08-04 RX ORDER — CEFDINIR 300 MG/1
300 CAPSULE ORAL 2 TIMES DAILY
Qty: 20 CAPSULE | Refills: 0 | Status: SHIPPED | OUTPATIENT
Start: 2020-08-04 | End: 2020-08-14

## 2020-08-25 ENCOUNTER — TRANSFERRED RECORDS (OUTPATIENT)
Dept: HEALTH INFORMATION MANAGEMENT | Facility: CLINIC | Age: 35
End: 2020-08-25
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
## 2020-09-17 ENCOUNTER — VIRTUAL VISIT (OUTPATIENT)
Dept: RHEUMATOLOGY | Facility: CLINIC | Age: 35
End: 2020-09-17
Payer: COMMERCIAL

## 2020-09-17 DIAGNOSIS — M79.604 PAIN IN BOTH LOWER EXTREMITIES: Primary | ICD-10-CM

## 2020-09-17 DIAGNOSIS — E61.1 IRON DEFICIENCY: ICD-10-CM

## 2020-09-17 DIAGNOSIS — M79.605 PAIN IN BOTH LOWER EXTREMITIES: Primary | ICD-10-CM

## 2020-09-17 PROCEDURE — 99243 OFF/OP CNSLTJ NEW/EST LOW 30: CPT | Mod: 95 | Performed by: INTERNAL MEDICINE

## 2020-10-04 ENCOUNTER — IMMUNIZATION (OUTPATIENT)
Dept: NURSING | Facility: CLINIC | Age: 35
End: 2020-10-04
Payer: COMMERCIAL

## 2020-10-04 DIAGNOSIS — Z23 NEED FOR PROPHYLACTIC VACCINATION AND INOCULATION AGAINST INFLUENZA: Primary | ICD-10-CM

## 2020-10-04 PROCEDURE — 90686 IIV4 VACC NO PRSV 0.5 ML IM: CPT

## 2020-10-04 PROCEDURE — 90471 IMMUNIZATION ADMIN: CPT

## 2020-10-05 DIAGNOSIS — D50.9 IRON DEFICIENCY ANEMIA, UNSPECIFIED IRON DEFICIENCY ANEMIA TYPE: ICD-10-CM

## 2020-10-05 PROCEDURE — 82728 ASSAY OF FERRITIN: CPT | Performed by: FAMILY MEDICINE

## 2020-10-05 PROCEDURE — 36415 COLL VENOUS BLD VENIPUNCTURE: CPT | Performed by: FAMILY MEDICINE

## 2020-10-06 LAB — FERRITIN SERPL-MCNC: 27 NG/ML (ref 12–150)

## 2020-10-07 ENCOUNTER — MYC MEDICAL ADVICE (OUTPATIENT)
Dept: FAMILY MEDICINE | Facility: CLINIC | Age: 35
End: 2020-10-07

## 2020-10-07 DIAGNOSIS — E61.1 LOW IRON: Primary | ICD-10-CM

## 2020-12-13 ENCOUNTER — HEALTH MAINTENANCE LETTER (OUTPATIENT)
Age: 35
End: 2020-12-13

## 2020-12-23 ENCOUNTER — TRANSFERRED RECORDS (OUTPATIENT)
Dept: HEALTH INFORMATION MANAGEMENT | Facility: CLINIC | Age: 35
End: 2020-12-23

## 2021-01-07 NOTE — PROGRESS NOTES
"  Assessment & Plan     Iron deficiency anemia due to chronic blood loss  - Ferritin    Mastalgia  Discussed her right axilla mass and breast symptoms. Plan evaluation with imaging-right diagnostic mammogram and ultrasound. Follow up based on results.   - MA Diagnostic Right w/Suraj; Future    Lymphadenopathy  See above. If labs and imaging normal and mass persists, we discussed follow up with primary care for additional evaluation. Warning signs to monitor for and report immediately discussed with patient and she verbalizes understanding.  - CBC with platelets and differential    AFSHAN Conteh CNP  [facility]     Mavis Krishnamurthy is a 35 year old who presents to clinic today for the following health issues:  HPI       Lump in armpit  Patient notice a mass deep in the right axilla a few weeks ago. Having significant heartburn and has been attributing symptoms possibly to that, Endoscopy done last month.  No increase in size, is tender but admits to frequent touching it. No recent respiratory symptoms, fever, night sweats, unexpected weight changes, fatigue. Has had intermittent right sided breast pain as well. Not a specific location, but generalized shooting pains and aching in the upper breast. No palpable breast masses, nipple discharge, skin changes. Stress levels low right now. History of hysterectomy about a year ago, ovaries intact.  Patient also asking for a ferritin check due to her history of anemia and low levels.    Review of Systems   Constitutional, HEENT, cardiovascular, pulmonary, gi and gu systems are negative, except as otherwise noted.      Objective    /76 (BP Location: Right arm, Patient Position: Sitting, Cuff Size: Adult Regular)   Pulse 85   Temp 96.4  F (35.8  C) (Tympanic)   Ht 1.626 m (5' 4\")   Wt 55.1 kg (121 lb 6.4 oz)   LMP 01/09/2020 (Approximate)   SpO2 100%   BMI 20.84 kg/m    Body mass index is 20.84 kg/m .  Physical Exam "   GENERAL: healthy, alert and no distress  NECK: no adenopathy, no asymmetry, masses, or scars and thyroid normal to palpation  BREAST: normal without masses, tenderness or nipple discharge and no palpable masses or adenopathy  CV: regular rate and rhythm, normal S1 S2, no S3 or S4, no murmur, click or rub, no peripheral edema and peripheral pulses strong  MS: no gross musculoskeletal defects noted, no edema  SKIN: no suspicious lesions or rashes  PSYCH: mentation appears normal, affect normal/bright  LYMPH: no cervical, supraclavicular, or inguinal adenopathy. No left axilla lymphadenopathy.   In the right axilla, fairly deep within the tissue is an approximately 2mm firm, immobile and tender mass. No visible skin erythema or edema.    CBC with platelets and differential  Order: [id number]  Status:  Final result   Visible to patient:  No (not released) Dx:  Lymphadenopathy    Ref Range & Units  8:08 AM    WBC 4.0 - 11.0 10e9/L 7.4     RBC Count 3.8 - 5.2 10e12/L 4.21     Hemoglobin 11.7 - 15.7 g/dL 13.0     Hematocrit 35.0 - 47.0 % 38.7     MCV 78 - 100 fl 92     MCH 26.5 - 33.0 pg 30.9     MCHC 31.5 - 36.5 g/dL 33.6     RDW 10.0 - 15.0 % 12.5     Platelet Count 150 - 450 10e9/L 231     % Neutrophils % 63.7     % Lymphocytes % 25.4     % Monocytes % 7.7     % Eosinophils % 2.8     % Basophils % 0.4     Absolute Neutrophil 1.6 - 8.3 10e9/L 4.7     Absolute Lymphocytes 0.8 - 5.3 10e9/L 1.9     Absolute Monocytes 0.0 - 1.3 10e9/L 0.6     Absolute Eosinophils 0.0 - 0.7 10e9/L 0.2     Absolute Basophils 0.0 - 0.2 10e9/L 0.0     Diff Method  Automated Method    Resulting Agency  [facility]

## 2021-01-08 ENCOUNTER — OFFICE VISIT (OUTPATIENT)
Dept: OBGYN | Facility: CLINIC | Age: 36
End: 2021-01-08
Payer: COMMERCIAL

## 2021-01-08 VITALS
SYSTOLIC BLOOD PRESSURE: 111 MMHG | WEIGHT: 121.4 LBS | DIASTOLIC BLOOD PRESSURE: 76 MMHG | HEIGHT: 64 IN | TEMPERATURE: 96.4 F | OXYGEN SATURATION: 100 % | BODY MASS INDEX: 20.73 KG/M2 | HEART RATE: 85 BPM

## 2021-01-08 DIAGNOSIS — R59.1 LYMPHADENOPATHY: ICD-10-CM

## 2021-01-08 DIAGNOSIS — N64.4 MASTALGIA: ICD-10-CM

## 2021-01-08 DIAGNOSIS — D50.0 IRON DEFICIENCY ANEMIA DUE TO CHRONIC BLOOD LOSS: Primary | ICD-10-CM

## 2021-01-08 LAB
BASOPHILS # BLD AUTO: 0 10E9/L (ref 0–0.2)
BASOPHILS NFR BLD AUTO: 0.4 %
DIFFERENTIAL METHOD BLD: NORMAL
EOSINOPHIL # BLD AUTO: 0.2 10E9/L (ref 0–0.7)
EOSINOPHIL NFR BLD AUTO: 2.8 %
ERYTHROCYTE [DISTWIDTH] IN BLOOD BY AUTOMATED COUNT: 12.5 % (ref 10–15)
ERYTHROCYTE [DISTWIDTH] IN BLOOD BY AUTOMATED COUNT: NORMAL % (ref 10–15)
FERRITIN SERPL-MCNC: 24 NG/ML (ref 12–150)
HCT VFR BLD AUTO: 38.7 % (ref 35–47)
HCT VFR BLD AUTO: NORMAL % (ref 35–47)
HGB BLD-MCNC: 13 G/DL (ref 11.7–15.7)
HGB BLD-MCNC: NORMAL G/DL (ref 11.7–15.7)
LYMPHOCYTES # BLD AUTO: 1.9 10E9/L (ref 0.8–5.3)
LYMPHOCYTES NFR BLD AUTO: 25.4 %
MCH RBC QN AUTO: 30.9 PG (ref 26.5–33)
MCH RBC QN AUTO: NORMAL PG (ref 26.5–33)
MCHC RBC AUTO-ENTMCNC: 33.6 G/DL (ref 31.5–36.5)
MCHC RBC AUTO-ENTMCNC: NORMAL G/DL (ref 31.5–36.5)
MCV RBC AUTO: 92 FL (ref 78–100)
MCV RBC AUTO: NORMAL FL (ref 78–100)
MONOCYTES # BLD AUTO: 0.6 10E9/L (ref 0–1.3)
MONOCYTES NFR BLD AUTO: 7.7 %
NEUTROPHILS # BLD AUTO: 4.7 10E9/L (ref 1.6–8.3)
NEUTROPHILS NFR BLD AUTO: 63.7 %
PLATELET # BLD AUTO: 231 10E9/L (ref 150–450)
PLATELET # BLD AUTO: NORMAL 10E9/L (ref 150–450)
RBC # BLD AUTO: 4.21 10E12/L (ref 3.8–5.2)
RBC # BLD AUTO: NORMAL 10E12/L (ref 3.8–5.2)
WBC # BLD AUTO: 7.4 10E9/L (ref 4–11)
WBC # BLD AUTO: NORMAL 10E9/L (ref 4–11)

## 2021-01-08 PROCEDURE — 82728 ASSAY OF FERRITIN: CPT | Performed by: NURSE PRACTITIONER

## 2021-01-08 PROCEDURE — 85025 COMPLETE CBC W/AUTO DIFF WBC: CPT | Performed by: NURSE PRACTITIONER

## 2021-01-08 PROCEDURE — 99213 OFFICE O/P EST LOW 20 MIN: CPT | Performed by: NURSE PRACTITIONER

## 2021-01-08 PROCEDURE — 36415 COLL VENOUS BLD VENIPUNCTURE: CPT | Performed by: NURSE PRACTITIONER

## 2021-01-08 RX ORDER — OMEPRAZOLE 40 MG/1
CAPSULE, DELAYED RELEASE ORAL
COMMUNITY
Start: 2020-12-23 | End: 2021-06-22

## 2021-01-08 ASSESSMENT — PAIN SCALES - GENERAL: PAINLEVEL: NO PAIN (0)

## 2021-01-08 ASSESSMENT — MIFFLIN-ST. JEOR: SCORE: 1235.67

## 2021-01-12 ENCOUNTER — TRANSFERRED RECORDS (OUTPATIENT)
Dept: HEALTH INFORMATION MANAGEMENT | Facility: CLINIC | Age: 36
End: 2021-01-12

## 2021-01-12 ENCOUNTER — ANCILLARY PROCEDURE (OUTPATIENT)
Dept: MAMMOGRAPHY | Facility: CLINIC | Age: 36
End: 2021-01-12
Attending: NURSE PRACTITIONER
Payer: COMMERCIAL

## 2021-01-12 ENCOUNTER — ANCILLARY PROCEDURE (OUTPATIENT)
Dept: ULTRASOUND IMAGING | Facility: CLINIC | Age: 36
End: 2021-01-12
Attending: NURSE PRACTITIONER
Payer: COMMERCIAL

## 2021-01-12 DIAGNOSIS — N64.4 MASTALGIA: ICD-10-CM

## 2021-01-12 DIAGNOSIS — R22.31 MASS OF RIGHT AXILLA: ICD-10-CM

## 2021-01-12 PROCEDURE — 76882 US LMTD JT/FCL EVL NVASC XTR: CPT | Mod: RT

## 2021-01-12 PROCEDURE — 77066 DX MAMMO INCL CAD BI: CPT

## 2021-01-12 PROCEDURE — G0279 TOMOSYNTHESIS, MAMMO: HCPCS

## 2021-02-10 ENCOUNTER — OFFICE VISIT (OUTPATIENT)
Dept: FAMILY MEDICINE | Facility: CLINIC | Age: 36
End: 2021-02-10
Payer: COMMERCIAL

## 2021-02-10 VITALS
BODY MASS INDEX: 20.66 KG/M2 | OXYGEN SATURATION: 98 % | HEIGHT: 64 IN | WEIGHT: 121 LBS | SYSTOLIC BLOOD PRESSURE: 115 MMHG | TEMPERATURE: 97.4 F | HEART RATE: 65 BPM | RESPIRATION RATE: 14 BRPM | DIASTOLIC BLOOD PRESSURE: 77 MMHG

## 2021-02-10 DIAGNOSIS — R07.89 CHEST WALL PAIN: Primary | ICD-10-CM

## 2021-02-10 DIAGNOSIS — E61.1 IRON DEFICIENCY: ICD-10-CM

## 2021-02-10 PROCEDURE — 99214 OFFICE O/P EST MOD 30 MIN: CPT | Performed by: FAMILY MEDICINE

## 2021-02-10 ASSESSMENT — MIFFLIN-ST. JEOR: SCORE: 1228.85

## 2021-02-10 NOTE — PROGRESS NOTES
"SUBJECTIVE:  36 year old.The patient has a complaint of right sided chest wall pain..  This started 3-4 months ago. She has had endoscopy 2 months ago normal.  She had mammogram and sonogram 1/12/21 Associated symptoms are nothing.  Brought on by certain positions will make it worse.   .  Better with on treadmill. ROS no cough, fever no nausea or vomiting      Reviewed health maintenance  Patient Active Problem List   Diagnosis     CARDIOVASCULAR SCREENING; LDL GOAL LESS THAN 160     Allergic rhinitis     IBS (irritable bowel syndrome)     Chronic maxillary sinusitis     Lactose intolerance     Migraine     Abnormal antinuclear antibody titer     Bicuspid aortic valve     Past Medical History:   Diagnosis Date     Abnormal Pap smear     resolved, colposcopy     Abnormal Pap smears     Mild dysplasia-2007     AR (allergic rhinitis)      Bicuspid aortic valve        OBJECTIVE:  no apparent distress  /77   Pulse 65   Temp 97.4  F (36.3  C) (Tympanic)   Resp 14   Ht 1.626 m (5' 4\")   Wt 54.9 kg (121 lb)   LMP 01/09/2020 (Approximate)   SpO2 98%   Breastfeeding No   BMI 20.77 kg/m    Full range of motion upper extremities with equal strength and motion  No lymphadenipathy at right axilla  No costochondral tenderness  LUNGS:  CTA B/L, no wheezing or crackles.   Cardiovascular: negative, PMI normal. No lifts, heaves, or thrills. RRR. No murmurs, clicks gallops or rub   Gastrointestinal: Abdomen soft, non-tender. BS normal. No masses, organomegaly       ICD-10-CM    1. Chest wall pain  R07.89    2. Iron deficiency  E61.1 **Ferritin FUTURE 2mo     Iron and iron binding capacity    PLAN: observation if worse Retun to clinic and if not Retun to clinic.intwo months      "

## 2021-03-02 ENCOUNTER — TRANSFERRED RECORDS (OUTPATIENT)
Dept: HEALTH INFORMATION MANAGEMENT | Facility: CLINIC | Age: 36
End: 2021-03-02

## 2021-03-29 ENCOUNTER — TRANSFERRED RECORDS (OUTPATIENT)
Dept: HEALTH INFORMATION MANAGEMENT | Facility: CLINIC | Age: 36
End: 2021-03-29

## 2021-06-21 NOTE — PROGRESS NOTES
Assessment & Plan     Pelvic pain in female  We discussed her symptoms and possible etiologies. Will check pelvic ultrasound to evaluate ovaries, but discussed cystocele and management options. Would like to try physical therapy first and referral entered. Patient is given an opportunity to ask questions and have them answered.  - US Pelvic Complete with Transvaginal; Future  - Wet prep  - Urine Culture Aerobic Bacterial  - UA with Microscopic    Cystocele, midline  See above  - PHYSICAL THERAPY REFERRAL; Future    {AFSHAN Conteh CNP  M [facility]    Arcelia Gamble is a 36 year old who presents for the following health issues     HPI     Pelvic Pain  Onset/Duration: 2 weeks ago  Description:   Character: Dull ache  Location: pelvic region  Radiation: low back  Intensity: mild  Progression of Symptoms:  same  Accompanying Signs & Symptoms:  Fever/Chills: no  Gas/Bloating: YES  Nausea: no  Vomitting: no  Diarrhea: no  Constipation: no  Dysuria or Hematuria: no  History:   Trauma: no  Previous similar pain: no  Previous tests done: none  Precipitating factors:   Does the pain change with:     Food: no    Bowel Movement: no    Urination: no   Other factors:  no  Therapies tried and outcome: Tylenol  Patient's last menstrual period was 01/09/2020 (approximate).      Symptoms initially began 2 weeks ago after jumping rope-started to have urinary urgency. Has Oxybutynin and took it for a few days and symptoms resolved. Recurred and now experiencing urgency along with frequency. Getting up 1-2 times at night to urinate-usually does not at all. Feels bloated, pressure very low in the pelvis-along with a burning sensation over her pubic bone. Some radiation of discomfort to lower back. Denies dysuria, hematuria, fever, nausea. Did pelvic floor physical therapy after her last delivery. Denies abnormal vaginal symptoms. Hysterectomy last year.    Review of Systems   Constitutional,  "HEENT, cardiovascular, pulmonary, gi and gu systems are negative, except as otherwise noted.      Objective    /77 (BP Location: Right arm, Patient Position: Sitting, Cuff Size: Adult Regular)   Pulse 76   Temp 97.4  F (36.3  C) (Tympanic)   Ht 1.626 m (5' 4\")   Wt 54.6 kg (120 lb 6.4 oz)   LMP 01/09/2020 (Approximate)   SpO2 100%   BMI 20.67 kg/m    Body mass index is 20.67 kg/m .  Physical Exam   GENERAL: healthy, alert and no distress  ABDOMEN: soft, nontender, no hepatosplenomegaly, no masses and bowel sounds normal   (female): normal female external genitalia, normal urethral meatus, vaginal mucosa pink normal post-hysterectomy exam without masses. Presence of suprapubic tenderness and midline cystocele with valsalva  MS: no gross musculoskeletal defects noted, no edema  SKIN: no suspicious lesions or rashes  PSYCH: mentation appears normal, affect normal/bright    Results for orders placed or performed in visit on 06/22/21 (from the past 24 hour(s))   Wet prep    Specimen: Vagina   Result Value Ref Range    Specimen Description Vagina     Wet Prep No Trichomonas seen     Wet Prep No clue cells seen     Wet Prep No yeast seen     Wet Prep Rare  WBC'S seen      UA with Microscopic   Result Value Ref Range    Color Urine Yellow     Appearance Urine Clear     Glucose Urine Negative NEG^Negative mg/dL    Bilirubin Urine Negative NEG^Negative    Ketones Urine Negative NEG^Negative mg/dL    Specific Gravity Urine <=1.005 1.003 - 1.035    pH Urine 6.0 5.0 - 7.0 pH    Protein Albumin Urine Negative NEG^Negative mg/dL    Urobilinogen Urine 0.2 0.2 - 1.0 EU/dL    Nitrite Urine Negative NEG^Negative    Blood Urine Negative NEG^Negative    Leukocyte Esterase Urine Negative NEG^Negative    Source Midstream Urine     WBC Urine 0 - 5 OTO5^0 - 5 /HPF    RBC Urine O - 2 OTO2^O - 2 /HPF       "

## 2021-06-22 ENCOUNTER — OFFICE VISIT (OUTPATIENT)
Dept: OBGYN | Facility: CLINIC | Age: 36
End: 2021-06-22
Payer: COMMERCIAL

## 2021-06-22 VITALS
HEIGHT: 64 IN | HEART RATE: 76 BPM | SYSTOLIC BLOOD PRESSURE: 120 MMHG | BODY MASS INDEX: 20.55 KG/M2 | TEMPERATURE: 97.4 F | WEIGHT: 120.4 LBS | DIASTOLIC BLOOD PRESSURE: 77 MMHG | OXYGEN SATURATION: 100 %

## 2021-06-22 DIAGNOSIS — N81.11 CYSTOCELE, MIDLINE: ICD-10-CM

## 2021-06-22 DIAGNOSIS — R10.2 PELVIC PAIN IN FEMALE: Primary | ICD-10-CM

## 2021-06-22 LAB
ALBUMIN UR-MCNC: NEGATIVE MG/DL
APPEARANCE UR: CLEAR
BILIRUB UR QL STRIP: NEGATIVE
COLOR UR AUTO: YELLOW
GLUCOSE UR STRIP-MCNC: NEGATIVE MG/DL
HGB UR QL STRIP: NEGATIVE
KETONES UR STRIP-MCNC: NEGATIVE MG/DL
LEUKOCYTE ESTERASE UR QL STRIP: NEGATIVE
NITRATE UR QL: NEGATIVE
PH UR STRIP: 6 PH (ref 5–7)
RBC #/AREA URNS AUTO: NORMAL /HPF
SOURCE: NORMAL
SP GR UR STRIP: <=1.005 (ref 1–1.03)
SPECIMEN SOURCE: NORMAL
UROBILINOGEN UR STRIP-ACNC: 0.2 EU/DL (ref 0.2–1)
WBC #/AREA URNS AUTO: NORMAL /HPF
WET PREP SPEC: NORMAL

## 2021-06-22 PROCEDURE — 81001 URINALYSIS AUTO W/SCOPE: CPT | Performed by: NURSE PRACTITIONER

## 2021-06-22 PROCEDURE — 99213 OFFICE O/P EST LOW 20 MIN: CPT | Performed by: NURSE PRACTITIONER

## 2021-06-22 PROCEDURE — 87086 URINE CULTURE/COLONY COUNT: CPT | Performed by: NURSE PRACTITIONER

## 2021-06-22 PROCEDURE — 87210 SMEAR WET MOUNT SALINE/INK: CPT | Performed by: NURSE PRACTITIONER

## 2021-06-22 ASSESSMENT — MIFFLIN-ST. JEOR: SCORE: 1226.13

## 2021-06-22 ASSESSMENT — PAIN SCALES - GENERAL: PAINLEVEL: MILD PAIN (3)

## 2021-06-23 LAB
BACTERIA SPEC CULT: NO GROWTH
Lab: NORMAL
SPECIMEN SOURCE: NORMAL

## 2021-07-05 ENCOUNTER — ANCILLARY PROCEDURE (OUTPATIENT)
Dept: ULTRASOUND IMAGING | Facility: CLINIC | Age: 36
End: 2021-07-05
Attending: NURSE PRACTITIONER
Payer: COMMERCIAL

## 2021-07-05 DIAGNOSIS — R10.2 PELVIC PAIN IN FEMALE: ICD-10-CM

## 2021-07-05 PROCEDURE — 76856 US EXAM PELVIC COMPLETE: CPT | Performed by: RADIOLOGY

## 2021-07-05 PROCEDURE — 76830 TRANSVAGINAL US NON-OB: CPT | Performed by: RADIOLOGY

## 2021-08-26 ENCOUNTER — E-VISIT (OUTPATIENT)
Dept: URGENT CARE | Facility: CLINIC | Age: 36
End: 2021-08-26
Payer: COMMERCIAL

## 2021-08-26 DIAGNOSIS — R05.9 COUGH: Primary | ICD-10-CM

## 2021-08-26 PROCEDURE — 99421 OL DIG E/M SVC 5-10 MIN: CPT | Performed by: EMERGENCY MEDICINE

## 2021-08-26 NOTE — PATIENT INSTRUCTIONS
"  Dear Mavis Krishnamurthy    Almost all causes of this coughing are viruses. I will order Covid testing on you. Take the Dayquil and Nyquil-type medications and let us know if you get worse or no better in one week.    ter reviewing your responses, I've been able to diagnose you with \"Bronchitis\" which is a common infection of your lungs that is nearly always caused by a virus. The virus causes swelling and irritation of the air passages of your lungs which leads to cough. The illness spreads from your nose and throat to your windpipe and airways. It is often called a \"chest cold\" and can last up to 2 weeks, but is not a serious illness. Exposure to cigarette smoke usually makes this significantly worse.      To treat bronchitis, the main thing to do is drink lots of fluids and rest. Cough medications over-the-counter such as mucinex, robitussin or \"cold and sinus\" medications can be helpful. Ibuprofen and Tylenol also help with fevers or aching feelings that you often have with this kind of illness. Do not take ibuprofen if you have kidney disease, stomach ulcers or allergy to aspirin.     Bronchitis is most often highly contagious as viruses are spread through the air or touch. Avoid contact with others who may become infected, particularly children, the elderly and those whose immune systems might be weak.     If your symptoms worsen, you develop chest pain or shortness of breath, fevers over 101, or are not improving in 5 days, please contact your primary care provider for an appointment or visit any of our convenient Walk-in Care or Urgent Care Centers to be seen which can be found on our website here.    Thanks again for choosing us as your health care partner,    David Hernandez MD  "

## 2021-09-03 ENCOUNTER — VIRTUAL VISIT (OUTPATIENT)
Dept: FAMILY MEDICINE | Facility: CLINIC | Age: 36
End: 2021-09-03
Payer: COMMERCIAL

## 2021-09-03 DIAGNOSIS — R05.9 COUGH: ICD-10-CM

## 2021-09-03 DIAGNOSIS — J32.9 SINUSITIS, UNSPECIFIED CHRONICITY, UNSPECIFIED LOCATION: Primary | ICD-10-CM

## 2021-09-03 PROCEDURE — 99213 OFFICE O/P EST LOW 20 MIN: CPT | Mod: 95 | Performed by: PHYSICIAN ASSISTANT

## 2021-09-03 RX ORDER — ALBUTEROL SULFATE 90 UG/1
2 AEROSOL, METERED RESPIRATORY (INHALATION) EVERY 6 HOURS
Qty: 18 G | Refills: 0 | Status: SHIPPED | OUTPATIENT
Start: 2021-09-03 | End: 2021-10-08
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
## 2021-09-11 ENCOUNTER — E-VISIT (OUTPATIENT)
Dept: URGENT CARE | Facility: URGENT CARE | Age: 36
End: 2021-09-11
Payer: COMMERCIAL

## 2021-09-11 DIAGNOSIS — B37.31 CANDIDAL VULVOVAGINITIS: Primary | ICD-10-CM

## 2021-09-11 PROCEDURE — 99422 OL DIG E/M SVC 11-20 MIN: CPT | Performed by: PREVENTIVE MEDICINE

## 2021-09-11 RX ORDER — FLUCONAZOLE 150 MG/1
150 TABLET ORAL ONCE
Qty: 1 TABLET | Refills: 0 | Status: SHIPPED | OUTPATIENT
Start: 2021-09-11 | End: 2021-09-11

## 2021-09-11 NOTE — PATIENT INSTRUCTIONS
Yeast Infection (Candida Vaginal Infection)    You have a Candida vaginal infection. This is also known as a yeast infection. It's most often caused by a type of yeast (fungus) called Candida. Candida are normally found in the vagina. But if they increase in number, this can lead to infection and cause symptoms.   Symptoms of a yeast infection can include:     Clumpy or thin, white discharge, which may look like cottage cheese    Itching or burning    Burning with urination  Certain factors can make a yeast infection more likely. These can include:     Taking certain medicines, such as antibiotics or birth control pills    Pregnancy    Diabetes    Weak immune system  A yeast infection is most often treated with antifungal medicine. This may be given as a vaginal cream or pills you take by mouth. Treatment may last for about 1 to 7 days. Women with severe or recurrent infections may need longer courses of treatment.   Home care    If you re prescribed medicine, be sure to use it as directed. Finish all of the medicine, even if your symptoms go away. Don t try to treat yourself using over-the-counter products without talking with your provider first. They will let you know if this is a good option for you.    Ask your provider what steps you can take to help reduce your risk of having a yeast infection in the future.    Follow-up care  Follow up with your healthcare provider, or as directed.   When to seek medical advice  Call your healthcare provider right away if:     You have a fever of 100.4 F (38 C) or higher, or as directed by your provider.    Your symptoms worsen, or they don t go away within a few days of starting treatment.    You have new pain in the lower belly or pelvic region.    You have side effects that bother you or a reaction to the cream or pills you re prescribed.    You or any partners you have sex with have new symptoms, such as a rash, joint pain, or sores.  Niharika last reviewed this  educational content on 7/1/2020 2000-2021 [employer]. All rights reserved. This information is not intended as a substitute for professional medical care. Always follow your healthcare professional's instructions.

## 2021-09-26 ENCOUNTER — HEALTH MAINTENANCE LETTER (OUTPATIENT)
Age: 36
End: 2021-09-26

## 2021-09-29 NOTE — PROGRESS NOTES
"    Assessment & Plan     Vulvar irritation  We had discussed her recent antibiotic and Diflucan use and her current symptoms. Discussed that if wet prep was negative, would send prescription for topical ointment to use to the external areas causing symptoms. Discussed use, length of treatment and follow up. Prescription sent.  - Wet preparation  - nystatin-triamcinolone (MYCOLOG) 507426-0.1 UNIT/GM-% external ointment; Apply topically 2 times daily To the affected area for 7-10 days    AFSHAN Conteh CNP  M [facility]    Arcelia Gamble is a 36 year old who presents for the following health issues     HPI     Vaginal Symptoms  Onset/Duration: 2 weeks ago  Description:  Vaginal Discharge: white   Itching (Pruritis): YES  Burning sensation:  YES  Odor: no  Accompanying Signs & Symptoms:  Urinary symptoms: no  Abdominal pain: no  Fever: no  History:   Sexually active: YES  New Partner: no  Possibility of Pregnancy:  no  Recent antibiotic use: YES  Previous vaginitis issues: YES  Precipitating or alleviating factors: None  Therapies tried and outcome: Diflucan    Patient was on antibiotics about a month ago for sinusitis. Developed symptoms of vaginitis while on it and was given prescription for Diflucan. That resolved her symptoms until 2 weeks ago. Not having mostly external vulvar itching, irritation, some burning. Not really much discharge at this time. No odor, urinary symptoms, pelvic pain, fever, nausea. No changes in products she is using. No STI concerns.    Review of Systems   Constitutional, HEENT, cardiovascular, pulmonary, gi and gu systems are negative, except as otherwise noted.      Objective    /79 (BP Location: Right arm, Patient Position: Sitting, Cuff Size: Adult Regular)   Pulse 102   Ht 1.626 m (5' 4\")   Wt 54.7 kg (120 lb 9.6 oz)   LMP 01/09/2020 (Approximate)   SpO2 97%   BMI 20.70 kg/m    Body mass index is 20.7 kg/m .  Physical Exam   GENERAL: " healthy, alert and no distress   (female): normal female external genitalia, normal urethral meatus , vaginal mucosa pink, moist, well rugated and vaginal discharge - scant and white  MS: no gross musculoskeletal defects noted, no edema  SKIN: no suspicious lesions or rashes  PSYCH: mentation appears normal, affect normal/bright    Results for orders placed or performed in visit on 10/01/21 (from the past 24 hour(s))   Wet preparation    Specimen: Vagina; Swab   Result Value Ref Range    Trichomonas Absent Absent    Yeast Absent Absent    Clue Cells Absent Absent    WBCs/high power field None None

## 2021-10-01 ENCOUNTER — OFFICE VISIT (OUTPATIENT)
Dept: OBGYN | Facility: CLINIC | Age: 36
End: 2021-10-01
Payer: COMMERCIAL

## 2021-10-01 VITALS
WEIGHT: 120.6 LBS | SYSTOLIC BLOOD PRESSURE: 115 MMHG | BODY MASS INDEX: 20.59 KG/M2 | DIASTOLIC BLOOD PRESSURE: 79 MMHG | HEIGHT: 64 IN | OXYGEN SATURATION: 97 % | HEART RATE: 102 BPM

## 2021-10-01 DIAGNOSIS — N90.89 VULVAR IRRITATION: Primary | ICD-10-CM

## 2021-10-01 LAB
CLUE CELLS: NORMAL
TRICHOMONAS, WET PREP: NORMAL
WBC'S/HIGH POWER FIELD, WET PREP: NORMAL
YEAST, WET PREP: NORMAL

## 2021-10-01 PROCEDURE — 99213 OFFICE O/P EST LOW 20 MIN: CPT | Performed by: NURSE PRACTITIONER

## 2021-10-01 PROCEDURE — 87210 SMEAR WET MOUNT SALINE/INK: CPT | Performed by: NURSE PRACTITIONER

## 2021-10-01 RX ORDER — NYSTATIN AND TRIAMCINOLONE ACETONIDE 100000; 1 [USP'U]/G; MG/G
OINTMENT TOPICAL 2 TIMES DAILY
Qty: 30 G | Refills: 0 | Status: SHIPPED | OUTPATIENT
Start: 2021-10-01 | End: 2021-11-05

## 2021-10-01 ASSESSMENT — MIFFLIN-ST. JEOR: SCORE: 1227.04

## 2021-10-01 ASSESSMENT — PAIN SCALES - GENERAL: PAINLEVEL: NO PAIN (0)

## 2021-10-08 ENCOUNTER — OFFICE VISIT (OUTPATIENT)
Dept: FAMILY MEDICINE | Facility: CLINIC | Age: 36
End: 2021-10-08
Payer: COMMERCIAL

## 2021-10-08 VITALS
TEMPERATURE: 98.1 F | RESPIRATION RATE: 16 BRPM | DIASTOLIC BLOOD PRESSURE: 71 MMHG | SYSTOLIC BLOOD PRESSURE: 115 MMHG | HEART RATE: 87 BPM | WEIGHT: 123 LBS | OXYGEN SATURATION: 97 % | HEIGHT: 64 IN | BODY MASS INDEX: 21 KG/M2

## 2021-10-08 DIAGNOSIS — J32.9 SINUSITIS, UNSPECIFIED CHRONICITY, UNSPECIFIED LOCATION: Primary | ICD-10-CM

## 2021-10-08 DIAGNOSIS — Z98.890 HX OF NASAL POLYPECTOMY: ICD-10-CM

## 2021-10-08 DIAGNOSIS — R05.9 COUGH: ICD-10-CM

## 2021-10-08 DIAGNOSIS — Z87.09 HX OF NASAL POLYPECTOMY: ICD-10-CM

## 2021-10-08 PROBLEM — Q39.8 ESOPHAGEAL INLET PATCH: Status: ACTIVE | Noted: 2021-10-08

## 2021-10-08 PROCEDURE — 99213 OFFICE O/P EST LOW 20 MIN: CPT | Performed by: PHYSICIAN ASSISTANT

## 2021-10-08 RX ORDER — ALBUTEROL SULFATE 90 UG/1
2 AEROSOL, METERED RESPIRATORY (INHALATION) EVERY 6 HOURS
Qty: 18 G | Refills: 0 | Status: SHIPPED | OUTPATIENT
Start: 2021-10-08 | End: 2021-10-26

## 2021-10-08 ASSESSMENT — PAIN SCALES - GENERAL: PAINLEVEL: NO PAIN (0)

## 2021-10-08 ASSESSMENT — MIFFLIN-ST. JEOR: SCORE: 1237.92

## 2021-10-08 NOTE — PROGRESS NOTES
"Assessment & Plan       ICD-10-CM    1. Sinusitis, unspecified chronicity, unspecified location  J32.9 albuterol (PROAIR HFA/PROVENTIL HFA/VENTOLIN HFA) 108 (90 Base) MCG/ACT inhaler     Otolaryngology Referral   2. Cough  R05.9 albuterol (PROAIR HFA/PROVENTIL HFA/VENTOLIN HFA) 108 (90 Base) MCG/ACT inhaler   3. Hx of nasal polypectomy  Z98.890     Z87.09    Talk to patient about her concerns at this point she has a prescription of omeprazole that she wants to try first to see if that helps some of her symptoms I think that is reasonable for the next 2 to 4 weeks.  If her symptoms persist we will have her follow-up with ENT for second opinion.    Return in about 4 weeks (around 11/5/2021) for recheck.    Sohan Flannery PA-C  [facility] FRANTZ Gamble is a 36 year old who presents for the following health issues     HPI     Pt was seen for a virtual visit on 9/3. Sinus symptoms are better but still have a lingering cough just at night and first thing when she wakes up. Here to get a refill on her inhaler as she lost it     Cough worse with laying down and early in the a.m.   Overall better but con't nasal drainage.   She has had a history of sinus surgery in the past where she had nasal polyps removed she stated it did help quite a bit.    History of GERD in the past.  She has had endoscopy done in the past a couple times.  She has a history of ablated esophageal inlet patch.  She does states there once while she does feel heartburn but not as bad as when she had the inlet patch.    Review of Systems   Constitutional, HEENT, cardiovascular, pulmonary, gi and gu systems are negative, except as otherwise noted.      Objective    /71   Pulse 87   Temp 98.1  F (36.7  C) (Tympanic)   Resp 16   Ht 1.626 m (5' 4\")   Wt 55.8 kg (123 lb)   LMP 01/09/2020 (Approximate)   SpO2 97%   BMI 21.11 kg/m    Body mass index is 21.11 kg/m .  Physical Exam   GENERAL: healthy, alert and " no distress  Head: Normocephalic, atraumatic.  Eyes: Conjunctiva clear, non icteric. PERRLA.  Ears: External ears and TMs normal BL.  Nasal congestion with posterior nasal drainage. no maxillary tenderness.  She has increased swelling in her left nare and possible polyp in her right.  Mouth / Throat: Normal dentition.  No oral lesions. Pharynx non erythematous, tonsils without hypertrophy.  Neck: Supple, no enlarged LN, trachea midline.   RESP: lungs clear to auscultation - no rales, rhonchi or wheezes  CV: regular rate and rhythm, normal S1 S2, no S3 or S4, no murmur, click or rub, no peripheral edema

## 2021-10-15 ENCOUNTER — OFFICE VISIT (OUTPATIENT)
Dept: FAMILY MEDICINE | Facility: CLINIC | Age: 36
End: 2021-10-15
Payer: COMMERCIAL

## 2021-10-15 ENCOUNTER — ANCILLARY PROCEDURE (OUTPATIENT)
Dept: GENERAL RADIOLOGY | Facility: CLINIC | Age: 36
End: 2021-10-15
Attending: PHYSICIAN ASSISTANT
Payer: COMMERCIAL

## 2021-10-15 VITALS
DIASTOLIC BLOOD PRESSURE: 75 MMHG | HEART RATE: 69 BPM | SYSTOLIC BLOOD PRESSURE: 115 MMHG | BODY MASS INDEX: 21.32 KG/M2 | OXYGEN SATURATION: 100 % | RESPIRATION RATE: 14 BRPM | TEMPERATURE: 98.4 F | WEIGHT: 124.2 LBS

## 2021-10-15 DIAGNOSIS — R07.9 CHEST PAIN, UNSPECIFIED TYPE: ICD-10-CM

## 2021-10-15 DIAGNOSIS — R07.9 CHEST PAIN, UNSPECIFIED TYPE: Primary | ICD-10-CM

## 2021-10-15 PROCEDURE — 99213 OFFICE O/P EST LOW 20 MIN: CPT | Performed by: PHYSICIAN ASSISTANT

## 2021-10-15 PROCEDURE — 71101 X-RAY EXAM UNILAT RIBS/CHEST: CPT | Mod: LT | Performed by: RADIOLOGY

## 2021-10-15 RX ORDER — CYCLOBENZAPRINE HCL 10 MG
5-10 TABLET ORAL 3 TIMES DAILY PRN
Qty: 25 TABLET | Refills: 0 | Status: SHIPPED | OUTPATIENT
Start: 2021-10-15 | End: 2022-02-02

## 2021-10-15 ASSESSMENT — PAIN SCALES - GENERAL: PAINLEVEL: EXTREME PAIN (8)

## 2021-10-15 NOTE — PATIENT INSTRUCTIONS
Melody Gamble,    Thank you for allowing [facility] to manage your care.    I am unsure of the cause of your symptoms, but your exam is consistent with musculoskeletal pain. We will see what our workup shows.     If you develop worsening/changing symptoms at any time, please call 911 or go to the emergency department for evaluation.    I ordered some lab work, please go to the laboratory to get your studies.    I ordered some xrays, please go to our radiology department to get your xrays.    I sent your prescriptions to your pharmacy.    For your pain, please use use Tylenol 650-1000mg every 6 hours.     Max acetaminophen (Tylenol) 4,000mg/24 hours    For severe pain not controlled by over the counter medications, please use cyclobenzaprine as prescribed. Do not use this medication while driving, operating machinery, with other sedating medications, or while drinking alcohol as it will make you drowsy.      Please allow 1-2 business days for our office to contact you in regards to your laboratory/radiological studies.  If not done so, I encourage you to login into [employer] ([email or web address] to review your results as well.     If you have any questions or concerns, please feel free to call us at [phone]    Sincerely,    Alexandr Dorsey PA-C    Did you know?      You can schedule a video visit for follow-up appointments as well as future appointments for certain conditions.  Please see the below link.     [email or web address]    If you have not already done so,  I encourage you to sign up for [employer] ([email or web address]).  This will allow you to review your results, securely communicate with a provider, and schedule virtual visits as well.      Patient Education     Uncertain Causes of Chest Pain    Chest pain can happen for a number of reasons. Sometimes the cause can't be determined. If your condition does not seem serious, and your  pain does not appear to be coming from your heart, your healthcare provider may recommend watching it closely. Sometimes the signs of a serious problem take more time to appear. Many problems not related to your heart can cause chest pain. These include:    Musculoskeletal. Costochondritis is an inflammation of the tissues around the ribs that can occur from trauma or overuse injuries, or a strain of the muscles of the chest wall    Respiratory. Pneumonia, collapsed lung (pneumothorax), or inflammation of the lining of the chest and lungs (pleurisy)    Gastrointestinal. Esophageal reflux, heartburn, ulcers, or gallbladder disease    Anxiety and panic disorders    Nerve compression and inflammation    Rare miscellaneous problems such as aortic aneurysm (a swelling of the large artery coming out of the heart) or pulmonary embolism (a blood clot in the lungs)  Home care  After your visit, follow these recommendations:    Rest today and avoid strenuous activity.    Take any prescribed medicine as directed.    Be aware of any recurrent chest pain and notice any changes  Follow-up care  Follow up with your healthcare provider if you do not start to feel better within 24 hours, or as advised.  Call 911  Call 911 if any of these occur:    A change in the type of pain: if it feels different, becomes more severe, lasts longer, or begins to spread into your shoulder, arm, neck, jaw or back    Shortness of breath or increased pain with breathing    Weakness, dizziness, or fainting    Rapid heart beat    Crushing sensation in your chest  When to seek medical advice  Call your healthcare provider right away if any of the following occur:    Cough with dark colored sputum (phlegm) or blood    Fever of 100.4 F (38 C) or higher, or as directed by your healthcare provider    Swelling, pain or redness in one leg  [employer] last reviewed this educational content on 5/1/2018 2000-2021 [employer]. All rights reserved.  This information is not intended as a substitute for professional medical care. Always follow your healthcare professional's instructions.           Patient Education     Costochondritis  Costochondritis is inflammation of a rib or the cartilage that connects a rib to your breastbone (sternum). It causes soreness, and may cause chest pain that can be sharp or aching or feel like pressure. The pain may get worse with deep breathing, movement, or exercise. In some cases, the pain is mistaken for a heart attack. But the condition is not serious. Read on to learn more about the condition and how it can be treated.     What causes costochondritis?  The cause is not fully known. It may happen after a chest injury, chest infection, or bout of coughing. Some physical activities may lead to costochondritis. Large-breasted women may be more likely to have the condition. Often, the cause is unknown.   Diagnosing costochondritis  There is no test for costochondritis. The condition is diagnosed by the symptoms you have. Your healthcare provider will give you a physical exam. He or she will ask you about your symptoms and examine your chest for pain. In some cases, tests are done to rule out more serious problems. These tests may include tests such as chest X-ray, CT scan, or an ECG.   Treating costochondritis  If a cause is found, treatment for that will likely relieve the problem. Costochondritis often goes away on its own. The course of the condition varies from person to person. It usually lasts from weeks to months. In some cases, mild symptoms continue for months to years. To ease symptoms:     Take medicine as directed. These relieve pain and swelling. Ibuprofen or other NSAIDs are often advised. In some cases, you may be given prescription medicine, such as muscle relaxants.    Don't do activities that put stress on your chest or spine.    Apply a heating pad (set to warm, not high heat) to your breastbone several times a  day.    Do stretching exercises as directed.  When to call the healthcare provider  Call the healthcare provider right away if you have any of these:    Pain that is not relieved by medicine    Shortness of breath    Lightheadedness, dizziness, or fainting    Feeling of irregular heartbeat or fast pulse  Anyone with chest pain should see a healthcare provider, especially older adults and people at risk for heart disease.   [employer] last reviewed this educational content on 2/1/2020 2000-2021 [employer]. All rights reserved. This information is not intended as a substitute for professional medical care. Always follow your healthcare professional's instructions.

## 2021-10-15 NOTE — PROGRESS NOTES
Assessment & Plan   Problem List Items Addressed This Visit     None      Visit Diagnoses     Chest pain, unspecified type    -  Primary    Relevant Medications    cyclobenzaprine (FLEXERIL) 10 MG tablet    diclofenac (VOLTAREN) 1 % topical gel    Other Relevant Orders    XR Ribs & Chest Left G/E 3 Views (Completed)         MEDICAL DECISION MAKING: I was concerned about this patient's chest pain and considered multiple causes including but not limited to the following.       PE:  Wells Criteria is low risk.  PERC negative. Unlikely PE.    Aortic Dissection: The onset of pain was neither sudden or constant. Reproducible.    Unlikely ACS given location and reproducibility. No risk factors.    CXR shows no pneumonia, pneumothorax, pleural effusion, edema, or other worrisome process.      IMPRESSION: Based on this patient's history, exam, and diagnostic results, the working diagnosis of this patient's pain is unclear but likely nonemergent.  Could be musculoskeletal pain versus costochondritis.  Plan to discharge with over-the-counter analgesics, rice/heat, muscle relaxants and follow-up with us in 2 weeks if not improving.     Complete history and physical exam as below. AF with normal VS.    DDx and Dx discussed with and explained to the pt to their satisfaction.  All questions were answered at this time. Pt expressed understanding of and agreement with this dx, tx, and plan. No further workup warranted and standard medication warnings given. I have given the patient a list of pertinent indications for re-evaluation. Will go to the Emergency Department if symptoms worsen or new concerning symptoms arise. Patient left in no apparent distress.     See Patient Instructions    Return in about 2 weeks (around 10/29/2021) for a recheck of your symptoms if not improving, or call 911/go to an ER anytime if worsening.    YESSICA Watson  [facility] CAITLYN Gamble is a 36 year old who  presents for the following health issues   HPI     Musculoskeletal problem/pain  At a wedding 6 days ago and developed right sided chest pain with coughing and laying on it.   Onset/Duration: 10/9/21  Description  Location: ribs - left. Back, left side, low. Maybe from coughing a lot  Joint Swelling: no  Redness: no  Pain: YES- tender  Warmth: no  Intensity:  Moderate,   Progression of Symptoms:  worsening and constant  Accompanying signs and symptoms:   Fevers: no  Numbness/tingling/weakness: no  History  Trauma to the area: no  Recent illness:  no  Previous similar problem: no  Previous evaluation:  no  Precipitating or alleviating factors:  Aggravating factors include: moving, laying down  Therapies tried and outcome: heat pads, ice, Tylenol    Review of Systems   Constitutional, HEENT, cardiovascular, pulmonary, gi and gu systems are negative, except as otherwise noted.      Objective    /75   Pulse 69   Temp 98.4  F (36.9  C) (Tympanic)   Resp 14   Wt 56.3 kg (124 lb 3.2 oz)   LMP 01/09/2020 (Approximate)   SpO2 100%   BMI 21.32 kg/m    Body mass index is 21.32 kg/m .  Physical Exam  Vitals and nursing note reviewed.   Constitutional:       General: She is not in acute distress.     Appearance: She is not ill-appearing or diaphoretic.   HENT:      Head: Normocephalic and atraumatic.      Mouth/Throat:      Mouth: Mucous membranes are moist.   Eyes:      Conjunctiva/sclera: Conjunctivae normal.   Cardiovascular:      Rate and Rhythm: Normal rate and regular rhythm.      Heart sounds: Normal heart sounds. No murmur heard.   No friction rub. No gallop.       Comments: 2+ symmetric radial/PT pulses. No LE edema or tenderness.  Pulmonary:      Effort: Pulmonary effort is normal. No respiratory distress.      Breath sounds: Normal breath sounds. No stridor. No wheezing, rhonchi or rales.   Chest:      Chest wall: Tenderness (exquisite tenderness to the floating ribs on the left., no overlying signs of  trauma or infection) present.   Abdominal:      General: Bowel sounds are normal. There is no distension.      Palpations: Abdomen is soft. There is no mass.      Tenderness: There is no abdominal tenderness. There is no guarding or rebound.      Hernia: No hernia is present.   Skin:     General: Skin is warm and dry.   Neurological:      General: No focal deficit present.      Mental Status: She is alert. Mental status is at baseline.   Psychiatric:         Mood and Affect: Mood normal.         Behavior: Behavior normal.          Results for orders placed or performed in visit on 10/15/21   XR Ribs & Chest Left G/E 3 Views     Status: None    Narrative    XR RIBS & CHEST LT 3VW 10/15/2021 9:30 AM     HISTORY: Chest pain, unspecified type      Impression    IMPRESSION: No apparent left rib displaced fracture. The lungs appear  clear. No apparent pneumothorax or pleural effusion.    JOSE IYER MD         SYSTEM ID:  [id number]

## 2021-10-18 ENCOUNTER — TELEPHONE (OUTPATIENT)
Dept: FAMILY MEDICINE | Facility: CLINIC | Age: 36
End: 2021-10-18

## 2021-10-18 NOTE — TELEPHONE ENCOUNTER
Prior Authorization Specialty Medication Request    Medication/Dose: diclofenac (VOLTAREN) 1 % topical gel  ICD code (if different than what is on RX): [R07.9]  Previously Tried and Failed: NA    Insurance Name: [facility]  Insurance ID: [id number]  Insurance Phone Number: NA    Pharmacy Information (if different than what is on RX)  Name: [id number] [place], [place] - [address]  Phone: [phone]

## 2021-10-20 NOTE — TELEPHONE ENCOUNTER
PA Initiation    Medication: Diclofenac (VOLTAREN) 1 % topical gel -INITIATED  Insurance Company:  [employer]  Pharmacy Filling the Rx: [id number] [place] [facility] [place] [place] - [address]   Filling Pharmacy Phone: [phone]  Filling Pharmacy Fax: [phone]  Start Date: 10/20/2021

## 2021-10-25 DIAGNOSIS — J32.9 SINUSITIS, UNSPECIFIED CHRONICITY, UNSPECIFIED LOCATION: ICD-10-CM

## 2021-10-25 DIAGNOSIS — R05.9 COUGH: ICD-10-CM

## 2021-10-26 RX ORDER — ALBUTEROL SULFATE 90 UG/1
2 AEROSOL, METERED RESPIRATORY (INHALATION) EVERY 6 HOURS
Qty: 1 G | Refills: 1 | Status: SHIPPED | OUTPATIENT
Start: 2021-10-26 | End: 2022-02-02

## 2021-11-05 ENCOUNTER — OFFICE VISIT (OUTPATIENT)
Dept: FAMILY MEDICINE | Facility: CLINIC | Age: 36
End: 2021-11-05
Payer: COMMERCIAL

## 2021-11-05 ENCOUNTER — MYC MEDICAL ADVICE (OUTPATIENT)
Dept: FAMILY MEDICINE | Facility: CLINIC | Age: 36
End: 2021-11-05

## 2021-11-05 VITALS
OXYGEN SATURATION: 99 % | RESPIRATION RATE: 14 BRPM | HEART RATE: 76 BPM | BODY MASS INDEX: 21.52 KG/M2 | TEMPERATURE: 97.6 F | WEIGHT: 125.4 LBS | DIASTOLIC BLOOD PRESSURE: 74 MMHG | SYSTOLIC BLOOD PRESSURE: 107 MMHG

## 2021-11-05 DIAGNOSIS — R10.9 LEFT FLANK PAIN: Primary | ICD-10-CM

## 2021-11-05 LAB
ALBUMIN SERPL-MCNC: 4.1 G/DL (ref 3.4–5)
ALBUMIN UR-MCNC: NEGATIVE MG/DL
ALP SERPL-CCNC: 62 U/L (ref 40–150)
ALT SERPL W P-5'-P-CCNC: 24 U/L (ref 0–50)
ANION GAP SERPL CALCULATED.3IONS-SCNC: 4 MMOL/L (ref 3–14)
APPEARANCE UR: CLEAR
AST SERPL W P-5'-P-CCNC: 12 U/L (ref 0–45)
BILIRUB SERPL-MCNC: 0.5 MG/DL (ref 0.2–1.3)
BILIRUB UR QL STRIP: NEGATIVE
BUN SERPL-MCNC: 12 MG/DL (ref 7–30)
CALCIUM SERPL-MCNC: 9.2 MG/DL (ref 8.5–10.1)
CHLORIDE BLD-SCNC: 103 MMOL/L (ref 94–109)
CO2 SERPL-SCNC: 30 MMOL/L (ref 20–32)
COLOR UR AUTO: YELLOW
CREAT SERPL-MCNC: 0.66 MG/DL (ref 0.52–1.04)
ERYTHROCYTE [DISTWIDTH] IN BLOOD BY AUTOMATED COUNT: 12.9 % (ref 10–15)
GFR SERPL CREATININE-BSD FRML MDRD: >90 ML/MIN/1.73M2
GLUCOSE BLD-MCNC: 96 MG/DL (ref 70–99)
GLUCOSE UR STRIP-MCNC: NEGATIVE MG/DL
HCT VFR BLD AUTO: 40.5 % (ref 35–47)
HGB BLD-MCNC: 13.5 G/DL (ref 11.7–15.7)
HGB UR QL STRIP: NEGATIVE
KETONES UR STRIP-MCNC: NEGATIVE MG/DL
LEUKOCYTE ESTERASE UR QL STRIP: NEGATIVE
LIPASE SERPL-CCNC: 164 U/L (ref 73–393)
MCH RBC QN AUTO: 30.9 PG (ref 26.5–33)
MCHC RBC AUTO-ENTMCNC: 33.3 G/DL (ref 31.5–36.5)
MCV RBC AUTO: 93 FL (ref 78–100)
NITRATE UR QL: NEGATIVE
PH UR STRIP: 8.5 [PH] (ref 5–7)
PLATELET # BLD AUTO: 255 10E3/UL (ref 150–450)
POTASSIUM BLD-SCNC: 4.1 MMOL/L (ref 3.4–5.3)
PROT SERPL-MCNC: 8 G/DL (ref 6.8–8.8)
RBC # BLD AUTO: 4.37 10E6/UL (ref 3.8–5.2)
SODIUM SERPL-SCNC: 137 MMOL/L (ref 133–144)
SP GR UR STRIP: 1.01 (ref 1–1.03)
UROBILINOGEN UR STRIP-ACNC: 0.2 E.U./DL
WBC # BLD AUTO: 6.5 10E3/UL (ref 4–11)

## 2021-11-05 PROCEDURE — 85027 COMPLETE CBC AUTOMATED: CPT | Performed by: PHYSICIAN ASSISTANT

## 2021-11-05 PROCEDURE — 81003 URINALYSIS AUTO W/O SCOPE: CPT | Performed by: PHYSICIAN ASSISTANT

## 2021-11-05 PROCEDURE — 36415 COLL VENOUS BLD VENIPUNCTURE: CPT | Performed by: PHYSICIAN ASSISTANT

## 2021-11-05 PROCEDURE — 80053 COMPREHEN METABOLIC PANEL: CPT | Performed by: PHYSICIAN ASSISTANT

## 2021-11-05 PROCEDURE — 83690 ASSAY OF LIPASE: CPT | Performed by: PHYSICIAN ASSISTANT

## 2021-11-05 PROCEDURE — 99213 OFFICE O/P EST LOW 20 MIN: CPT | Performed by: PHYSICIAN ASSISTANT

## 2021-11-05 ASSESSMENT — PAIN SCALES - GENERAL: PAINLEVEL: MODERATE PAIN (4)

## 2021-11-05 NOTE — PROGRESS NOTES
Assessment & Plan   Problem List Items Addressed This Visit     None      Visit Diagnoses     Left flank pain    -  Primary    Relevant Orders    CBC with platelets    Comprehensive metabolic panel (BMP + Alb, Alk Phos, ALT, AST, Total. Bili, TP)    Lipase    UA Macro with Reflex to Micro and Culture - lab collect        Her left flank pain is most likely musculoskeletal given reproducibility on exam and changes with position.  Overall, this is improving.  We will obtain blood work and urinalysis today, will have low suspicion for pyelonephritis, kidney stones, or other abdominal/pelvic process.  She is PERC rule negative and low risk for PE based on Wells criteria.  She will be discharged with continued over-the-counter analgesics, Voltaren gel, tizanidine for breakthrough pain/spasm, rice/heat and follow-up with us in 2-3 weeks if not improving.    Complete history and physical exam as below. AF with normal VS.    DDx and Dx discussed with and explained to the pt to their satisfaction.  All questions were answered at this time. Pt expressed understanding of and agreement with this dx, tx, and plan. No further workup warranted and standard medication warnings given. I have given the patient a list of pertinent indications for re-evaluation. Will go to the Emergency Department if symptoms worsen or new concerning symptoms arise. Patient left in no apparent distress.     21 minutes spent on the date of the encounter doing chart review, history and exam, documentation and further activities per the note     See Patient Instructions    Return in about 3 weeks (around 11/26/2021) for a recheck of your symptoms if not improving, or call 911/go to an ER anytime if worsening.    YESSICA Watson  [facility] CAITLYN Gamble is a 36 year old who presents for the following health issues     HPI     Flank pain  Left flank pain still there. Still painful. Better then it was. Still painful to  lay on left side or back. Voltaren seems to help the most and muscle relaxant was not helpful. Worst at night. No longer pleuritic and she has not sob, hemoptysis, lightheadedness, or bowel/bladder symptoms. Overall, it has gotten much better. Currently 3/10 and non-radiating.    Review of Systems   Constitutional, HEENT, cardiovascular, pulmonary, gi and gu systems are negative, except as otherwise noted.      Objective    /74   Pulse 76   Temp 97.6  F (36.4  C) (Tympanic)   Resp 14   Wt 56.9 kg (125 lb 6.4 oz)   LMP 01/09/2020 (Approximate)   SpO2 99%   BMI 21.52 kg/m    Body mass index is 21.52 kg/m .  Physical Exam  Vitals and nursing note reviewed.   Constitutional:       General: She is not in acute distress.     Appearance: She is not ill-appearing or diaphoretic.   HENT:      Head: Normocephalic and atraumatic.      Mouth/Throat:      Mouth: Mucous membranes are moist.   Eyes:      Conjunctiva/sclera: Conjunctivae normal.   Cardiovascular:      Rate and Rhythm: Normal rate and regular rhythm.      Heart sounds: No murmur heard.   No friction rub. No gallop.    Pulmonary:      Effort: Pulmonary effort is normal. No respiratory distress.      Breath sounds: Normal breath sounds. No stridor. No wheezing, rhonchi or rales.   Abdominal:      General: Bowel sounds are normal. There is no distension.      Palpations: Abdomen is soft. There is no mass.      Tenderness: There is no abdominal tenderness. There is no guarding or rebound.      Hernia: No hernia is present.   Musculoskeletal:      Comments: Left lateral flank at the mid axillary line is tender and reproduces her pain.  No overlying signs of trauma or infection.     Skin:     General: Skin is warm and dry.   Neurological:      General: No focal deficit present.      Mental Status: She is alert. Mental status is at baseline.   Psychiatric:         Mood and Affect: Mood normal.         Behavior: Behavior normal.          Labs pending

## 2021-11-05 NOTE — PATIENT INSTRUCTIONS
Melody Gamble,    Thank you for allowing [facility] to manage your care.    I am unsure of the cause of your symptoms, but your exam is reassuring. We will see what our workup shows.     If you develop worsening/changing symptoms at any time, please call 911 or go to the emergency department for evaluation.    I ordered some lab work, please go to the laboratory to get your studies.    I sent your prescriptions to your pharmacy.    For your pain, please use Ibuprofen 400mg four times daily with food. Between ibuprofen doses, you may use Tylenol 650mg.     Max acetaminophen (Tylenol) 4,000mg/24 hours  Max ibuprofen 3,200mg/24 hours    For severe pain not controlled by over the counter medications, please use tizanidine as prescribed. Do not use this medication while driving, operating machinery, with other sedating medications, or while drinking alcohol as it will make you drowsy.    Please allow 1-2 business days for our office to contact you in regards to your laboratory/radiological studies.  If not done so, I encourage you to login into [employer] ([email or web address] to review your results as well.     If you have any questions or concerns, please feel free to call us at [phone]    Sincerely,    Alexandr Dorsey PA-C    Did you know?      You can schedule a video visit for follow-up appointments as well as future appointments for certain conditions.  Please see the below link.     [email or web address]    If you have not already done so,  I encourage you to sign up for [employer] ([email or web address]).  This will allow you to review your results, securely communicate with a provider, and schedule virtual visits as well.      Patient Education     Flank Pain with Uncertain Cause    The flank is the area between your upper belly (abdomen) and your back. Pain there is often caused by a problem with your kidneys. It might be a kidney infection  or a kidney stone. Other causes of flank pain include spinal arthritis, a pinched nerve from a back injury, a rib injury, a bruise, a back muscle strain, inflammation, or spasm.  The cause of your flank pain is not certain. You may need other tests.  Home care  Follow these tips when caring for yourself at home:    You may use acetaminophen or ibuprofen to control pain, unless your healthcare provider prescribed another medicine. If you have chronic liver or kidney disease, talk with your provider before taking these medicines. Also talk with your provider first if you ve ever had a stomach ulcer or digestive bleeding.    If the pain is coming from your muscles, you may get relief with ice or heat. During the first 2 days after the injury, put an ice pack on the painful area for 20 minutes every 2 to 4 hours. This will reduce swelling and pain. A hot shower, hot bath, or heating pad works well for a muscle spasm. You can start with ice, then switch to heat after 2 days. You might find that alternating ice and heat works well. Use the method that feels the best to you.  Follow-up care  Follow up with your healthcare provider if your symptoms don t get better over the next few days.  When to seek medical advice  Call your healthcare provider right away if any of these happen:    Repeated vomiting    Fever of 100.4 F (38 C) or higher, or as directed by your healthcare provider    Flank pain that gets worse    Pain that spreads to the front of your belly (abdomen)    Dizziness, weakness, or fainting    Blood in your urine    Burning feeling when you urinate or the need to urinate often    Pain in one of your legs that gets worse    Numbness or weakness in a leg  [employer] last reviewed this educational content on 10/1/2019    3218-9946 [employer]. All rights reserved. This information is not intended as a substitute for professional medical care. Always follow your healthcare professional's  instructions.

## 2021-12-01 ENCOUNTER — TRANSFERRED RECORDS (OUTPATIENT)
Dept: HEALTH INFORMATION MANAGEMENT | Facility: CLINIC | Age: 36
End: 2021-12-01
Payer: COMMERCIAL

## 2021-12-06 ENCOUNTER — LAB (OUTPATIENT)
Dept: LAB | Facility: CLINIC | Age: 36
End: 2021-12-06
Payer: COMMERCIAL

## 2021-12-06 DIAGNOSIS — R10.9 LEFT FLANK PAIN: Primary | ICD-10-CM

## 2021-12-06 DIAGNOSIS — R10.9 LEFT FLANK PAIN: ICD-10-CM

## 2021-12-06 LAB — D DIMER PPP FEU-MCNC: 0.61 UG/ML FEU (ref 0–0.5)

## 2021-12-06 PROCEDURE — 85379 FIBRIN DEGRADATION QUANT: CPT

## 2021-12-06 PROCEDURE — 36415 COLL VENOUS BLD VENIPUNCTURE: CPT

## 2021-12-07 ENCOUNTER — ANCILLARY PROCEDURE (OUTPATIENT)
Dept: CT IMAGING | Facility: CLINIC | Age: 36
End: 2021-12-07
Attending: PHYSICIAN ASSISTANT
Payer: COMMERCIAL

## 2021-12-07 DIAGNOSIS — R10.9 LEFT FLANK PAIN: ICD-10-CM

## 2021-12-07 DIAGNOSIS — R59.0 LYMPHADENOPATHY, THORACIC: Primary | ICD-10-CM

## 2021-12-07 PROCEDURE — 74177 CT ABD & PELVIS W/CONTRAST: CPT | Mod: TC | Performed by: RADIOLOGY

## 2021-12-07 PROCEDURE — 71275 CT ANGIOGRAPHY CHEST: CPT | Mod: TC | Performed by: RADIOLOGY

## 2021-12-07 RX ORDER — IOPAMIDOL 755 MG/ML
56 INJECTION, SOLUTION INTRAVASCULAR ONCE
Status: COMPLETED | OUTPATIENT
Start: 2021-12-07 | End: 2021-12-07

## 2021-12-07 RX ADMIN — IOPAMIDOL 56 ML: 755 INJECTION, SOLUTION INTRAVASCULAR at 14:01

## 2021-12-12 ENCOUNTER — MYC MEDICAL ADVICE (OUTPATIENT)
Dept: FAMILY MEDICINE | Facility: CLINIC | Age: 36
End: 2021-12-12
Payer: COMMERCIAL

## 2021-12-29 ENCOUNTER — TRANSFERRED RECORDS (OUTPATIENT)
Dept: HEALTH INFORMATION MANAGEMENT | Facility: CLINIC | Age: 36
End: 2021-12-29
Payer: COMMERCIAL

## 2021-12-29 LAB — TSH SERPL-ACNC: 0.27 UIU/ML (ref 0.45–4.5)

## 2021-12-30 ENCOUNTER — MYC MEDICAL ADVICE (OUTPATIENT)
Dept: FAMILY MEDICINE | Facility: CLINIC | Age: 36
End: 2021-12-30
Payer: COMMERCIAL

## 2021-12-31 NOTE — TELEPHONE ENCOUNTER
We can do a video visit in next couple weeks and I'll set-up lab at the time. Can I get records for chart too. Leo Ceja MD

## 2022-01-06 ENCOUNTER — VIRTUAL VISIT (OUTPATIENT)
Dept: FAMILY MEDICINE | Facility: CLINIC | Age: 37
End: 2022-01-06
Payer: COMMERCIAL

## 2022-01-06 DIAGNOSIS — R19.5 LOOSE STOOLS: Primary | ICD-10-CM

## 2022-01-06 DIAGNOSIS — R07.81 RIB PAIN ON LEFT SIDE: ICD-10-CM

## 2022-01-06 PROCEDURE — 99214 OFFICE O/P EST MOD 30 MIN: CPT | Mod: 95 | Performed by: FAMILY MEDICINE

## 2022-01-06 NOTE — PROGRESS NOTES
Mavis is a 36 year old who is being evaluated via a billable video visit.    Seen GI. Loose stool issues since early fall. History irregular bm's in past.   History ct scan lymph nodes but had covid before. Rib pain left side past 3 months. Overall rib pain improving.   did celiac test - normal and stools tests- pending. Had rib fracture 4 months. No breast pain or masses.   No black/bloody stools. No fevers or chills. No cough. No rash on chest. No shortness of breath. No weight changes. No night sweats. No family history thyroid issues. Sister with RA/colitis. S/p hysterectomy for menses.   Cold more than hot. Still have ovarian.    TSH 0.268  How would you like to obtain your AVS? MyChart  If the video visit is dropped, the invitation should be resent by: Text to cell phone: [phone]  Will anyone else be joining your video visit? No    Video Start Time: 12:12 PM    ASSESSMENT / PLAN:  (R19.5) Loose stools  (primary encounter diagnosis)  Comment: stable. ?irriatable bowels vs colitis vs?  Plan: CBC with platelets, TSH, T4, free        Repeat tsh. Consider endo if worse. No other symptoms of hyperthyroid.     (R07.81) Rib pain on left side  Comment: improving. Likely from fracture   Plan: CBC with platelets        Heat/tylenol. Consider p.t. or repeat ct if not continue improve or new symptoms - fatigue/cold/etc. Repeat cbc at lab. Expected course and warning signs reviewed. .Call/email with questions/concerns         Subjective   Mavis is a 36 year old who presents for the following health issues     HPI     F/u lab work per [place] [facility] regarding tsh      Review of Systems   {all other ROS negative.       Objective           Vitals:  No vitals were obtained today due to virtual visit.    Physical Exam   GENERAL: Healthy, alert and no distress  EYES: Eyes grossly normal to inspection.  No discharge or erythema, or obvious scleral/conjunctival abnormalities.  RESP: No audible wheeze, cough, or visible  cyanosis.  No visible retractions or increased work of breathing.    SKIN: Visible skin clear. No significant rash, abnormal pigmentation or lesions.  NEURO: Cranial nerves grossly intact.  Mentation and speech appropriate for age.  PSYCH: Mentation appears normal, affect normal/bright, judgement and insight intact, normal speech and appearance well-groomed.      Video-Visit Details    Type of service:  Video Visit    Video End Time:12:23 PM    Originating Location (pt. Location): Home    Distant Location (provider location):  [facility]     Platform used for Video Visit: [employer]

## 2022-01-16 ENCOUNTER — HEALTH MAINTENANCE LETTER (OUTPATIENT)
Age: 37
End: 2022-01-16

## 2022-01-17 ENCOUNTER — MEDICAL CORRESPONDENCE (OUTPATIENT)
Dept: HEALTH INFORMATION MANAGEMENT | Facility: CLINIC | Age: 37
End: 2022-01-17

## 2022-01-17 ENCOUNTER — TRANSFERRED RECORDS (OUTPATIENT)
Dept: HEALTH INFORMATION MANAGEMENT | Facility: CLINIC | Age: 37
End: 2022-01-17

## 2022-01-19 ENCOUNTER — MYC MEDICAL ADVICE (OUTPATIENT)
Dept: FAMILY MEDICINE | Facility: CLINIC | Age: 37
End: 2022-01-19

## 2022-01-19 ENCOUNTER — LAB (OUTPATIENT)
Dept: LAB | Facility: CLINIC | Age: 37
End: 2022-01-19
Payer: COMMERCIAL

## 2022-01-19 DIAGNOSIS — R19.4 CHANGE IN BOWEL HABITS: Primary | ICD-10-CM

## 2022-01-19 DIAGNOSIS — R07.81 RIB PAIN ON LEFT SIDE: ICD-10-CM

## 2022-01-19 DIAGNOSIS — R19.7 DIARRHEA, UNSPECIFIED: ICD-10-CM

## 2022-01-19 DIAGNOSIS — E61.1 IRON DEFICIENCY: ICD-10-CM

## 2022-01-19 DIAGNOSIS — R10.12 LUQ ABDOMINAL PAIN: ICD-10-CM

## 2022-01-19 DIAGNOSIS — G25.81 RESTLESS LEG SYNDROME: ICD-10-CM

## 2022-01-19 DIAGNOSIS — R19.5 LOOSE STOOLS: ICD-10-CM

## 2022-01-19 LAB
ERYTHROCYTE [DISTWIDTH] IN BLOOD BY AUTOMATED COUNT: 13 % (ref 10–15)
FERRITIN SERPL-MCNC: 32 NG/ML (ref 12–150)
HCT VFR BLD AUTO: 40.1 % (ref 35–47)
HGB BLD-MCNC: 13.7 G/DL (ref 11.7–15.7)
IRON SATN MFR SERPL: 29 % (ref 15–46)
IRON SERPL-MCNC: 92 UG/DL (ref 35–180)
MCH RBC QN AUTO: 31.5 PG (ref 26.5–33)
MCHC RBC AUTO-ENTMCNC: 34.2 G/DL (ref 31.5–36.5)
MCV RBC AUTO: 92 FL (ref 78–100)
PLATELET # BLD AUTO: 286 10E3/UL (ref 150–450)
RBC # BLD AUTO: 4.35 10E6/UL (ref 3.8–5.2)
T4 FREE SERPL-MCNC: 0.99 NG/DL (ref 0.76–1.46)
TIBC SERPL-MCNC: 319 UG/DL (ref 240–430)
TSH SERPL DL<=0.005 MIU/L-ACNC: 0.51 MU/L (ref 0.4–4)
WBC # BLD AUTO: 7.6 10E3/UL (ref 4–11)

## 2022-01-19 PROCEDURE — 83550 IRON BINDING TEST: CPT

## 2022-01-19 PROCEDURE — 84439 ASSAY OF FREE THYROXINE: CPT

## 2022-01-19 PROCEDURE — 85027 COMPLETE CBC AUTOMATED: CPT

## 2022-01-19 PROCEDURE — 82728 ASSAY OF FERRITIN: CPT

## 2022-01-19 PROCEDURE — 36415 COLL VENOUS BLD VENIPUNCTURE: CPT

## 2022-01-19 PROCEDURE — 84443 ASSAY THYROID STIM HORMONE: CPT

## 2022-01-24 ENCOUNTER — TRANSFERRED RECORDS (OUTPATIENT)
Dept: HEALTH INFORMATION MANAGEMENT | Facility: CLINIC | Age: 37
End: 2022-01-24

## 2022-02-02 ENCOUNTER — OFFICE VISIT (OUTPATIENT)
Dept: DERMATOLOGY | Facility: CLINIC | Age: 37
End: 2022-02-02
Payer: COMMERCIAL

## 2022-02-02 DIAGNOSIS — D22.9 MULTIPLE BENIGN NEVI: ICD-10-CM

## 2022-02-02 DIAGNOSIS — L82.1 SEBORRHEIC KERATOSIS: ICD-10-CM

## 2022-02-02 DIAGNOSIS — D18.01 CHERRY ANGIOMA: ICD-10-CM

## 2022-02-02 DIAGNOSIS — L81.4 SOLAR LENTIGO: ICD-10-CM

## 2022-02-02 DIAGNOSIS — L57.8 SUN-DAMAGED SKIN: Primary | ICD-10-CM

## 2022-02-02 PROCEDURE — 99203 OFFICE O/P NEW LOW 30 MIN: CPT | Performed by: DERMATOLOGY

## 2022-02-02 NOTE — PROGRESS NOTES
[facility] Dermatology Note  Encounter Date: Feb 2, 2022  Office Visit     Dermatology Problem List:  Last skin check 2/2/22, recommended every 2 years  None.    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  Family history: Dad and paternal grandparents with skin cancer, uncertain type. Most likely NMSC.   ____________________________________________    Assessment & Plan:     # Sun damaged skin with solar lentigines: Chronic, stable.  - Recommend sunscreens SPF #30 or greater, protective clothing and avoidance of tanning beds.    # Benign skin findings including: seborrheic keratoses, cherry angioma - minor, self limited problem  - No further intervention required. Patient to report changes.   - Patient reassured of the benign nature of these lesions.    # Multiple clinically benign nevi: Chronic, stable  - No further intervention required. Patient to report changes.   - Patient reassured of the benign nature of these lesions.    Procedures Performed:   None.    Follow-up: 2 years in-person for skin check, or earlier for new or changing lesions.    Staff and Scribe:     Scribe Disclosure:   I, Paige Swain, am serving as a scribe to document services personally performed by this physician, Dr. Aydee High, based on data collection and the provider's statements to me.     Provider Disclosure:   The documentation recorded by the scribe accurately reflects the services I personally performed and the decisions made by me.    Aydee High MD    Department of Dermatology  [facility]: Phone: [phone], Fax:[phone]  [facility]: Phone: [phone], Fax: [phone]    ____________________________________________    CC: Derm Problem ([facility], saw derm and benign mole removed about 6 year ago while pregnant, dad and paternal grandparents  hx of skin cancer)    HPI:  Ms. Mavis Krishnamurthy is a(n) 37 year old female who presents today as a new patient for skin check.    She is new to our department. She has seen a dermatologist prior ([facility]). There is a history of a benign mole removed about 6 years ago during pregnancy. She has no history of skin cancer, atypical moles, or precancerous lesions to her knowledge.    Self referred.    Today, Mavis notes concern of a flat lesion near the right hairline. Has grown over time. Just went to [place] for work last week. There is also a lesion that sometimes itches that she would like checked.    Patient is otherwise feeling well, without additional skin concerns.     Labs Reviewed:  N/A    Physical Exam:  Vitals: LMP 01/09/2020 (Approximate)   SKIN: Total skin excluding the undergarment areas was performed. The exam included the head/face, neck, both arms, chest, back, abdomen, buttocks, both legs, digits and/or nails.  - Tinsley Type II  - Scattered brown macules on sun exposed areas.  - There are dome shaped bright red papules on the trunk.   - Multiple regular brown pigmented macules and papules are identified on the trunk and extremities. About 75 nevi in total on exam. All are less than 6mm in size. None with significant atypia.  - There are waxy stuck on tan to brown papules on the trunk.  - No other lesions of concern on areas examined.     Medications:  Current Outpatient Medications   Medication     albuterol (PROAIR HFA/PROVENTIL HFA/VENTOLIN HFA) 108 (90 Base) MCG/ACT inhaler     cyclobenzaprine (FLEXERIL) 10 MG tablet     diclofenac (VOLTAREN) 1 % topical gel     tiZANidine (ZANAFLEX) 4 MG tablet     No current facility-administered medications for this visit.      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

## 2022-02-02 NOTE — LETTER
2/2/2022         RE: Mavis Krishnamurthy  [address] [facility] [address]        Dear Colleague,    Thank you for referring your patient, Mavis Krishnamurthy, to the [facility]. Please see a copy of my visit note below.    [facility] Dermatology Note  Encounter Date: Feb 2, 2022  Office Visit     Dermatology Problem List:  Last skin check 2/2/22, recommended every 2 years  None.    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  Family history: Dad and paternal grandparents with skin cancer, uncertain type. Most likely NMSC.   ____________________________________________    Assessment & Plan:     # Sun damaged skin with solar lentigines: Chronic, stable.  - Recommend sunscreens SPF #30 or greater, protective clothing and avoidance of tanning beds.    # Benign skin findings including: seborrheic keratoses, cherry angioma - minor, self limited problem  - No further intervention required. Patient to report changes.   - Patient reassured of the benign nature of these lesions.    # Multiple clinically benign nevi: Chronic, stable  - No further intervention required. Patient to report changes.   - Patient reassured of the benign nature of these lesions.    Procedures Performed:   None.    Follow-up: 2 years in-person for skin check, or earlier for new or changing lesions.    Staff and Scribe:     Scribe Disclosure:   I, Paige Swain, am serving as a scribe to document services personally performed by this physician, Dr. Aydee High, based on data collection and the provider's statements to me.     Provider Disclosure:   The documentation recorded by the scribe accurately reflects the services I personally performed and the decisions made by me.    Aydee High MD    Department of Dermatology  [facility]: Phone: [phone], Fax:[phone]  [facility]  [facility]: Phone: [phone], Fax: [phone]    ____________________________________________    [place] [place] and benign mole removed about 6 year ago while pregnant, dad and paternal grandparents hx of skin cancer)    HPI:  Ms. Mavis Krishnamurthy is a(n) 37 year old female who presents today as a new patient for skin check.    She is new to our department. She has seen a dermatologist prior ([facility]). There is a history of a benign mole removed about 6 years ago during pregnancy. She has no history of skin cancer, atypical moles, or precancerous lesions to her knowledge.    Self referred.    Today, Mavis notes concern of a flat lesion near the right hairline. Has grown over time. Just went to [place] for work last week. There is also a lesion that sometimes itches that she would like checked.    Patient is otherwise feeling well, without additional skin concerns.     Labs Reviewed:  N/A    Physical Exam:  Vitals: LMP 01/09/2020 (Approximate)   SKIN: Total skin excluding the undergarment areas was performed. The exam included the head/face, neck, both arms, chest, back, abdomen, buttocks, both legs, digits and/or nails.  - Tinsley Type II  - Scattered brown macules on sun exposed areas.  - There are dome shaped bright red papules on the trunk.   - Multiple regular brown pigmented macules and papules are identified on the trunk and extremities. About 75 nevi in total on exam. All are less than 6mm in size. None with significant atypia.  - There are waxy stuck on tan to brown papules on the trunk.  - No other lesions of concern on areas examined.     Medications:  Current Outpatient Medications   Medication     albuterol (PROAIR HFA/PROVENTIL HFA/VENTOLIN HFA) 108 (90 Base) MCG/ACT inhaler     cyclobenzaprine (FLEXERIL) 10 MG tablet     diclofenac (VOLTAREN) 1 % topical gel     tiZANidine (ZANAFLEX) 4 MG tablet     No current  facility-administered medications for this visit.      Past Medical History:   Patient Active Problem List   Diagnosis     CARDIOVASCULAR SCREENING; LDL GOAL LESS THAN 160     Allergic rhinitis     IBS (irritable bowel syndrome)     Chronic maxillary sinusitis     Lactose intolerance     Migraine     Abnormal antinuclear antibody titer     Bicuspid aortic valve     Esophageal inlet patch     Hx of nasal polypectomy     Past Medical History:   Diagnosis Date     Abnormal Pap smear     resolved, colposcopy     Abnormal Pap smears     Mild dysplasia-2007     AR (allergic rhinitis)      Bicuspid aortic valve        CC Referred Self, MD  No address on file on close of this encounter.        Again, thank you for allowing me to participate in the care of your patient.        Sincerely,        Aydee High MD

## 2022-02-02 NOTE — NURSING NOTE
Mavis Krishnamurthy's goals for this visit include:   Chief Complaint   Patient presents with     Derm Problem     [facility] [place], saw derm and benign mole removed about 6 year ago while pregnant, dad and paternal grandparents hx of skin cancer       She requests these members of her care team be copied on today's visit information: no    PCP: Leo Ceja    Referring Provider:  Referred Self, MD  No address on file    LMP 01/09/2020 (Approximate)     Do you need any medication refills at today's visit? No    Last seen [facility] in [place] L[place]

## 2022-02-22 ENCOUNTER — VIRTUAL VISIT (OUTPATIENT)
Dept: FAMILY MEDICINE | Facility: CLINIC | Age: 37
End: 2022-02-22
Payer: COMMERCIAL

## 2022-02-22 DIAGNOSIS — Z86.2 HX OF IRON DEFICIENCY ANEMIA: ICD-10-CM

## 2022-02-22 DIAGNOSIS — T14.8XXA BRUISING: Primary | ICD-10-CM

## 2022-02-22 DIAGNOSIS — G25.81 RESTLESS LEGS SYNDROME (RLS): ICD-10-CM

## 2022-02-22 PROCEDURE — 99214 OFFICE O/P EST MOD 30 MIN: CPT | Mod: 95 | Performed by: FAMILY MEDICINE

## 2022-02-22 RX ORDER — DIAZEPAM 2 MG
TABLET ORAL
Qty: 20 TABLET | Refills: 2 | Status: SHIPPED | OUTPATIENT
Start: 2022-02-22 | End: 2022-06-07

## 2022-02-22 NOTE — PROGRESS NOTES
Mavis is a 37 year old who is being evaluated via a billable video visit.      Leg pains. Was seen by rheumatology and given iron supplement for low ferritin.   Similar issues in past.   Had painful welts on thighs and formed into bruising. Negative ultrasound for dvt's.   No family history bleeding disorders but family history of ello-dandos   No issues with arms - only arms. Past 2-3 years but not having bruising at times - just pain.  S/p hysteretomy 2/2022 - had low iron had issues even before. Still has ovaries.   4 year old kid - no issues with pregnancy. No current iron pils. Seen GI provider for loose stools. Requested colonoscopy in April.   No major bruising elsewhere just in thighs- mainly were restless legs. On/off issues.   2 coffees/day - no changes with coffee/not.  Balanced diet.   requip not helpful.   How would you like to obtain your AVS? MyChart  If the video visit is dropped, the invitation should be resent by: Text to cell phone: [phone]  Will anyone else be joining your video visit? No    Video Start Time: 9:41 AM    ASSESSMENT / PLAN:  (T14.8XXA) Bruising  (primary encounter diagnosis)  Comment: in thighs. ?unclear etiology  Plan: Adult Oncology/Hematology Referral        Patient would like to start with heme. Consider derm or back to rheum?      (Z86.2) Hx of iron deficiency anemia  Comment: improving  Plan: Adult Oncology/Hematology Referral        Seeing GI. Heme second opinion. Expected course and warning signs reviewed. Call/email with questions/concerns     (B85.35) Restless legs syndrome (RLS)  Comment: from low iron but ferritin ok  Plan: patient will restart one iron pill daily to see if helpful. Consider neurology input or another daily med like mirapax. Limit caffeine and sugars.         Subjective   Mavis is a 37 year old who presents for the following health issues     HPI     Patient is being seen for bilateral leg pain. Patient was seen in Urgent Care for this.    Review  of Systems   All other ROS negative.       Objective           Vitals:  No vitals were obtained today due to virtual visit.    Physical Exam   GENERAL: Healthy, alert and no distress  EYES: Eyes grossly normal to inspection.  No discharge or erythema, or obvious scleral/conjunctival abnormalities.  RESP: No audible wheeze, cough, or visible cyanosis.  No visible retractions or increased work of breathing.    SKIN: Visible skin clear. No significant rash, abnormal pigmentation or lesions.  NEURO: Cranial nerves grossly intact.  Mentation and speech appropriate for age.  PSYCH: Mentation appears normal, affect normal/bright, judgement and insight intact, normal speech and appearance well-groomed.            Video-Visit Details    Type of service:  Video Visit    Video End Time:9:56 AM    Originating Location (pt. Location): Home    Distant Location (provider location):  [facility]     Platform used for Video Visit: Musikki

## 2022-03-06 NOTE — TELEPHONE ENCOUNTER
Patient Education   Table of Contents       Dysfunctional Uterine Bleeding     To view videos and all your education online visit,   [email or web address]   or scan this QR code with your smartphone.                  Dysfunctional Uterine Bleeding     Dysfunctional uterine bleeding is abnormal bleeding from the uterus. Dysfunctional uterine bleeding includes:       A menstrual period that comes earlier or later than usual.       A menstrual period that is lighter or heavier than usual, or has large blood clots.       Vaginal bleeding between menstrual periods.       Skipping one or more menstrual periods.       Vaginal bleeding after sex.       Vaginal bleeding after menopause.     Follow these instructions at home:   Eating and drinking            Eat well-balanced meals. Include foods that are high in iron, such as liver, meat, shellfish, green leafy vegetables, and eggs.      To prevent or treat constipation, your health care provider may recommend that you:       Drink enough fluid to keep your urine pale yellow.       Take over-the-counter or prescription medicines.       Eat foods that are high in fiber, such as beans, whole grains, and fresh fruits and vegetables.       Limit foods that are high in fat and processed sugars, such as fried or sweet foods.     Medicines         Take over-the-counter and prescription medicines only as told by your health care provider.      Do not  change medicines without talking with your health care provider.      Aspirin or medicines that contain aspirin may make the bleeding worse. Do not  take those medicines:       During the week before your menstrual period.       During your menstrual period.       If you were prescribed iron pills, take them as told by your health care provider. Iron pills help to replace iron that your body loses because of this condition.     Activity        If you need to change your sanitary pad or tampon more than one time every 2 hours:  Prior Authorization Approval    Authorization Effective Date: 9/20/2021  Authorization Expiration Date: 10/20/2022  Medication: Diclofenac (VOLTAREN) 1 % topical gel- APPROVED  Approved Dose/Quantity: 100 gm  Reference #: [id number]   Insurance Company: [employer] - Phone [phone] Fax [phone]  Expected CoPay: $14.67      CoPay Card Available:      Foundation Assistance Needed:    Which Pharmacy is filling the prescription (Not needed for infusion/clinic administered): [facility] [address] [place] [facility] [address] [facility]  Pharmacy Notified: Yes, pharmacy has a paid claim  Patient Notified: Pharmacy will notify patient when ready        Central Prior Authorization Team  Phone: [phone]             Lie in bed with your feet raised (elevated).       Place a cold pack on your lower abdomen.       Rest as much as possible until the bleeding stops or slows down.      Do not  try to lose weight until the bleeding has stopped and your blood iron level is back to normal.     General instructions           For two months, write down:       When your menstrual period starts.       When your menstrual period ends.       When any abnormal vaginal bleeding occurs.       What problems you notice.       Keep all follow up visits as told by your health care provider. This is important.         Contact a health care provider if you:         Feel light-headed or weak.       Have nausea and vomiting.       Cannot eat or drink without vomiting.       Feel dizzy or have diarrhea while you are taking medicines.       Are taking birth control pills or hormones, and you want to change them or stop taking them.     Get help right away if:         You develop a fever or chills.       You need to change your sanitary pad or tampon more than one time per hour.       Your vaginal bleeding becomes heavier, or your flow contains clots more often.       You develop pain in your abdomen.       You lose consciousness.       You develop a rash.     Summary         Dysfunctional uterine bleeding is abnormal bleeding from the uterus.       It includes menstrual bleeding of abnormal duration, volume, or regularity.       Bleeding after sex and after menopause are also considered dysfunctional uterine bleeding.     This information is not intended to replace advice given to you by your health care provider. Make sure you discuss any questions you have with your health care provider.     Document Released: 12/15/2001Document Revised: 05/29/2019Document Reviewed: 05/29/2019     [employer] Patient Education ? 2021 [employer]

## 2022-04-13 ENCOUNTER — TRANSFERRED RECORDS (OUTPATIENT)
Dept: HEALTH INFORMATION MANAGEMENT | Facility: CLINIC | Age: 37
End: 2022-04-13

## 2022-04-20 NOTE — PROGRESS NOTES
"  Assessment & Plan       ICD-10-CM    1. Angular cheilitis  K13.0 hydrocortisone 2.5 % cream   Talk to patient about her concerns we will go start hydrocortisone cream use twice a day for up to a month.  She can continue with Vaseline as a moisturizer if she is not improving she had a MyChart and we will try something different.  Warning signs discussed.    Return in about 4 weeks (around 5/19/2022) for recheck, As Needed.    Sohan Flannery PA-C  [facility] 37 [facility] who presents for the following health issues  accompanied by her self.    History of Present Illness       Reason for visit:  Lips burning  Symptom onset:  3-4 weeks ago    She eats 4 or more servings of fruits and vegetables daily.She consumes 0 sweetened beverage(s) daily.She exercises with enough effort to increase her heart rate 20 to 29 minutes per day.  She exercises with enough effort to increase her heart rate 4 days per week.   She is taking medications regularly.   no new products.   Otherwise feels food.   Unclear if there is any new foods that may have caused it.   Mostly lips feel so dry.       Review of Systems   Constitutional, HEENT, cardiovascular, pulmonary, gi and gu systems are negative, except as otherwise noted.      Objective    /69   Pulse 60   Temp 97.1  F (36.2  C) (Tympanic)   Resp 16   Ht 1.626 m (5' 4\")   Wt 54.4 kg (120 lb)   LMP 01/09/2020 (Approximate)   SpO2 100%   BMI 20.60 kg/m    Body mass index is 20.6 kg/m .  Physical Exam   GENERAL: healthy, alert and no distress  EYES: Eyes grossly normal to inspection, PERRL and conjunctivae and sclerae normal  Mouth was examined and free of lesions her lips are dry and cracked in the corner edges.          "

## 2022-04-21 ENCOUNTER — OFFICE VISIT (OUTPATIENT)
Dept: FAMILY MEDICINE | Facility: CLINIC | Age: 37
End: 2022-04-21
Payer: COMMERCIAL

## 2022-04-21 VITALS
TEMPERATURE: 97.1 F | BODY MASS INDEX: 20.49 KG/M2 | DIASTOLIC BLOOD PRESSURE: 69 MMHG | OXYGEN SATURATION: 100 % | HEIGHT: 64 IN | HEART RATE: 60 BPM | RESPIRATION RATE: 16 BRPM | SYSTOLIC BLOOD PRESSURE: 101 MMHG | WEIGHT: 120 LBS

## 2022-04-21 DIAGNOSIS — K13.0 ANGULAR CHEILITIS: Primary | ICD-10-CM

## 2022-04-21 PROCEDURE — 99213 OFFICE O/P EST LOW 20 MIN: CPT | Performed by: PHYSICIAN ASSISTANT

## 2022-04-21 RX ORDER — HYDROCORTISONE 2.5 %
CREAM (GRAM) TOPICAL 2 TIMES DAILY
Qty: 15 G | Refills: 0 | Status: SHIPPED | OUTPATIENT
Start: 2022-04-21 | End: 2022-06-08

## 2022-04-21 ASSESSMENT — PAIN SCALES - GENERAL: PAINLEVEL: NO PAIN (0)

## 2022-04-28 ENCOUNTER — MYC MEDICAL ADVICE (OUTPATIENT)
Dept: FAMILY MEDICINE | Facility: CLINIC | Age: 37
End: 2022-04-28

## 2022-04-28 DIAGNOSIS — K13.0 ANGULAR CHEILITIS: Primary | ICD-10-CM

## 2022-04-29 RX ORDER — MUPIROCIN 20 MG/G
OINTMENT TOPICAL 3 TIMES DAILY
Qty: 30 G | Refills: 0 | Status: SHIPPED | OUTPATIENT
Start: 2022-04-29 | End: 2022-06-07

## 2022-05-16 ENCOUNTER — E-VISIT (OUTPATIENT)
Dept: URGENT CARE | Facility: CLINIC | Age: 37
End: 2022-05-16
Payer: COMMERCIAL

## 2022-05-16 DIAGNOSIS — B37.31 CANDIDAL VULVOVAGINITIS: Primary | ICD-10-CM

## 2022-05-16 PROCEDURE — 99421 OL DIG E/M SVC 5-10 MIN: CPT | Performed by: FAMILY MEDICINE

## 2022-05-16 RX ORDER — FLUCONAZOLE 150 MG/1
150 TABLET ORAL ONCE
Qty: 1 TABLET | Refills: 0 | Status: SHIPPED | OUTPATIENT
Start: 2022-05-16 | End: 2022-05-31

## 2022-05-16 NOTE — PATIENT INSTRUCTIONS
Thank you for choosing us for your care. I have placed an order for a prescription so that you can start treatment. View your full visit summary for details by clicking on the link below. Your pharmacist will able to address any questions you may have about the medication.     If you re not feeling better within 2-3 days, please schedule an appointment.  You can schedule an appointment right here in [facility], or call [phone]  If the visit is for the same symptoms as your eVisit, we ll refund the cost of your eVisit if seen within seven days.      Yeast Infection (Candida Vaginal Infection)    You have a Candida vaginal infection. This is also known as a yeast infection. It's most often caused by a type of yeast (fungus) called Candida. Candida are normally found in the vagina. But if they increase in number, this can lead to infection and cause symptoms.   Symptoms of a yeast infection can include:     Clumpy or thin, white discharge, which may look like cottage cheese    Itching or burning    Burning with urination  Certain factors can make a yeast infection more likely. These can include:     Taking certain medicines, such as antibiotics or birth control pills    Pregnancy    Diabetes    Weak immune system  A yeast infection is most often treated with antifungal medicine. This may be given as a vaginal cream or pills you take by mouth. Treatment may last for about 1 to 7 days. Women with severe or recurrent infections may need longer courses of treatment.   Home care    If you re prescribed medicine, be sure to use it as directed. Finish all of the medicine, even if your symptoms go away. Don t try to treat yourself using over-the-counter products without talking with your provider first. They will let you know if this is a good option for you.    Ask your provider what steps you can take to help reduce your risk of having a yeast infection in the future.    Follow-up care  Follow up with your healthcare  provider, or as directed.   When to seek medical advice  Call your healthcare provider right away if:     You have a fever of 100.4 F (38 C) or higher, or as directed by your provider.    Your symptoms worsen, or they don t go away within a few days of starting treatment.    You have new pain in the lower belly or pelvic region.    You have side effects that bother you or a reaction to the cream or pills you re prescribed.    You or any partners you have sex with have new symptoms, such as a rash, joint pain, or sores.  [employer] last reviewed this educational content on 7/1/2020 2000-2021 [employer]. All rights reserved. This information is not intended as a substitute for professional medical care. Always follow your healthcare professional's instructions.

## 2022-05-19 ENCOUNTER — OFFICE VISIT (OUTPATIENT)
Dept: URGENT CARE | Facility: URGENT CARE | Age: 37
End: 2022-05-19
Payer: COMMERCIAL

## 2022-05-19 VITALS
DIASTOLIC BLOOD PRESSURE: 68 MMHG | SYSTOLIC BLOOD PRESSURE: 108 MMHG | WEIGHT: 122.4 LBS | HEART RATE: 80 BPM | RESPIRATION RATE: 12 BRPM | BODY MASS INDEX: 21.01 KG/M2 | OXYGEN SATURATION: 100 % | TEMPERATURE: 98.5 F

## 2022-05-19 DIAGNOSIS — N76.0 VAGINITIS AND VULVOVAGINITIS: ICD-10-CM

## 2022-05-19 DIAGNOSIS — R35.0 URINARY FREQUENCY: Primary | ICD-10-CM

## 2022-05-19 LAB
ALBUMIN UR-MCNC: NEGATIVE MG/DL
AMORPH CRY #/AREA URNS HPF: ABNORMAL /HPF
APPEARANCE UR: ABNORMAL
BILIRUB UR QL STRIP: NEGATIVE
CLUE CELLS: ABNORMAL
COLOR UR AUTO: YELLOW
GLUCOSE UR STRIP-MCNC: NEGATIVE MG/DL
HGB UR QL STRIP: NEGATIVE
KETONES UR STRIP-MCNC: ABNORMAL MG/DL
LEUKOCYTE ESTERASE UR QL STRIP: NEGATIVE
NITRATE UR QL: NEGATIVE
PH UR STRIP: 7 [PH] (ref 5–7)
RBC #/AREA URNS AUTO: ABNORMAL /HPF
SP GR UR STRIP: 1.02 (ref 1–1.03)
TRICHOMONAS, WET PREP: ABNORMAL
UROBILINOGEN UR STRIP-ACNC: 1 E.U./DL
WBC #/AREA URNS AUTO: ABNORMAL /HPF
WBC'S/HIGH POWER FIELD, WET PREP: ABNORMAL
YEAST, WET PREP: ABNORMAL

## 2022-05-19 PROCEDURE — 87086 URINE CULTURE/COLONY COUNT: CPT | Performed by: FAMILY MEDICINE

## 2022-05-19 PROCEDURE — 81001 URINALYSIS AUTO W/SCOPE: CPT | Performed by: FAMILY MEDICINE

## 2022-05-19 PROCEDURE — 99213 OFFICE O/P EST LOW 20 MIN: CPT | Performed by: FAMILY MEDICINE

## 2022-05-19 PROCEDURE — 87210 SMEAR WET MOUNT SALINE/INK: CPT | Performed by: FAMILY MEDICINE

## 2022-05-19 PROCEDURE — 87491 CHLMYD TRACH DNA AMP PROBE: CPT | Performed by: FAMILY MEDICINE

## 2022-05-19 PROCEDURE — 87591 N.GONORRHOEAE DNA AMP PROB: CPT | Performed by: FAMILY MEDICINE

## 2022-05-19 RX ORDER — LACTOBACILLUS RHAMNOSUS GG 10B CELL
1 CAPSULE ORAL 2 TIMES DAILY
COMMUNITY
End: 2022-06-07

## 2022-05-19 RX ORDER — FLUCONAZOLE 150 MG/1
150 TABLET ORAL ONCE
Qty: 3 TABLET | Refills: 0 | Status: SHIPPED | OUTPATIENT
Start: 2022-05-19 | End: 2022-05-19

## 2022-05-19 NOTE — PROGRESS NOTES
Chief complaint: vaginal concerns    3 -4 days ago had cottage cheese discharge   Did an e visit got diflucan x 1 pill took it 3 days ago   Symptoms seemed to be more mild not getting worse 2 days   But last night got worse with itching very severe  Patient did an over the counter monistat and was burning   Was very nauseous last night and today feels like on fire    The discharge is a bit thinner today a bit more milky    Has some increased frequency, no dysuria  Possibility of pregnancy: No  Pelvic pain: No  Concern about STD exposure, unprotected sex, or high risk sexual behavior: No    Problem list and histories reviewed & adjusted, as indicated.  Additional history: as documented    Problem list, Medication list, Allergies, and Medical/Social/Surgical histories reviewed in [facility] and updated as appropriate.    ROS:  Constitutional, HEENT, cardiovascular, pulmonary, gi and gu systems are negative, except as otherwise noted.    OBJECTIVE:                                                    /68   Pulse 80   Temp 98.5  F (36.9  C) (Oral)   Resp 12   Wt 55.5 kg (122 lb 6.4 oz)   LMP 01/09/2020 (Approximate)   SpO2 100%   BMI 21.01 kg/m    Body mass index is 21.01 kg/m .  GENERAL: healthy, alert and no distress  EYES: Eyes grossly normal to inspection  MS: no gross musculoskeletal defects noted, no edema  SKIN: no suspicious lesions or rashes  NEURO: Normal strength and tone, mentation intact and speech normal  PSYCH: mentation appears normal, affect normal/bright    Diagnostic Test Results:  Results for orders placed or performed in visit on 05/19/22 (from the past 24 hour(s))   Wet prep - lab collect    Specimen: Vagina; Swab   Result Value Ref Range    Trichomonas Absent Absent    Yeast Absent Absent    Clue Cells Absent Absent    WBCs/high power field 2+ (A) None   UA Macro with Reflex to Micro and Culture - lab collect    Specimen: Urine, Midstream   Result Value Ref Range    Color Urine Yellow  Colorless, Straw, Light Yellow, Yellow    Appearance Urine Slightly Cloudy (A) Clear    Glucose Urine Negative Negative mg/dL    Bilirubin Urine Negative Negative    Ketones Urine Trace (A) Negative mg/dL    Specific Gravity Urine 1.020 1.003 - 1.035    Blood Urine Negative Negative    pH Urine 7.0 5.0 - 7.0    Protein Albumin Urine Negative Negative mg/dL    Urobilinogen Urine 1.0 0.2, 1.0 E.U./dL    Nitrite Urine Negative Negative    Leukocyte Esterase Urine Negative Negative   Urine Microscopic   Result Value Ref Range    RBC Urine 0-2 0-2 /HPF /HPF    WBC Urine 0-5 0-5 /HPF /HPF    Amorphous Crystals Urine Many (A) None Seen /HPF    Narrative    Urine Culture not indicated        ASSESSMENT/PLAN:                                                        ICD-10-CM    1. Urinary frequency  R35.0 UA Macro with Reflex to Micro and Culture - lab collect     UA Macro with Reflex to Micro and Culture - lab collect     Urine Microscopic     Urine Culture Aerobic Bacterial - lab collect     Urine Culture Aerobic Bacterial - lab collect   2. Vaginitis and vulvovaginitis  N76.0 Wet prep - lab collect     NEISSERIA GONORRHOEA PCR     CHLAMYDIA TRACHOMATIS PCR     Wet prep - lab collect     NEISSERIA GONORRHOEA PCR     CHLAMYDIA TRACHOMATIS PCR     fluconazole (DIFLUCAN) 150 MG tablet     Negative for yeast at wet prep however possibly false negative as patient already pretreated  Prescribed with diflucan   Recommend follow up with primary care provider if no relief in 3 days, sooner if worse  Adverse reactions of medications discussed.  Over the counter medications discussed.   Aware to come back in if with worsening symptoms or if no relief despite treatment plan  Patient voiced understanding and had no further questions.     Patient has a history of MDR UTI - will send for culture   Treat if positive   Heidy Hicks MD    See Patient Instructions    Heidy Hicks MD  [facility]  [place]

## 2022-05-20 LAB
C TRACH DNA SPEC QL NAA+PROBE: NEGATIVE
N GONORRHOEA DNA SPEC QL NAA+PROBE: NEGATIVE

## 2022-05-21 LAB — BACTERIA UR CULT: NO GROWTH

## 2022-05-27 ENCOUNTER — OFFICE VISIT (OUTPATIENT)
Dept: OBGYN | Facility: CLINIC | Age: 37
End: 2022-05-27
Payer: COMMERCIAL

## 2022-05-27 VITALS
WEIGHT: 125 LBS | SYSTOLIC BLOOD PRESSURE: 111 MMHG | BODY MASS INDEX: 21.46 KG/M2 | HEART RATE: 69 BPM | DIASTOLIC BLOOD PRESSURE: 70 MMHG

## 2022-05-27 DIAGNOSIS — N89.8 VAGINAL IRRITATION: Primary | ICD-10-CM

## 2022-05-27 DIAGNOSIS — N76.0 BV (BACTERIAL VAGINOSIS): ICD-10-CM

## 2022-05-27 DIAGNOSIS — B96.89 BV (BACTERIAL VAGINOSIS): ICD-10-CM

## 2022-05-27 LAB
CLUE CELLS: PRESENT
TRICHOMONAS, WET PREP: ABNORMAL
WBC'S/HIGH POWER FIELD, WET PREP: ABNORMAL
YEAST, WET PREP: ABNORMAL

## 2022-05-27 PROCEDURE — 99214 OFFICE O/P EST MOD 30 MIN: CPT | Performed by: OBSTETRICS & GYNECOLOGY

## 2022-05-27 PROCEDURE — 87210 SMEAR WET MOUNT SALINE/INK: CPT | Performed by: OBSTETRICS & GYNECOLOGY

## 2022-05-27 RX ORDER — FLUTICASONE PROPIONATE 50 MCG
1 SPRAY, SUSPENSION (ML) NASAL DAILY
COMMUNITY

## 2022-05-27 RX ORDER — METRONIDAZOLE 7.5 MG/G
1 GEL VAGINAL DAILY
Qty: 70 G | Refills: 0 | Status: SHIPPED | OUTPATIENT
Start: 2022-05-27 | End: 2022-06-07
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
## 2022-05-27 NOTE — PATIENT INSTRUCTIONS
To Schedule an Appointment 24/7  Call: [phone]    If you have any questions regarding your visit, Please contact your care team.  [facility]: [phone]  [facility] HOURS TELEPHONE NUMBER   Cephas Agbeh, M.D.      Giovanni Diggs-Surgery Scheduler  Melina-Surgery Scheduler         Monday - [place]:    8:00 am-4:45 pm  Tuesday - [place]:   8:00 am-4:45 pm  Friday-[place]:       8:00 am-4:45 pm  Typical Surgery Day:  Wednesday [facility]   [address]   [place] [id number] [phone]   Fax [phone]    Imaging Scheduling all locations  [phone]      [facility]   [address] [place] [address] [address].   [place], [place] [address] [phone]    [facility]  [address] [facility] [address]  [phone]  Fax [phone]     Urgent Care locations:  [facility] Monday-Friday                               10 am - 8 pm  Saturday and Sunday                      9 am - 5 pm  Monday-Friday                              10 am- 8 pm  Saturday and Sunday                      9 am - 5 pm    [phone]   **Surgeries** Our Surgery Schedulers will contact you to schedule. If you do not receive a call within 3 business days, please call [phone].  If you need a medication refill, please contact your pharmacy. Please allow 3 business days for your refill to be completed.  As always, Thank you for trusting us with your healthcare needs!  see additional instructions from your care team below

## 2022-06-07 ENCOUNTER — OFFICE VISIT (OUTPATIENT)
Dept: OBGYN | Facility: CLINIC | Age: 37
End: 2022-06-07
Payer: COMMERCIAL

## 2022-06-07 VITALS
DIASTOLIC BLOOD PRESSURE: 67 MMHG | OXYGEN SATURATION: 99 % | WEIGHT: 121.9 LBS | SYSTOLIC BLOOD PRESSURE: 94 MMHG | BODY MASS INDEX: 20.92 KG/M2 | HEART RATE: 81 BPM

## 2022-06-07 DIAGNOSIS — N89.8 VAGINAL ITCHING: Primary | ICD-10-CM

## 2022-06-07 DIAGNOSIS — R30.0 BURNING WITH URINATION: ICD-10-CM

## 2022-06-07 LAB
ALBUMIN UR-MCNC: NEGATIVE MG/DL
APPEARANCE UR: CLEAR
BILIRUB UR QL STRIP: NEGATIVE
CLUE CELLS: ABNORMAL
COLOR UR AUTO: COLORLESS
GLUCOSE UR STRIP-MCNC: NEGATIVE MG/DL
HGB UR QL STRIP: NEGATIVE
KETONES UR STRIP-MCNC: NEGATIVE MG/DL
LEUKOCYTE ESTERASE UR QL STRIP: NEGATIVE
NITRATE UR QL: NEGATIVE
PH UR STRIP: 7 [PH] (ref 5–7)
RBC #/AREA URNS AUTO: ABNORMAL /HPF
SP GR UR STRIP: 1 (ref 1–1.03)
SQUAMOUS #/AREA URNS AUTO: ABNORMAL /LPF
TRICHOMONAS, WET PREP: ABNORMAL
UROBILINOGEN UR STRIP-MCNC: NORMAL MG/DL
WBC #/AREA URNS AUTO: ABNORMAL /HPF
WBC'S/HIGH POWER FIELD, WET PREP: ABNORMAL
YEAST, WET PREP: ABNORMAL

## 2022-06-07 PROCEDURE — 81001 URINALYSIS AUTO W/SCOPE: CPT | Performed by: OBSTETRICS & GYNECOLOGY

## 2022-06-07 PROCEDURE — 99214 OFFICE O/P EST MOD 30 MIN: CPT | Performed by: OBSTETRICS & GYNECOLOGY

## 2022-06-07 PROCEDURE — 87210 SMEAR WET MOUNT SALINE/INK: CPT | Performed by: OBSTETRICS & GYNECOLOGY

## 2022-06-07 NOTE — PATIENT INSTRUCTIONS
To Schedule an Appointment 24/7  Call: [phone]    If you have any questions regarding your visit, Please contact your care team.  [facility]: [phone]  [facility] HOURS TELEPHONE NUMBER   Cephas Agbeh, M.D.      Giovanni Diggs-Surgery Scheduler  Melina-Surgery Scheduler         Monday - [place]:    8:00 am-4:45 pm  Tuesday - [place]:   8:00 am-4:45 pm  Friday-[place]:       8:00 am-4:45 pm  Typical Surgery Day:  Wednesday [facility]   [address]   [place] [address] [phone]   Fax [phone]    Imaging Scheduling all locations  [phone]      [facility] Labor [facility] Delivery   [address] [place] [address] Dr.   [place], [place] [address] [phone]    [facility]  [address] [facility] [address]  [phone]  Fax [phone]     Urgent Care locations:  [facility] Monday-Friday                               10 am - 8 pm  Saturday and Sunday                      9 am - 5 pm  Monday-Friday                              10 am- 8 pm  Saturday and Sunday                      9 am - 5 pm    [phone]   **Surgeries** Our Surgery Schedulers will contact you to schedule. If you do not receive a call within 3 business days, please call [phone].  If you need a medication refill, please contact your pharmacy. Please allow 3 business days for your refill to be completed.  As always, Thank you for trusting us with your healthcare needs!  see additional instructions from your care team below

## 2022-06-07 NOTE — PROGRESS NOTES
Mavis is a 37 year old  referred here by self for consultation regarding recurrent vaginitis and some burning with urination, but improving.  She has completed her metrogel for BV started on 22    ROS: Ten point review of systems was reviewed and negative except the above.    Gyne: - abn pap (last pap ), - STD's    Past Medical History:   Diagnosis Date     Abnormal Pap smear     resolved, colposcopy     Abnormal Pap smears     Mild dysplasia-     AR (allergic rhinitis)      Bicuspid aortic valve      Past Surgical History:   Procedure Laterality Date     COLONOSCOPY WITH CO2 INSUFFLATION N/A 2015    Procedure: COLONOSCOPY WITH CO2 INSUFFLATION;  Surgeon: Angel Thomas MD;  Location: [facility]     ENDOSCOPIC BALLOON SINUPLASTY, OPTICAL TRACKING SYSTEM ENDOSCOPIC SINUS SURGERY, COMBINED  2013    Procedure: COMBINED ENDOSCOPIC BALLOON SINUPLASTY, OPTICAL TRACKING SYSTEM ENDOSCOPIC SINUS SURGERY;  Bilateral Endoscopic Ethmoidectomy, Maxillary Antrostomy, Frontal Sinusototmy with Navigation and Balloon and Propel Implants;  Surgeon: Vasquez Corona MD;  Location: [facility] OR     ENDOSCOPY  10/2017    Of [place]     EXAM OF VAGINA,COLPOSCOPY      X -2     LAPAROSCOPIC HYSTERECTOMY TOTAL, SALPINGECTOMY BILATERAL  2020    menorrhagia,      MOUTH SURGERY      [place]      PHOTOREFRACTIVE KERATECTOMY      Oct and Dec 2015     UPPER GI ENDOSCOPY       Patient Active Problem List   Diagnosis     CARDIOVASCULAR SCREENING; LDL GOAL LESS THAN 160     Allergic rhinitis     IBS (irritable bowel syndrome)     Chronic maxillary sinusitis     Lactose intolerance     Migraine     Abnormal antinuclear antibody titer     Bicuspid aortic valve     Esophageal inlet patch     Hx of nasal polypectomy       ALL/Meds: Her medication and allergy histories were reviewed and are documented in their appropriate chart areas.    SH: - tob, - EtOH,     FH: Her family  history was reviewed and documented in its appropriate chart area.    PE: BP 94/67   Pulse 81   Wt 55.3 kg (121 lb 14.4 oz)   LMP 01/09/2020 (Approximate)   SpO2 99%   Breastfeeding No   BMI 20.92 kg/m    Body mass index is 20.92 kg/m .    General Appearance:  healthy, alert, active, no distress  HEENT: NCAT  Abdomen: Soft, nontender.  Normal bowel sounds.  No masses  Pelvic:       - Ext: NEFG,        - Urethra: no lesions, no masses, no hypermobility       - Urethral Meatus: normal appearance,        - Bladder: no tenderness, no masses       - Vagina: pink, moist, normal rugae, no lesions, no discharge       Nurse for exam  Results for orders placed or performed in visit on 06/07/22   UA with Microscopic reflex to Culture - lab collect     Status: Normal    Specimen: Urine, Midstream   Result Value Ref Range    Color Urine Colorless Colorless, Straw, Light Yellow, Yellow    Appearance Urine Clear Clear    Glucose Urine Negative Negative mg/dL    Bilirubin Urine Negative Negative    Ketones Urine Negative Negative mg/dL    Specific Gravity Urine 1.004 0.999 - 1.035    Blood Urine Negative Negative    pH Urine 7.0 5.0 - 7.0    Protein Albumin Urine Negative Negative mg/dL    Nitrite Urine Negative Negative    Leukocyte Esterase Urine Negative Negative    Urobilinogen Urine Normal Normal, 2.0 mg/dL   Urine Microscopic     Status: Abnormal   Result Value Ref Range    RBC Urine 0-2 0-2 /HPF /HPF    WBC Urine 0-5 0-5 /HPF /HPF    Squamous Epithelials Urine Few (A) None Seen /LPF    Narrative    Urine Culture not indicated   Wet prep - Clinic Collect     Status: Abnormal    Specimen: Vagina; Swab   Result Value Ref Range    Trichomonas Absent Absent    Yeast Absent Absent    Clue Cells Absent Absent    WBCs/high power field 1+ (A) None         A/P    1.     ICD-10-CM    1. Vaginal itching  N89.8 Wet prep - Clinic Collect     UA with Microscopic reflex to Culture - lab collect     UA with Microscopic reflex to  Culture - lab collect     Urine Microscopic   2. Burning with urination  R30.0 Wet prep - Clinic Collect     UA with Microscopic reflex to Culture - lab collect     UA with Microscopic reflex to Culture - lab collect     Urine Microscopic     Discussed hat all her tests are ben, so no treatment needed at this time.  return to clinic prn.    Total time preparing to see patient with reviewing prior encounter and labs, face to face time,  and coordinating care on the same calendar date: 30 mins.      CEPHAS AGBEH, MD.

## 2022-06-08 ENCOUNTER — OFFICE VISIT (OUTPATIENT)
Dept: HEMATOLOGY | Facility: CLINIC | Age: 37
End: 2022-06-08
Attending: FAMILY MEDICINE
Payer: COMMERCIAL

## 2022-06-08 VITALS
WEIGHT: 121.3 LBS | TEMPERATURE: 98.3 F | OXYGEN SATURATION: 100 % | SYSTOLIC BLOOD PRESSURE: 114 MMHG | HEART RATE: 70 BPM | HEIGHT: 64 IN | RESPIRATION RATE: 16 BRPM | DIASTOLIC BLOOD PRESSURE: 77 MMHG | BODY MASS INDEX: 20.71 KG/M2

## 2022-06-08 DIAGNOSIS — T14.8XXA BRUISING: ICD-10-CM

## 2022-06-08 DIAGNOSIS — Z86.2 HX OF IRON DEFICIENCY ANEMIA: ICD-10-CM

## 2022-06-08 LAB
APTT PPP: 30 SECONDS (ref 22–38)
BASOPHILS # BLD AUTO: 0.1 10E3/UL (ref 0–0.2)
BASOPHILS NFR BLD AUTO: 1 %
C3 SERPL-MCNC: 117 MG/DL (ref 81–157)
C4 SERPL-MCNC: 28 MG/DL (ref 13–39)
CRP SERPL-MCNC: <2.9 MG/L (ref 0–8)
EOSINOPHIL # BLD AUTO: 0.3 10E3/UL (ref 0–0.7)
EOSINOPHIL NFR BLD AUTO: 5 %
ERYTHROCYTE [DISTWIDTH] IN BLOOD BY AUTOMATED COUNT: 12.8 % (ref 10–15)
ERYTHROCYTE [SEDIMENTATION RATE] IN BLOOD BY WESTERGREN METHOD: 7 MM/HR (ref 0–20)
FERRITIN SERPL-MCNC: 29 NG/ML (ref 12–150)
FIBRINOGEN PPP-MCNC: 319 MG/DL (ref 170–490)
HCT VFR BLD AUTO: 42.4 % (ref 35–47)
HGB BLD-MCNC: 14.4 G/DL (ref 11.7–15.7)
IGA SERPL-MCNC: 286 MG/DL (ref 84–499)
IGG SERPL-MCNC: 1480 MG/DL (ref 610–1616)
IGM SERPL-MCNC: 113 MG/DL (ref 35–242)
IMM GRANULOCYTES # BLD: 0 10E3/UL
IMM GRANULOCYTES NFR BLD: 0 %
INR PPP: 1.03 (ref 0.85–1.15)
LYMPHOCYTES # BLD AUTO: 2.1 10E3/UL (ref 0.8–5.3)
LYMPHOCYTES NFR BLD AUTO: 33 %
MCH RBC QN AUTO: 31.1 PG (ref 26.5–33)
MCHC RBC AUTO-ENTMCNC: 34 G/DL (ref 31.5–36.5)
MCV RBC AUTO: 92 FL (ref 78–100)
MONOCYTES # BLD AUTO: 0.4 10E3/UL (ref 0–1.3)
MONOCYTES NFR BLD AUTO: 7 %
NEUTROPHILS # BLD AUTO: 3.4 10E3/UL (ref 1.6–8.3)
NEUTROPHILS NFR BLD AUTO: 54 %
NRBC # BLD AUTO: 0 10E3/UL
NRBC BLD AUTO-RTO: 0 /100
PLATELET # BLD AUTO: 265 10E3/UL (ref 150–450)
RBC # BLD AUTO: 4.63 10E6/UL (ref 3.8–5.2)
WBC # BLD AUTO: 6.2 10E3/UL (ref 4–11)

## 2022-06-08 PROCEDURE — 82728 ASSAY OF FERRITIN: CPT | Performed by: INTERNAL MEDICINE

## 2022-06-08 PROCEDURE — 85240 CLOT FACTOR VIII AHG 1 STAGE: CPT | Performed by: INTERNAL MEDICINE

## 2022-06-08 PROCEDURE — 85246 CLOT FACTOR VIII VW ANTIGEN: CPT | Performed by: INTERNAL MEDICINE

## 2022-06-08 PROCEDURE — 85247 CLOT FACTOR VIII MULTIMETRIC: CPT | Performed by: INTERNAL MEDICINE

## 2022-06-08 PROCEDURE — 86160 COMPLEMENT ANTIGEN: CPT | Performed by: INTERNAL MEDICINE

## 2022-06-08 PROCEDURE — 99205 OFFICE O/P NEW HI 60 MIN: CPT | Performed by: INTERNAL MEDICINE

## 2022-06-08 PROCEDURE — 85390 FIBRINOLYSINS SCREEN I&R: CPT | Mod: 26 | Performed by: PATHOLOGY

## 2022-06-08 PROCEDURE — 85610 PROTHROMBIN TIME: CPT | Performed by: INTERNAL MEDICINE

## 2022-06-08 PROCEDURE — 86140 C-REACTIVE PROTEIN: CPT | Performed by: INTERNAL MEDICINE

## 2022-06-08 PROCEDURE — 85652 RBC SED RATE AUTOMATED: CPT | Performed by: INTERNAL MEDICINE

## 2022-06-08 PROCEDURE — 82784 ASSAY IGA/IGD/IGG/IGM EACH: CPT | Performed by: INTERNAL MEDICINE

## 2022-06-08 PROCEDURE — 85384 FIBRINOGEN ACTIVITY: CPT | Performed by: INTERNAL MEDICINE

## 2022-06-08 PROCEDURE — 85004 AUTOMATED DIFF WBC COUNT: CPT | Performed by: INTERNAL MEDICINE

## 2022-06-08 PROCEDURE — 85245 CLOT FACTOR VIII VW RISTOCTN: CPT

## 2022-06-08 PROCEDURE — 85730 THROMBOPLASTIN TIME PARTIAL: CPT | Performed by: INTERNAL MEDICINE

## 2022-06-08 PROCEDURE — G0463 HOSPITAL OUTPT CLINIC VISIT: HCPCS

## 2022-06-08 PROCEDURE — 36415 COLL VENOUS BLD VENIPUNCTURE: CPT | Performed by: INTERNAL MEDICINE

## 2022-06-08 ASSESSMENT — PAIN SCALES - GENERAL: PAINLEVEL: NO PAIN (0)

## 2022-06-08 NOTE — PATIENT INSTRUCTIONS
It was a pleasure meeting you today here at [facility] at the [facility].  Based on your symptoms, I think it is reasonable to screen you for several bleeding disorders, some of which can be acquired, as your symptoms would suggest.  I think is more likely that your symptoms are related to inflammation of the blood vessels called vasculitis.  Today we will do blood test for both common bleeding disorders and inflammation.  Given your history of iron deficiency and your persistently low ferritin, I also recommended that you restart your iron supplements and have given you some information below about treatment of iron deficiency.  My goal would be to improve your ferritin over the course of the next 3 to 4 months to at least 80, and more ideally over 100.     Latest Reference Range & Units 01/08/21 08:08 01/19/22 13:31   Ferritin 12 - 150 ng/mL 24 32     Iron Deficiency symptoms can include fatigue, decreased exercise tolerance, heart palpitations, increased pulse, confusion, ringing in ears, or painful tongue. Another sign of iron deficiency is the craving to chew ice.    Iron deficiency is the leading cause of anemia worldwide. Anemia is a condition where your hemoglobin is reduced. Hemoglobin is responsible for carrying oxygen in your body. Low hemoglobin can be caused by blood loss (i.e. Heavy periods, GI bleed), malabsorption (i.e. inflammatory bowel disease, gastric bypass), increase needs (i.e. Pregnancy), or poor diet. Severe cases of anemia can lead to extreme fatigue and shortness of breath.    Ferritin is a blood test that measures your iron stores in your body. A level below 50 is often thought to indicate iron deficiency, although lab normals can run a bit lower than this.    Below are 3 ways to increase your ferritin levels (or the amount of iron in your blood):    Dietary Iron The best (most easily absorbed) source of iron is meat.  Beans, spinach,  poultry, fish, oysters, and liver also contain iron but iron that is not from animal source, like fortified cereals, is not well absorbed.     Oral Iron There are several different types of OTC (over the counter) iron.  They include ferrous fumarate, ferrous sulfate, and ferrous gluconate. The recommended dose is 325 mg/day up to three times a day, but many people cannot tolerate oral iron this frequently.  Sometimes it is recommended to take every other day for better absorption.  Important things to know:  It is frequently not tolerated well by the stomach and can cause constipation.   It can take many months to increase your iron level with oral iron.  If just starting oral iron, start with 1 tab daily. After 1 week, if tolerating, increase to 1 tab every 12 hours. After another week, if tolerating, increase to 1 tab every 8 hours and continue.  Vitamin C helps iron absorption, so many take it with orange juice without calcium.  Certain foods and medications can interfere with the absorption of iron.  Avoid taking iron for 2 hours before or 2 hours after the foods or medications listed below.  Foods include dairy products, coffee, tea, chocolate, eggs, and fiber.   Medications that inhibit iron absorption include antacids, proton pump inhibitors (to treat acid reflux) or calcium supplements.   Medication Interactions: Quinolone or Tetracycline antibiotics, Drugs used for HIV (Integrase inhibitors) Levodopa, Levothyroxine, Methyldopa (Aldomet), Mycophenolate mofetil (CellCept) Penicillamine (Cuprimine, Depen) can interact with iron. Please notify your provider if you are taking any of these medications.     IV Iron If you cannot tolerate oral iron, IV iron is often recommended. Some types can be quite expensive, so insurance checks are often routinely done to check on coverage. The different types of IV iron are listed below.  Some people do experience IV iron transfusion reactions.  Injectafer or Feraheme - Given  by two 15 minute infusions one week apart  Infed - Current shortage; given over 1 day, but long infusion (~6 hours)  Venofer - Multiple (3-5) 15 minute infusions

## 2022-06-08 NOTE — PROGRESS NOTES
[facility]  [address] [place] [address]  Phone: [phone], Fax: [phone]    Outpatient Visit Note:    Patient: Mavis Krishnamurthy  MRN: [id number]  : 1985  CHEMA: 2022     Reason for Consultation:  Mavis Krishnamurthy is a referred by Dr Ceja for evaluation and treatment of easy bruising and iron deficiency.    Assessment: Mavis Krishnamurthy is a 37 year old woman with incompletely treated iron deficiency and new bleeding symptoms suggestive of an acquired bleeding disorder or vasculitis.  We discussed treatment of iron deficiency with oral iron replacement with a goal ferritin of 100.  We will also plan to do laboratory evaluation for coagulation defects and inflammatory markers given my clinical suspicion for underlying vasculitis.    Recommendations:  1. I counseled the patient about my assessment of potential diagnoses to explain her new symptoms  2. She will restart iron replacement therapy and uptitrate as tolerated.  I have given her some more information about iron preparations that she could try as well.  I also counseled her about strategies to prevent recurrent constipation particularly as this can worsen with iron replacement therapy.  Goal ferritin is 100 and I recommended she have a repeat ferritin in about 3 months.  3. For new bleeding symptoms, again given symptoms I am more suspicious of a vasculitic process then acquired coagulation defect.  However, given that the symptoms are fairly nonspecific we will screen her for acquired coagulation deficiencies, von Willebrand disease disease and repeat her ferritin, check for evidence of an underlying inflammatory disorder.    60 minutes spent on the date of the encounter doing chart review, review of test results, interpretation of tests, patient visit and documentation     Darrel Paula MD   of Medicine, Division of Hematology, Oncology and Transplantation  [facility]  [employer]     -------------------------------  History: Mavis Krishnamurthy is a 37 year old healthy woman with the exception of history of iron deficiency which was treated with oral iron replacement, who notes new onset of easy bruising and 2022.  With regard to iron deficiency, she reports that she was diagnosed after the birth of her third child.  She had heavy bleeding after all 3 of her pregnancies which were delivered by .  She had such heavy menstrual cycles that cannot be controlled with hormonal strategies that she opted for hysterectomy.  She was then treated with iron replacement by slow release iron.  This resolved her anemia but her ferritin still remains low.  She also notes ongoing problems with restless leg syndrome and peeling nails.  She is fatigued frequently.  She notes mild chronic constipation treated with MiraLAX once a week and this did not seem to worsen with once daily iron replacement therapy.    In January she started noticing painful areas in her legs which then would progress to a bruise over time.  She notes no fevers chills or night sweats.  Weight is stable.  Appetite is good.  She notes no new rashes.  She has had no joint pain.  She had a recent urinalysis that showed no proteinuria or red blood cells in the urine.  The location of the bruises were primarily on the anterior thighs but then started noticing them on the posterior part of the thighs.  Several of these hematomas were several inches in size.  1 of note, was on the left flank and she shows me a picture today that was several inches in diameter and very painful.  She has had no other bleeding symptoms which is epistaxis, hematuria, melena, hematochezia.  She had wisdom tooth extraction as a child with no bleeding.  She had no bleeding during deliveries of her 3 children.  She had no bleeding or transfusion requirement for her hysterectomy.  She has had no other surgeries.    She notes no family history of  bleeding disorders.  Several women in her family to have autoimmune disorders.  She is a non-smoker and does not drink alcohol or use other drugs.  She works as an .  Her medicines are only notable for Flonase and MiraLAX as needed.    Physical Exam:  Vitals: B/P: 114/77, T: 98.3, P: 70, R: 16, Wt: 121 lbs 4.8 oz Body mass index is 20.82 kg/m .   Exam:   Gen: Appears well, no distress  HEENT: no scleral icterus or hemorrhage, no wet purpura, no lymphadenopathy  Skin: no ecchymoses or hematomas  Neuro: no focal deficits, affect and cognition are normal    Labs:  Most recent CBC and UA are normal    Imaging:  None

## 2022-06-14 DIAGNOSIS — R23.3 EASY BRUISING: Primary | ICD-10-CM

## 2022-06-14 DIAGNOSIS — R23.3 EASY BRUISING: ICD-10-CM

## 2022-06-14 LAB
FACT VIII ACT/NOR PPP: 89 % (ref 55–200)
VWF AG ACT/NOR PPP IA: 90 % (ref 50–200)
VWF MULTIMERS PPP IB: NORMAL
VWF:AC ACT/NOR PPP IA: 85 % (ref 50–180)

## 2022-06-22 LAB
VON WILLEBRAND EVAL PPP-IMP: NORMAL
VWF MULTIMERS PPP QL: NORMAL

## 2022-08-15 ENCOUNTER — OFFICE VISIT (OUTPATIENT)
Dept: FAMILY MEDICINE | Facility: CLINIC | Age: 37
End: 2022-08-15
Payer: COMMERCIAL

## 2022-08-15 VITALS
TEMPERATURE: 97.8 F | DIASTOLIC BLOOD PRESSURE: 70 MMHG | OXYGEN SATURATION: 100 % | BODY MASS INDEX: 20.94 KG/M2 | WEIGHT: 122 LBS | HEART RATE: 88 BPM | SYSTOLIC BLOOD PRESSURE: 114 MMHG

## 2022-08-15 DIAGNOSIS — K21.00 GASTROESOPHAGEAL REFLUX DISEASE WITH ESOPHAGITIS WITHOUT HEMORRHAGE: ICD-10-CM

## 2022-08-15 DIAGNOSIS — R00.2 PALPITATION: Primary | ICD-10-CM

## 2022-08-15 DIAGNOSIS — R53.83 FATIGUE, UNSPECIFIED TYPE: ICD-10-CM

## 2022-08-15 LAB
ALBUMIN UR-MCNC: NEGATIVE MG/DL
ANION GAP SERPL CALCULATED.3IONS-SCNC: 5 MMOL/L (ref 3–14)
APPEARANCE UR: CLEAR
BACTERIA #/AREA URNS HPF: ABNORMAL /HPF
BASOPHILS # BLD AUTO: 0 10E3/UL (ref 0–0.2)
BASOPHILS NFR BLD AUTO: 0 %
BILIRUB UR QL STRIP: NEGATIVE
BUN SERPL-MCNC: 11 MG/DL (ref 7–30)
CALCIUM SERPL-MCNC: 8.8 MG/DL (ref 8.5–10.1)
CHLORIDE BLD-SCNC: 107 MMOL/L (ref 94–109)
CO2 SERPL-SCNC: 27 MMOL/L (ref 20–32)
COLOR UR AUTO: YELLOW
CREAT SERPL-MCNC: 0.55 MG/DL (ref 0.52–1.04)
EOSINOPHIL # BLD AUTO: 0.3 10E3/UL (ref 0–0.7)
EOSINOPHIL NFR BLD AUTO: 3 %
ERYTHROCYTE [DISTWIDTH] IN BLOOD BY AUTOMATED COUNT: 12.4 % (ref 10–15)
GFR SERPL CREATININE-BSD FRML MDRD: >90 ML/MIN/1.73M2
GLUCOSE BLD-MCNC: 86 MG/DL (ref 70–99)
GLUCOSE UR STRIP-MCNC: NEGATIVE MG/DL
HCT VFR BLD AUTO: 37.4 % (ref 35–47)
HGB BLD-MCNC: 12.7 G/DL (ref 11.7–15.7)
HGB UR QL STRIP: ABNORMAL
KETONES UR STRIP-MCNC: NEGATIVE MG/DL
LEUKOCYTE ESTERASE UR QL STRIP: NEGATIVE
LYMPHOCYTES # BLD AUTO: 2.1 10E3/UL (ref 0.8–5.3)
LYMPHOCYTES NFR BLD AUTO: 23 %
MCH RBC QN AUTO: 31.4 PG (ref 26.5–33)
MCHC RBC AUTO-ENTMCNC: 34 G/DL (ref 31.5–36.5)
MCV RBC AUTO: 92 FL (ref 78–100)
MONOCYTES # BLD AUTO: 0.6 10E3/UL (ref 0–1.3)
MONOCYTES NFR BLD AUTO: 6 %
MONOCYTES NFR BLD AUTO: NEGATIVE %
NEUTROPHILS # BLD AUTO: 6.1 10E3/UL (ref 1.6–8.3)
NEUTROPHILS NFR BLD AUTO: 67 %
NITRATE UR QL: NEGATIVE
PH UR STRIP: 6.5 [PH] (ref 5–7)
PLATELET # BLD AUTO: 242 10E3/UL (ref 150–450)
POTASSIUM BLD-SCNC: 3.6 MMOL/L (ref 3.4–5.3)
RBC # BLD AUTO: 4.05 10E6/UL (ref 3.8–5.2)
RBC #/AREA URNS AUTO: ABNORMAL /HPF
SODIUM SERPL-SCNC: 139 MMOL/L (ref 133–144)
SP GR UR STRIP: 1.02 (ref 1–1.03)
SQUAMOUS #/AREA URNS AUTO: ABNORMAL /LPF
UROBILINOGEN UR STRIP-ACNC: 0.2 E.U./DL
WBC # BLD AUTO: 9.1 10E3/UL (ref 4–11)
WBC #/AREA URNS AUTO: ABNORMAL /HPF

## 2022-08-15 PROCEDURE — 36415 COLL VENOUS BLD VENIPUNCTURE: CPT | Performed by: NURSE PRACTITIONER

## 2022-08-15 PROCEDURE — 99214 OFFICE O/P EST MOD 30 MIN: CPT | Performed by: NURSE PRACTITIONER

## 2022-08-15 PROCEDURE — 86308 HETEROPHILE ANTIBODY SCREEN: CPT | Performed by: NURSE PRACTITIONER

## 2022-08-15 PROCEDURE — 86618 LYME DISEASE ANTIBODY: CPT | Performed by: NURSE PRACTITIONER

## 2022-08-15 PROCEDURE — 93000 ELECTROCARDIOGRAM COMPLETE: CPT | Performed by: NURSE PRACTITIONER

## 2022-08-15 PROCEDURE — 80048 BASIC METABOLIC PNL TOTAL CA: CPT | Performed by: NURSE PRACTITIONER

## 2022-08-15 PROCEDURE — 81001 URINALYSIS AUTO W/SCOPE: CPT | Performed by: NURSE PRACTITIONER

## 2022-08-15 PROCEDURE — 85025 COMPLETE CBC W/AUTO DIFF WBC: CPT | Performed by: NURSE PRACTITIONER

## 2022-08-15 ASSESSMENT — ENCOUNTER SYMPTOMS
EYE ITCHING: 0
WHEEZING: 0
CHILLS: 0
DIAPHORESIS: 0
CONSTIPATION: 0
LIGHT-HEADEDNESS: 0
HEADACHES: 0
COUGH: 0
HEARTBURN: 1
PALPITATIONS: 1
CHEST TIGHTNESS: 1
DIARRHEA: 0
NAUSEA: 1
FEVER: 0
DIZZINESS: 0
FATIGUE: 1
EYE DISCHARGE: 0
SINUS PRESSURE: 0
RHINORRHEA: 0
SHORTNESS OF BREATH: 0
SORE THROAT: 0

## 2022-08-15 NOTE — PROGRESS NOTES
Assessment & Plan     Palpitation  Previous cardiology notes reviewed.  EKG unchanged from previous.  We will plan to continue to monitor.  Educated patient regarding symptoms to watch for and when would need to seek medical attention  - EKG 12-lead complete w/read - Clinics  - CBC with Platelets & Differential; Future  - Basic metabolic panel; Future  - Mononucleosis screen; Future  - UA reflex to Microscopic and Culture; Future    Fatigue, unspecified type  Previous hematology notes reviewed.  We will check labs here today.  Full plan will depend on outcome.    Gastroesophageal reflux disease with esophagitis without hemorrhage  Recommend 2 weeks of omeprazole/Prilosec.  To follow-up if heartburn continues after completing 2-week course.      Review of external notes as documented elsewhere in note  Review of the result(s) of each unique test - Labs, EKG, echo  Ordering of each unique test  Prescription drug management  34 minutes spent on the date of the encounter doing chart review, history and exam, documentation and further activities per the note       Return if symptoms worsen or fail to improve.    AFSHAN Figueredo [facility] CAITLYN Gamble is a 37 year old, presenting for the following health issues:  Fatigue, Nausea, Abdominal Pain, and Heart Problem      Fatigue  Associated symptoms include fatigue and nausea. Pertinent negatives include no chills, coughing, diaphoresis, fever, headaches, rash or sore throat.   Nausea  Associated symptoms include fatigue and nausea. Pertinent negatives include no chills, coughing, diaphoresis, fever, headaches, rash or sore throat.   Heart Problem  Associated symptoms include fatigue and nausea. Pertinent negatives include no chills, coughing, diaphoresis, fever, headaches, rash or sore throat.   History of Present Illness       Reason for visit:  Nausea, some heartburn and woke up with some chest pains sat night. Having a lot of  heart palpitations  Symptom onset:  1-3 days ago    She eats 4 or more servings of fruits and vegetables daily.She consumes 0 sweetened beverage(s) daily.She exercises with enough effort to increase her heart rate 20 to 29 minutes per day.  She exercises with enough effort to increase her heart rate 5 days per week.   She is taking medications regularly.         Sat night work up with chest pain.  No increased shortness of breath or difficulty breathing.  No pain to shoulder, back, jaw or arm.  Yesterday did not feel well. Nauseated. HR and blood pressure low yesterday. Sees cardiology has a history of hypotension. Drink a bunch of Pedialyte-that did help to improve blood pressure.. Heart palpitations. Will be there for 30 seconds are so. Took a couple different heartburn meds. Did not really help. Feels like something is just off with self. Did COVID test at home this AM. Has had hyst in the past. Is to take iron, but forgets to take it.  History of iron deficiency anemia.  Recently saw hematology.  Is to be taking iron daily and recheck iron and ferritin in 3 to 6 months.  History of bicuspid aortic valve.  Sees cardiology.  Last visit with them March 2021.  Had stress echo at that time that was in normal range.  Does have history of sinus arrhythmia with junctional escape beats.    Review of Systems   Constitutional: Positive for fatigue. Negative for chills, diaphoresis and fever.   HENT: Negative for ear discharge, ear pain, hearing loss, rhinorrhea, sinus pressure and sore throat.    Eyes: Negative for discharge and itching.   Respiratory: Positive for chest tightness. Negative for cough, shortness of breath and wheezing.    Cardiovascular: Positive for palpitations.   Gastrointestinal: Positive for heartburn and nausea. Negative for constipation and diarrhea.   Skin: Negative for rash.   Neurological: Negative for dizziness, light-headedness and headaches.          Objective    /70 (BP Location: Left  arm, Patient Position: Chair, Cuff Size: Adult Regular)   Pulse 88   Temp 97.8  F (36.6  C) (Tympanic)   Wt 55.3 kg (122 lb)   LMP 01/09/2020 (Approximate)   SpO2 100%   BMI 20.94 kg/m    Body mass index is 20.94 kg/m .  Physical Exam  Constitutional:       Appearance: She is well-developed.   HENT:      Right Ear: Tympanic membrane and external ear normal. No middle ear effusion. Tympanic membrane is not erythematous.      Left Ear: Tympanic membrane and external ear normal.  No middle ear effusion. Tympanic membrane is not erythematous.      Nose: No mucosal edema or rhinorrhea.   Cardiovascular:      Rate and Rhythm: Normal rate and regular rhythm.      Heart sounds: Normal heart sounds.   Pulmonary:      Effort: Pulmonary effort is normal.      Breath sounds: Normal breath sounds.   Abdominal:      General: Bowel sounds are normal. There is no distension.      Palpations: Abdomen is soft.      Tenderness: There is no abdominal tenderness.   Skin:     General: Skin is warm and dry.      Coloration: Skin is pale.   Neurological:      Mental Status: She is alert.                  .  ..

## 2022-08-15 NOTE — PATIENT INSTRUCTIONS
Your EKG is unchanged from your previous 1.    Continue to drink plenty of fluids.    We will check you for mono here today along with checking your blood counts, a urine sample and electrolytes.    Recommend that you take 2 weeks of omeprazole/Prilosec over-the-counter.  If the heartburn continues after that please follow-up with your primary care provider.

## 2022-08-17 LAB — B BURGDOR IGG+IGM SER QL: 0.05

## 2023-01-08 ENCOUNTER — HEALTH MAINTENANCE LETTER (OUTPATIENT)
Age: 38
End: 2023-01-08

## 2023-01-12 ENCOUNTER — OFFICE VISIT (OUTPATIENT)
Dept: OBGYN | Facility: CLINIC | Age: 38
End: 2023-01-12
Payer: COMMERCIAL

## 2023-01-12 VITALS
DIASTOLIC BLOOD PRESSURE: 71 MMHG | WEIGHT: 120 LBS | SYSTOLIC BLOOD PRESSURE: 125 MMHG | OXYGEN SATURATION: 100 % | BODY MASS INDEX: 20.49 KG/M2 | HEIGHT: 64 IN | HEART RATE: 68 BPM

## 2023-01-12 DIAGNOSIS — N76.0 BV (BACTERIAL VAGINOSIS): ICD-10-CM

## 2023-01-12 DIAGNOSIS — N89.8 VAGINAL ODOR: Primary | ICD-10-CM

## 2023-01-12 DIAGNOSIS — B96.89 BV (BACTERIAL VAGINOSIS): ICD-10-CM

## 2023-01-12 PROCEDURE — 99213 OFFICE O/P EST LOW 20 MIN: CPT | Performed by: ADVANCED PRACTICE MIDWIFE

## 2023-01-12 PROCEDURE — 87210 SMEAR WET MOUNT SALINE/INK: CPT | Performed by: ADVANCED PRACTICE MIDWIFE

## 2023-01-12 RX ORDER — METRONIDAZOLE 500 MG/1
500 TABLET ORAL 2 TIMES DAILY
Qty: 14 TABLET | Refills: 0 | Status: SHIPPED | OUTPATIENT
Start: 2023-01-12 | End: 2023-01-19

## 2023-01-12 NOTE — PROGRESS NOTES
SUBJECTIVE:   38 year old female complains of vaginal discharge with an odor for about 2 weeks. She has been using probiotics and feels like it is improving. She has a history of BV about a year ago that caused a lot of irritation and pain and wants to make sure that is doesn't get that bad again.    Denies abnormal vaginal bleeding or significant pelvic pain or  fever. No UTI symptoms. Denies history of known exposure to STD.  She has a history of hysterectomy.      Patient's last menstrual period was 01/09/2020 (approximate). (Hysterectomy)    OBJECTIVE:   She appears well, afebrile.  Abdomen: benign, soft, nontender, no masses.  Pelvic Exam: normal vagina and vulva, vaginal discharge described as clear, wet mount exam shows clue cells.      ASSESSMENT:   bacteral vaginosis    PLAN:   GC and chlamydia genprobe swabs sent to lab.  Treatment: Flagyl 500 BID x 7 days  Discussed vaginal changes in vaginal discharge.  Clue cells can come and go.    ROV prn if symptoms persist or worsen.

## 2023-03-26 ENCOUNTER — MYC MEDICAL ADVICE (OUTPATIENT)
Dept: FAMILY MEDICINE | Facility: CLINIC | Age: 38
End: 2023-03-26
Payer: COMMERCIAL

## 2023-03-27 ENCOUNTER — E-VISIT (OUTPATIENT)
Dept: FAMILY MEDICINE | Facility: CLINIC | Age: 38
End: 2023-03-27
Payer: COMMERCIAL

## 2023-03-27 DIAGNOSIS — K13.0 RASH ON LIPS: Primary | ICD-10-CM

## 2023-03-27 PROCEDURE — 99421 OL DIG E/M SVC 5-10 MIN: CPT | Performed by: PHYSICIAN ASSISTANT

## 2023-03-27 RX ORDER — VALACYCLOVIR HYDROCHLORIDE 1 G/1
1000 TABLET, FILM COATED ORAL 2 TIMES DAILY
Qty: 20 TABLET | Refills: 0 | Status: SHIPPED | OUTPATIENT
Start: 2023-03-27 | End: 2023-05-19

## 2023-03-29 ENCOUNTER — OFFICE VISIT (OUTPATIENT)
Dept: URGENT CARE | Facility: URGENT CARE | Age: 38
End: 2023-03-29
Payer: COMMERCIAL

## 2023-03-29 VITALS
RESPIRATION RATE: 14 BRPM | SYSTOLIC BLOOD PRESSURE: 117 MMHG | HEART RATE: 71 BPM | OXYGEN SATURATION: 100 % | WEIGHT: 124 LBS | DIASTOLIC BLOOD PRESSURE: 82 MMHG | TEMPERATURE: 97.6 F | BODY MASS INDEX: 21.28 KG/M2

## 2023-03-29 DIAGNOSIS — K13.0 CHEILITIS: Primary | ICD-10-CM

## 2023-03-29 PROCEDURE — 99213 OFFICE O/P EST LOW 20 MIN: CPT | Performed by: PHYSICIAN ASSISTANT

## 2023-03-29 RX ORDER — KETOCONAZOLE 20 MG/G
CREAM TOPICAL 2 TIMES DAILY
Qty: 15 G | Refills: 0 | Status: SHIPPED | OUTPATIENT
Start: 2023-03-29 | End: 2023-03-29

## 2023-03-29 RX ORDER — TRIAMCINOLONE ACETONIDE 1 MG/G
CREAM TOPICAL 2 TIMES DAILY
Qty: 15 G | Refills: 0 | Status: SHIPPED | OUTPATIENT
Start: 2023-03-29 | End: 2023-04-05

## 2023-03-29 ASSESSMENT — PAIN SCALES - GENERAL: PAINLEVEL: MILD PAIN (3)

## 2023-03-29 NOTE — PATIENT INSTRUCTIONS
Increase Zyrtec to 20 mg daily. It's fine all together or  to twice per day.   Use either Aquaphor or Vaseline for moisturizer.   Cool compresses to the lips.   Apply Kenalog cream to the lips twice per day for 7 days.   Follow up if new or worsening symptoms develop.

## 2023-03-29 NOTE — PROGRESS NOTES
Patient presents with:  Rash: Bumps and little bumps on her lips, crust over and go away. Skin is raw. Pt notes that she has used Vaseline , but no lip balms. Hs to put on chapstick. Has tried vitamin E oil with no improvement. Does have a sensitivity to berries, but has not eaten any in a long time.      Clinical Decision Making:  I suspect that this patient's condition is a form of cheilitis.  It is unclear what the causes.  Since she has already tried mupirocin ointment I will recommend trying topical steroid.  Patient prescribed triamcinolone today.  She will continue to use Vaseline.  She will also increase dosage of Zyrtec in case there is an allergic component to this condition.  No findings concerning for bacterial infection, cold sores, or perioral dermatitis.      ICD-10-CM    1. Cheilitis  K13.0 triamcinolone (KENALOG) 0.1 % external cream     DISCONTINUED: ketoconazole (NIZORAL) 2 % external cream          Patient Instructions   1. Increase Zyrtec to 20 mg daily. It's fine all together or  to twice per day.   2. Use either Aquaphor or Vaseline for moisturizer.   3. Cool compresses to the lips.   4. Apply Kenalog cream to the lips twice per day for 7 days.   5. Follow up if new or worsening symptoms develop.       HPI:  Mavis Krishnamurthy is a 38 year old female who presents today complaining of bumps on the lips that started on the lower lip then crusted over and became very dry and raw.  Now upper lip is being affected.  Patient has tried vitamin E E oil, Vaseline, Vanicream, and mupirocin ointment.  She had something similar months ago and it cleared up with mupirocin, but this time it is not improving.  Current condition has been going on for the past 1 to 2 weeks.  No past medical history of cold sores.  It does not expand past the vermilion border.  No angular cheilitis signs.  Patient does have sensitivity to various as some foods, but she is try to avoid them.  When she eats her lips throb.   She has no personal history for eczema, but her children have eczema.  She takes once daily Zyrtec.  No known new cosmetics other than the ones listed above    History obtained from the patient.    Problem List:  2021-10: Esophageal inlet patch  2021-10: Hx of nasal polypectomy  2019-12: Menometrorrhagia  2019-04: Female stress incontinence  2019-04: Pelvic floor dysfunction in female  2019-04: Constipation  2017-04: Cholestasis during pregnancy in third trimester  2016-10: Chlamydia infection affecting pregnancy  2016-10: Need for Tdap vaccination  2016-10: Supervision of other normal pregnancy, antepartum  2015-09: Abnormal antinuclear antibody titer  2015-09: Bicuspid aortic valve  2014-03: Migraine  2014-01: Lactose intolerance  2013-11: Chronic maxillary sinusitis  2013-04: Spontaneous rupture of membranes  2013-04: SROM (spontaneous rupture of membranes)  2013-04: Labor and delivery, indication for care  2012-10: Supervision of normal first pregnancy  2012-08: IBS (irritable bowel syndrome)  2011-11: Allergic rhinitis  2010-10: CARDIOVASCULAR SCREENING; LDL GOAL LESS THAN 160      Past Medical History:   Diagnosis Date     Abnormal Pap smear     resolved, colposcopy     Abnormal Pap smears     Mild dysplasia-2007     AR (allergic rhinitis)      Bicuspid aortic valve        Social History     Tobacco Use     Smoking status: Never     Smokeless tobacco: Never   Substance Use Topics     Alcohol use: No       Review of Systems    Vitals:    03/29/23 1017   BP: 117/82   Pulse: 71   Resp: 14   Temp: 97.6  F (36.4  C)   TempSrc: Tympanic   SpO2: 100%   Weight: 56.2 kg (124 lb)       Physical Exam  Vitals and nursing note reviewed.   Constitutional:       General: She is not in acute distress.     Appearance: She is not toxic-appearing or diaphoretic.   HENT:      Head: Normocephalic and atraumatic.      Right Ear: External ear normal.      Left Ear: External ear normal.   Eyes:      Conjunctiva/sclera: Conjunctivae  normal.   Pulmonary:      Effort: Pulmonary effort is normal. No respiratory distress.   Skin:     Comments: Dry cracking of the lower lip.  Lips have slightly redder coloration than normal.  No honey colored crusting or vesicular rash.  No involvement inside the mouth or the angles of the mouth.  No signs of perioral dermatitis   Neurological:      Mental Status: She is alert.   Psychiatric:         Mood and Affect: Mood normal.         Behavior: Behavior normal.         Thought Content: Thought content normal.         Judgment: Judgment normal.       At the end of the encounter, I discussed results, diagnosis, medications. Discussed red flags for immediate return to clinic/ER, as well as indications for follow up if no improvement. Patient understood and agreed to plan. Patient was stable for discharge.

## 2023-04-03 ENCOUNTER — TELEPHONE (OUTPATIENT)
Dept: FAMILY MEDICINE | Facility: CLINIC | Age: 38
End: 2023-04-03
Payer: COMMERCIAL

## 2023-04-03 DIAGNOSIS — R21 RASH: Primary | ICD-10-CM

## 2023-04-03 NOTE — TELEPHONE ENCOUNTER
Called and spoke to patient. She said she would be open to seeing dermatology. Can you put a referral in?Cecy Yang MA/BETTY

## 2023-04-03 NOTE — TELEPHONE ENCOUNTER
Order/Referral Request    Who is requesting: Patient     Orders being requested: Urgent Referral to Allergy    Reason service is needed/diagnosis: Rash: Bumps and little bumps on her lips, crust over and go away. Skin is raw.    When are orders needed by: As soon as possible     Has this been discussed with Provider: Yes - urgent care    Does patient have a preference on a Group/Provider/Facility? [facility]    Does patient have an appointment scheduled?: No    Where to send orders: Place orders within Epic    Could we send this information to you in [employer] or would you prefer to receive a phone call?:   Patient would like to be contacted via [employer]

## 2023-04-04 NOTE — TELEPHONE ENCOUNTER
Called and informed patient that the referral was placed, and gave her the phone number to call and set up an appointment.Cecy Yang MA/BETTY

## 2023-04-05 ENCOUNTER — E-VISIT (OUTPATIENT)
Dept: URGENT CARE | Facility: CLINIC | Age: 38
End: 2023-04-05
Payer: COMMERCIAL

## 2023-04-05 DIAGNOSIS — B96.89 ACUTE BACTERIAL SINUSITIS: Primary | ICD-10-CM

## 2023-04-05 DIAGNOSIS — J01.90 ACUTE BACTERIAL SINUSITIS: Primary | ICD-10-CM

## 2023-04-05 PROCEDURE — 99421 OL DIG E/M SVC 5-10 MIN: CPT | Performed by: PHYSICIAN ASSISTANT

## 2023-04-05 NOTE — PATIENT INSTRUCTIONS
Dear Mavis Krishnamurthy    After reviewing your responses, I've been able to diagnose you with Acute bacterial sinusitis.      Based on your responses and diagnosis, I have prescribed   Orders Placed This Encounter     amoxicillin-clavulanate (AUGMENTIN) 875-125 MG tablet    to treat your symptoms. I have sent this to your pharmacy.?     It is also important to stay well hydrated, get lots of rest and take over-the-counter decongestants,?tylenol?or ibuprofen if you?are able to?take those medications per your primary care provider to help relieve discomfort.?     It is important that you take?all of?your prescribed medication even if your symptoms are improving after a few doses.? Taking?all of?your medicine helps prevent the symptoms from returning.?     If your symptoms worsen, you develop severe headache, vomiting, high fever (>102), or are not improving in 7 days, please contact your primary care provider for an appointment or visit any of our convenient Walk-in Care or Urgent Care Centers to be seen which can be found on our website?here.?     Thanks again for choosing?us?as your health care partner,?   ?  Leo Bedolla PA-C?

## 2023-04-14 ENCOUNTER — OFFICE VISIT (OUTPATIENT)
Dept: FAMILY MEDICINE | Facility: CLINIC | Age: 38
End: 2023-04-14
Payer: COMMERCIAL

## 2023-04-14 VITALS
DIASTOLIC BLOOD PRESSURE: 64 MMHG | RESPIRATION RATE: 16 BRPM | TEMPERATURE: 98.6 F | SYSTOLIC BLOOD PRESSURE: 98 MMHG | OXYGEN SATURATION: 99 % | HEIGHT: 64 IN | HEART RATE: 75 BPM | WEIGHT: 122.2 LBS | BODY MASS INDEX: 20.86 KG/M2

## 2023-04-14 DIAGNOSIS — R21 RASH: Primary | ICD-10-CM

## 2023-04-14 PROCEDURE — 99213 OFFICE O/P EST LOW 20 MIN: CPT | Performed by: FAMILY MEDICINE

## 2023-04-14 RX ORDER — CETIRIZINE HYDROCHLORIDE 10 MG/1
10 TABLET ORAL DAILY
COMMUNITY
End: 2024-05-21

## 2023-04-14 ASSESSMENT — PAIN SCALES - GENERAL: PAINLEVEL: MILD PAIN (3)

## 2023-04-14 NOTE — PROGRESS NOTES
"  ASSESSMENT / PLAN:  (R21) Rash  (primary encounter diagnosis)  Comment: dermatitis vs fungal  Plan: Clotrimazole 1 % OINT        Continue steroid cream next 5-7 days and stop. Clotrimazole BID a10awuo. Reveiwed risks and side effects of medication  Expected course and warning signs reviewed. Call/email with questions/concerns  Consider minocycline for te-oral dermatitis.  Derm if persists. Call/email with questions/concerns        Subjective   Mavis is a 38 year old, presenting for the following health issues:  Mouth/Lip Problem  rash on lips. Used triamcinolone and nozoral.   Similar problem last spring for one week bactroban helpful but not this times.  No cold sores in past. No history te-oral dermatitis.   No senstitive skin. Bumps and peeling.  Very Dry. vasoline prn. No scalp.   No changes in degerents. Hypoallergic.   Steroid cream was helpful.   S/p hysterectomy.       4/14/2023     2:15 PM   Additional Questions   Roomed by CRISTHIAN Molina CMA   Accompanied by Self     Mouth/Lip Problem    History of Present Illness       Reason for visit:  Lip issue    She eats 4 or more servings of fruits and vegetables daily.She consumes 0 sweetened beverage(s) daily.She exercises with enough effort to increase her heart rate 20 to 29 minutes per day.  She exercises with enough effort to increase her heart rate 5 days per week.   She is taking medications regularly.         Review of Systems         Objective    BP 98/64   Pulse 75   Temp 98.6  F (37  C) (Oral)   Resp 16   Ht 1.626 m (5' 4\")   Wt 55.4 kg (122 lb 3.2 oz)   LMP 01/09/2020 (Approximate)   SpO2 99%   BMI 20.98 kg/m    Body mass index is 20.98 kg/m .  Physical Exam   GENERAL: healthy, alert and no distress  SKIN: dry red lips with some small red bumps and some crusting corners.  NEURO: Normal strength and tone, mentation intact and speech normal  PSYCH: mentation appears normal, affect normal/bright        "

## 2023-04-23 ENCOUNTER — MYC MEDICAL ADVICE (OUTPATIENT)
Dept: FAMILY MEDICINE | Facility: CLINIC | Age: 38
End: 2023-04-23
Payer: COMMERCIAL

## 2023-04-23 ENCOUNTER — HEALTH MAINTENANCE LETTER (OUTPATIENT)
Age: 38
End: 2023-04-23

## 2023-04-23 DIAGNOSIS — R21 RASH: Primary | ICD-10-CM

## 2023-04-24 ENCOUNTER — TELEPHONE (OUTPATIENT)
Dept: DERMATOLOGY | Facility: CLINIC | Age: 38
End: 2023-04-24
Payer: COMMERCIAL

## 2023-04-24 NOTE — TELEPHONE ENCOUNTER
This encounter is being sent to inform the clinic that this patient has a referral from Dr Leo Ceja for the diagnoses of Rash [R21] and has requested that this patient be seen within 3-5 days.  Based on the availability of our provider(s), we are unable to accommodate this request.      Were all sites offered this patient?  Yes, pt is a return pt      Does scheduling algorithm request to schedule next available?  Patient has been scheduled for the first available opening with Dr Glenda Black on 5/21.  We have informed the patient that the clinic will review their referral and reach out if a sooner appointment is medically necessary.

## 2023-04-25 NOTE — TELEPHONE ENCOUNTER
Called and scheduled sooner appointment.    Thank you,  Winnei GUSMAN RN  Dermatology   [phone]

## 2023-04-28 ENCOUNTER — OFFICE VISIT (OUTPATIENT)
Dept: DERMATOLOGY | Facility: CLINIC | Age: 38
End: 2023-04-28
Payer: COMMERCIAL

## 2023-04-28 DIAGNOSIS — L30.9 DERMATITIS OF LIP: Primary | ICD-10-CM

## 2023-04-28 PROCEDURE — 99203 OFFICE O/P NEW LOW 30 MIN: CPT | Performed by: NURSE PRACTITIONER

## 2023-04-28 NOTE — PROGRESS NOTES
[facility] Dermatology Note  Encounter Date: Apr 28, 2023  Office Visit     Reviewed patients past medical history and pertinent chart review prior to patients visit today.     Dermatology Problem List:  Lip dermatitis    ____________________________________________    Assessment & Plan:     #Lip dermatitis  I discussed that this is likely a contact irritant.  We discussed eliminating the products she is using on the face and hands.   I have advised her to use the triamcinolone 0.1% ointment 2 times daily for another 7 to 10 days and only use vaseline or Vanicream ointment on her face and hands. Once rash has been resolved for 2 to 3 weeks then it is okay to start adding in any products she likes to use on her face.  She will only add one product every 2 weeks so that if she reacts she will have a better idea of what is causing the rash.  If she has not achieved full resolution in 1 month she will return to clinic for reevaluation.     Carrie Lynch, CNP  Dermatology    _______________________________________    CC: Derm Problem (Lips are irritated tx with Clotrimazole 1 % OINT, triamcinolone (KENALOG) 0.1 % external cream, ketoconazole (NIZORAL) 2 % external cream//)    HPI:  Ms. Mavis Krishnamurthy is a(n) 38 year old female who presents today as a new patient for dry lips. She had this 1 year ago and was given mupirocin ointment that cleared it up. It started again about 1-2 months ago but the mupirocin didn't help at all. She saw PCP and was given triamcinolone 0.1% cream that she used BID x7 days and it all went away. About 3-4 days after she stopped it all came back. She was then given clotrimazole cream BID x 10 day that wasn't helpful at all. She was told she might need an oral antibiotic but she had been on amoxicillin 3 weeks ago and the rash wasn't going away at that time so she didn't want to take another.     She had been using vanicream ointment at night, and was told to stop this and is only  using vaseline now. She has to use it about every 5 minutes or the skin sticks together.     Patient is otherwise feeling well, without additional skin concerns.      Physical Exam:  SKIN: Focused examination of face was performed.  - very erythematous lips without scale with sharp demarcation at the lip line    - No other lesions of concern on areas examined.     Medications:  Current Outpatient Medications   Medication     cetirizine (ZYRTEC) 10 MG tablet     fluticasone (FLONASE) 50 MCG/ACT nasal spray     Probiotic Product (PROBIOTIC ADVANCED PO)     valACYclovir (VALTREX) 1000 mg tablet     No current facility-administered medications for this visit.      Past Medical History:   Patient Active Problem List   Diagnosis     Allergic rhinitis     IBS (irritable bowel syndrome)     Chronic maxillary sinusitis     Lactose intolerance     Migraine     Abnormal antinuclear antibody titer     Bicuspid aortic valve     Esophageal inlet patch     Hx of nasal polypectomy     Past Medical History:   Diagnosis Date     Abnormal Pap smear     resolved, colposcopy     Abnormal Pap smears     Mild dysplasia-2007     AR (allergic rhinitis)      Bicuspid aortic valve        CC Leo Ceja [place]  [id number] [place] [address] on close of this encounter.

## 2023-04-28 NOTE — LETTER
4/28/2023         RE: Mavis Krishnamurthy  [id number] [address] [facility] [address]        Dear Colleague,    Thank you for referring your patient, Mavis Krishnamurthy, to the [facility]. Please see a copy of my visit note below.    [facility] Dermatology Note  Encounter Date: Apr 28, 2023  Office Visit     Reviewed patients past medical history and pertinent chart review prior to patients visit today.     Dermatology Problem List:  Lip dermatitis    ____________________________________________    Assessment & Plan:     #Lip dermatitis  I discussed that this is likely a contact irritant.  We discussed eliminating the products she is using on the face and hands.   I have advised her to use the triamcinolone 0.1% ointment 2 times daily for another 7 to 10 days and only use vaseline or Vanicream ointment on her face and hands. Once rash has been resolved for 2 to 3 weeks then it is okay to start adding in any products she likes to use on her face.  She will only add one product every 2 weeks so that if she reacts she will have a better idea of what is causing the rash.  If she has not achieved full resolution in 1 month she will return to clinic for reevaluation.     Carrie Lynch, CNP  Dermatology    _______________________________________    CC: Derm Problem (Lips are irritated tx with Clotrimazole 1 % OINT, triamcinolone (KENALOG) 0.1 % external cream, ketoconazole (NIZORAL) 2 % external cream//)    HPI:  Ms. Mavis Krishnamurthy is a(n) 38 year old female who presents today as a new patient for dry lips. She had this 1 year ago and was given mupirocin ointment that cleared it up. It started again about 1-2 months ago but the mupirocin didn't help at all. She saw PCP and was given triamcinolone 0.1% cream that she used BID x7 days and it all went away. About 3-4 days after she stopped it all came back. She was then given clotrimazole cream BID x 10 day that wasn't helpful at all. She was told  she might need an oral antibiotic but she had been on amoxicillin 3 weeks ago and the rash wasn't going away at that time so she didn't want to take another.     She had been using vanicream ointment at night, and was told to stop this and is only using vaseline now. She has to use it about every 5 minutes or the skin sticks together.     Patient is otherwise feeling well, without additional skin concerns.      Physical Exam:  SKIN: Focused examination of face was performed.  - very erythematous lips without scale with sharp demarcation at the lip line    - No other lesions of concern on areas examined.     Medications:  Current Outpatient Medications   Medication     cetirizine (ZYRTEC) 10 MG tablet     fluticasone (FLONASE) 50 MCG/ACT nasal spray     Probiotic Product (PROBIOTIC ADVANCED PO)     valACYclovir (VALTREX) 1000 mg tablet     No current facility-administered medications for this visit.      Past Medical History:   Patient Active Problem List   Diagnosis     Allergic rhinitis     IBS (irritable bowel syndrome)     Chronic maxillary sinusitis     Lactose intolerance     Migraine     Abnormal antinuclear antibody titer     Bicuspid aortic valve     Esophageal inlet patch     Hx of nasal polypectomy     Past Medical History:   Diagnosis Date     Abnormal Pap smear     resolved, colposcopy     Abnormal Pap smears     Mild dysplasia-2007     AR (allergic rhinitis)      Bicuspid aortic valve        CC Leo [place]ja [place]  [address] [place] [address] on close of this encounter.       Again, thank you for allowing me to participate in the care of your patient.        Sincerely,        Candace Lynch, AFSHAN CNP

## 2023-05-19 ENCOUNTER — OFFICE VISIT (OUTPATIENT)
Dept: FAMILY MEDICINE | Facility: CLINIC | Age: 38
End: 2023-05-19
Payer: COMMERCIAL

## 2023-05-19 VITALS
SYSTOLIC BLOOD PRESSURE: 116 MMHG | TEMPERATURE: 97.2 F | BODY MASS INDEX: 21.34 KG/M2 | RESPIRATION RATE: 16 BRPM | DIASTOLIC BLOOD PRESSURE: 74 MMHG | HEART RATE: 86 BPM | WEIGHT: 125 LBS | HEIGHT: 64 IN | OXYGEN SATURATION: 100 %

## 2023-05-19 DIAGNOSIS — R05.9 COUGH, UNSPECIFIED TYPE: ICD-10-CM

## 2023-05-19 DIAGNOSIS — B37.31 YEAST INFECTION OF THE VAGINA: ICD-10-CM

## 2023-05-19 DIAGNOSIS — J32.9 SINUSITIS, UNSPECIFIED CHRONICITY, UNSPECIFIED LOCATION: Primary | ICD-10-CM

## 2023-05-19 PROCEDURE — 99213 OFFICE O/P EST LOW 20 MIN: CPT | Performed by: PHYSICIAN ASSISTANT

## 2023-05-19 RX ORDER — ALBUTEROL SULFATE 90 UG/1
2 AEROSOL, METERED RESPIRATORY (INHALATION) EVERY 6 HOURS PRN
Qty: 18 G | Refills: 1 | Status: SHIPPED | OUTPATIENT
Start: 2023-05-19 | End: 2024-07-23

## 2023-05-19 RX ORDER — FLUCONAZOLE 150 MG/1
150 TABLET ORAL ONCE
Qty: 1 TABLET | Refills: 0 | Status: SHIPPED | OUTPATIENT
Start: 2023-05-19 | End: 2023-05-19

## 2023-05-19 ASSESSMENT — PAIN SCALES - GENERAL: PAINLEVEL: EXTREME PAIN (9)
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
## 2023-06-09 ENCOUNTER — OFFICE VISIT (OUTPATIENT)
Dept: FAMILY MEDICINE | Facility: CLINIC | Age: 38
End: 2023-06-09
Payer: COMMERCIAL

## 2023-06-09 VITALS
BODY MASS INDEX: 20.94 KG/M2 | TEMPERATURE: 97.1 F | HEART RATE: 72 BPM | SYSTOLIC BLOOD PRESSURE: 111 MMHG | DIASTOLIC BLOOD PRESSURE: 76 MMHG | WEIGHT: 122 LBS | OXYGEN SATURATION: 99 % | RESPIRATION RATE: 14 BRPM

## 2023-06-09 DIAGNOSIS — Z00.00 ROUTINE GENERAL MEDICAL EXAMINATION AT A HEALTH CARE FACILITY: Primary | ICD-10-CM

## 2023-06-09 DIAGNOSIS — J30.2 SEASONAL ALLERGIC RHINITIS, UNSPECIFIED TRIGGER: ICD-10-CM

## 2023-06-09 DIAGNOSIS — G25.81 RESTLESS LEGS: ICD-10-CM

## 2023-06-09 DIAGNOSIS — Q23.81 BICUSPID AORTIC VALVE: ICD-10-CM

## 2023-06-09 LAB
ALBUMIN SERPL BCG-MCNC: 4.4 G/DL (ref 3.5–5.2)
ALP SERPL-CCNC: 57 U/L (ref 35–104)
ALT SERPL W P-5'-P-CCNC: 12 U/L (ref 10–35)
ANION GAP SERPL CALCULATED.3IONS-SCNC: 10 MMOL/L (ref 7–15)
AST SERPL W P-5'-P-CCNC: 21 U/L (ref 10–35)
BASOPHILS # BLD AUTO: 0 10E3/UL (ref 0–0.2)
BASOPHILS NFR BLD AUTO: 0 %
BILIRUB SERPL-MCNC: 0.6 MG/DL
BUN SERPL-MCNC: 9.4 MG/DL (ref 6–20)
CALCIUM SERPL-MCNC: 9.4 MG/DL (ref 8.6–10)
CHLORIDE SERPL-SCNC: 101 MMOL/L (ref 98–107)
CHOLEST SERPL-MCNC: 126 MG/DL
CREAT SERPL-MCNC: 0.58 MG/DL (ref 0.51–0.95)
DEPRECATED HCO3 PLAS-SCNC: 27 MMOL/L (ref 22–29)
EOSINOPHIL # BLD AUTO: 0.3 10E3/UL (ref 0–0.7)
EOSINOPHIL NFR BLD AUTO: 4 %
ERYTHROCYTE [DISTWIDTH] IN BLOOD BY AUTOMATED COUNT: 12.4 % (ref 10–15)
GFR SERPL CREATININE-BSD FRML MDRD: >90 ML/MIN/1.73M2
GLUCOSE SERPL-MCNC: 88 MG/DL (ref 70–99)
HCT VFR BLD AUTO: 40.7 % (ref 35–47)
HDLC SERPL-MCNC: 52 MG/DL
HGB BLD-MCNC: 13.8 G/DL (ref 11.7–15.7)
IMM GRANULOCYTES # BLD: 0 10E3/UL
IMM GRANULOCYTES NFR BLD: 0 %
LDLC SERPL CALC-MCNC: 61 MG/DL
LYMPHOCYTES # BLD AUTO: 1.5 10E3/UL (ref 0.8–5.3)
LYMPHOCYTES NFR BLD AUTO: 22 %
MCH RBC QN AUTO: 30.9 PG (ref 26.5–33)
MCHC RBC AUTO-ENTMCNC: 33.9 G/DL (ref 31.5–36.5)
MCV RBC AUTO: 91 FL (ref 78–100)
MONOCYTES # BLD AUTO: 0.5 10E3/UL (ref 0–1.3)
MONOCYTES NFR BLD AUTO: 8 %
NEUTROPHILS # BLD AUTO: 4.4 10E3/UL (ref 1.6–8.3)
NEUTROPHILS NFR BLD AUTO: 65 %
NONHDLC SERPL-MCNC: 74 MG/DL
PLATELET # BLD AUTO: 243 10E3/UL (ref 150–450)
POTASSIUM SERPL-SCNC: 4.1 MMOL/L (ref 3.4–5.3)
PROT SERPL-MCNC: 7.6 G/DL (ref 6.4–8.3)
RBC # BLD AUTO: 4.46 10E6/UL (ref 3.8–5.2)
SODIUM SERPL-SCNC: 138 MMOL/L (ref 136–145)
TRIGL SERPL-MCNC: 63 MG/DL
WBC # BLD AUTO: 6.8 10E3/UL (ref 4–11)

## 2023-06-09 PROCEDURE — 85025 COMPLETE CBC W/AUTO DIFF WBC: CPT | Performed by: PHYSICIAN ASSISTANT

## 2023-06-09 PROCEDURE — 80053 COMPREHEN METABOLIC PANEL: CPT | Performed by: PHYSICIAN ASSISTANT

## 2023-06-09 PROCEDURE — 99395 PREV VISIT EST AGE 18-39: CPT | Performed by: PHYSICIAN ASSISTANT

## 2023-06-09 PROCEDURE — 36415 COLL VENOUS BLD VENIPUNCTURE: CPT | Performed by: PHYSICIAN ASSISTANT

## 2023-06-09 PROCEDURE — 80061 LIPID PANEL: CPT | Performed by: PHYSICIAN ASSISTANT

## 2023-06-09 ASSESSMENT — ENCOUNTER SYMPTOMS
FEVER: 0
PARESTHESIAS: 0
ARTHRALGIAS: 0
MYALGIAS: 0
CHILLS: 0
JOINT SWELLING: 0
DYSURIA: 0
HEMATOCHEZIA: 0
COUGH: 0
EYE PAIN: 0
HEMATURIA: 0
SHORTNESS OF BREATH: 0
HEARTBURN: 0
WEAKNESS: 0
PALPITATIONS: 1
SORE THROAT: 0
NERVOUS/ANXIOUS: 0
CONSTIPATION: 0
DIARRHEA: 0
BREAST MASS: 0
FREQUENCY: 0
ABDOMINAL PAIN: 0
NAUSEA: 0
DIZZINESS: 0
HEADACHES: 0

## 2023-06-09 ASSESSMENT — PAIN SCALES - GENERAL: PAINLEVEL: NO PAIN (0)

## 2023-06-23 ENCOUNTER — E-VISIT (OUTPATIENT)
Dept: URGENT CARE | Facility: CLINIC | Age: 38
End: 2023-06-23
Payer: COMMERCIAL

## 2023-06-23 ENCOUNTER — OFFICE VISIT (OUTPATIENT)
Dept: URGENT CARE | Facility: URGENT CARE | Age: 38
End: 2023-06-23
Payer: COMMERCIAL

## 2023-06-23 VITALS
RESPIRATION RATE: 14 BRPM | TEMPERATURE: 97.6 F | OXYGEN SATURATION: 100 % | BODY MASS INDEX: 21.11 KG/M2 | HEART RATE: 97 BPM | SYSTOLIC BLOOD PRESSURE: 127 MMHG | WEIGHT: 123 LBS | DIASTOLIC BLOOD PRESSURE: 77 MMHG

## 2023-06-23 DIAGNOSIS — J32.0 CHRONIC MAXILLARY SINUSITIS: Primary | ICD-10-CM

## 2023-06-23 DIAGNOSIS — J01.90 ACUTE SINUSITIS WITH SYMPTOMS > 10 DAYS: Primary | ICD-10-CM

## 2023-06-23 PROCEDURE — 99213 OFFICE O/P EST LOW 20 MIN: CPT | Performed by: FAMILY MEDICINE

## 2023-06-23 PROCEDURE — 99207 PR NON-BILLABLE SERV PER CHARTING: CPT | Performed by: FAMILY MEDICINE

## 2023-06-23 NOTE — PATIENT INSTRUCTIONS
Thank you for choosing us for your care. I think an in-clinic visit would be best next steps based on your symptoms. Please schedule a clinic appointment; you won t be charged for this eVisit.      You can schedule an appointment right here in [facility], or call [phone]

## 2023-06-23 NOTE — PROGRESS NOTES
ICD-10-CM    1. Chronic maxillary sinusitis  J32.0 Adult ENT  Referral     amoxicillin-clavulanate (AUGMENTIN) 875-125 MG tablet      recurrent symptoms. Discussed optons. Will reus the augmentin for 10-14 days. Continue with allergy meds and follow up with ent    -------------------------------  Mavis Krishnamurthy with presents with about 10 days symptoms including sinus pressure, teeth pain, congestion worse on the right. The patient has a history of sinus trouble and polyps nad was on antibiotics twice this spring. Last about a month ago. No fevers    Exposures--nothing new      Current Outpatient Medications   Medication Sig Dispense Refill     albuterol (PROAIR HFA/PROVENTIL HFA/VENTOLIN HFA) 108 (90 Base) MCG/ACT inhaler Inhale 2 puffs into the lungs every 6 hours as needed for shortness of breath, wheezing or cough 18 g 1     cetirizine (ZYRTEC) 10 MG tablet Take 10 mg by mouth daily       fluticasone (FLONASE) 50 MCG/ACT nasal spray Spray 1 spray into both nostrils daily       Probiotic Product (PROBIOTIC ADVANCED PO)          ROS otherwise negative for resp., ID,  HEENT symptoms.    Objective: /77   Pulse 97   Temp 97.6  F (36.4  C) (Tympanic)   Resp 14   Wt 55.8 kg (123 lb)   LMP 01/09/2020 (Approximate)   SpO2 100%   BMI 21.11 kg/m    Exam:  GENERAL APPEARANCE: healthy, alert and no distress  EYES: Eyes grossly normal to inspection  HENT: ear canals and TM's normal and nose and mouth without ulcers or lesions  NECK: no adenopathy, no asymmetry, masses, or scars and thyroid normal to palpation  RESP: lungs clear to auscultation - no rales, rhonchi or wheezes  CV: regular rates and rhythm, no murmur

## 2023-09-08 ENCOUNTER — MYC MEDICAL ADVICE (OUTPATIENT)
Dept: FAMILY MEDICINE | Facility: CLINIC | Age: 38
End: 2023-09-08
Payer: COMMERCIAL

## 2023-09-08 DIAGNOSIS — Z86.2 HX OF IRON DEFICIENCY ANEMIA: Primary | ICD-10-CM

## 2023-09-08 NOTE — TELEPHONE ENCOUNTER
Provider:  Please see MyChart message from the patient. She was advised to make an appointment to address this request.  Thank you. Molly Dorantes R.N.

## 2023-09-18 ENCOUNTER — LAB (OUTPATIENT)
Dept: LAB | Facility: CLINIC | Age: 38
End: 2023-09-18
Payer: COMMERCIAL

## 2023-09-18 DIAGNOSIS — Z86.2 HX OF IRON DEFICIENCY ANEMIA: ICD-10-CM

## 2023-09-18 PROCEDURE — 82728 ASSAY OF FERRITIN: CPT

## 2023-09-18 PROCEDURE — 36415 COLL VENOUS BLD VENIPUNCTURE: CPT

## 2023-09-19 ENCOUNTER — MYC MEDICAL ADVICE (OUTPATIENT)
Dept: FAMILY MEDICINE | Facility: CLINIC | Age: 38
End: 2023-09-19
Payer: COMMERCIAL

## 2023-09-19 LAB — FERRITIN SERPL-MCNC: 60 NG/ML (ref 6–175)

## 2023-09-19 NOTE — RESULT ENCOUNTER NOTE
Ms. Krishnamurthy,    All of your labs were normal/near normal for you.    Please contact the clinic if you have additional questions.  Thank you.    Sincerely,    Sohan Flannery PA-C

## 2023-09-27 ENCOUNTER — VIRTUAL VISIT (OUTPATIENT)
Dept: FAMILY MEDICINE | Facility: CLINIC | Age: 38
End: 2023-09-27
Payer: COMMERCIAL

## 2023-09-27 DIAGNOSIS — G25.81 RESTLESS LEGS: Primary | ICD-10-CM

## 2023-09-27 PROCEDURE — 99213 OFFICE O/P EST LOW 20 MIN: CPT | Mod: VID | Performed by: PHYSICIAN ASSISTANT
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
## 2023-10-21 NOTE — PROGRESS NOTES
Called patient, scheduled an appointment for 1/16/2022 @ 12:30pm.  Marisol Cornejo      PULMONARY & CRITICAL CARE PROGRESS NOTE     Today's Date: 10/21/2023  Ashlee Gant  : 1948  Attending Provider: Willian Marie MD  ________________________________________________________  Ms. Gant is 74 year old female who has a past medical history of COPD, hypertension, hyperlipidemia lumbar degenerative disc disease who present to the hospital with worsening shortness of breath cough and hypoxia  Patient seen and examined, diagnostics reviewed.    SUBJECTIVE     The patient is feeling better overall, did wear the BiPAP machine overnight, was walking around today more ambulatory, still has a cough but definitely her breathing has improved.    ASSESSMENT     # Acute hypoxemic and Hypercarbic resp failure  # URI + rhinovirus  # Asthma/ COPD over lap with exacerbation   # Abnormal PFT with moderate COPD: GOLD Class II/group E   # Hyponatremia  # Active tobacco use  # Hypertension  # Allergic rhinitis/ sinusitis   # Anxiety disorder     Testing  CT chest reviewed from 2022 mild centrilobular emphysema no pulmonary nodules  PFT 2022 reviewed FVC 2.31 L 93% FEV1 1.47 L 76% ratio is 0.64 total lung capacity 4.79 L 108% FRC 3.11 L 122% RV 2.57 131%  Diffusion capacity 57%  FEF 25-75% is low at 45%  Postbronchodilator study was not performed this is indicative of moderate obstructive lung disease with hyperinflation and air trapping the diffusion capacity is low which may be seen in early interstitial lung disease COPD but pulmonary vascular disease cannot be excluded, clinical correlation is required diffusion capacity was not corrected for hemoglobin.  RSV COVID and flu Negative  +Rhinoviurs    RECOMMENDATIONS     Goal SpO2 88 to 90%, alternating between 5 L and BiPAP on 40%  10/13 CXR: Images reviewed independently no acute cardiopulmonary abnormalities, flat diaphragms suggestive hyperinflation noted.   1/15 Echo: Shows EF of 55% with normal RV and no shunt    IV Solu-Medrol 40 every  8-hour IV, we will wean down the Solu-Medrol to twice daily.  Check an ABG tomorrow morning  DuoNebs every 4 hours standing when awake  Daytime/PRN Bipap 10/5 @40% no nocturnal BiPAP  Treated with a 3-day course of azithromycin, restarted 3-day course of azithromycin   Continue as needed guaifenesin and dextromethorphan in addition to promethazine for symptomatic cough relief.    Patient had a 400 eosinophils August 2022, normal IgE.  May qualify for anti-IL-4 or dupilumab as an outpatient  LABA-ICS and LAMA on discharge with Trelegy inhaler  Influenza, RSV and pneumococcal vaccine (patient received it 2 years ago)  Patient was counseled extensively on smoking cessation, she had made the decision that she will quit tobacco use this time.  Reassess oxygen requirement on discharge  The patient was counseled extensively on smoking cessation  Outpatient low-density CT scan screening for lung cancer  Outpatient follow-up with pulmonary for PFTs with with Dr. Chou   Outpatient referral for pulmonary rehab.  Consider chronic Azithromycin therapy as an outpatient    DVT prophylaxis: SCDs, LMWH  GI prophylaxis: on PPI  CODE STATUS: Full code    This case was discussed personally with bedside RN.  We will continue to follow with you. Should you have any questions, please do not hesitate to contact me in person.    JC GUNN MD  Pulmonary, Critical Care, & Advanced Bronchoscopy   [facility]      PHYSICAL EXAM     Current Vitals  Vitals:    10/21/23 1330 10/21/23 1400 10/21/23 1500 10/21/23 1600   BP: 128/73 125/73  103/63   BP Location:       Patient Position:       Pulse: (!) 123 (!) 108 79 82   Resp: (!) 35 (!) 28 (!) 21 (!) 22   Temp:    99 °F (37.2 °C)   TempSrc:    Oral   SpO2: 94% 94% 96% 96%   Weight:       Height:       LMP: 01/01/1995       [unfilled]  SpO2 Readings from Last 1 Encounters:   10/21/23 96%     [unfilled]    Intake/Output Summary (Last 24 hours) at  10/21/2023 1706  Last data filed at 10/21/2023 1138  Gross per 24 hour   Intake 580 ml   Output 1145 ml   Net -565 ml       I/O Summary    Intake/Output Summary (Last 24 hours) at 10/21/2023 1706  Last data filed at 10/21/2023 1138  Gross per 24 hour   Intake 580 ml   Output 1145 ml   Net -565 ml       GENERAL: Ms. Gant is a frail  female in no acute respiratory distress  EYES: Sclerae were anicteric. Mucous membranes were moist.  NECK: Trachea midline no JVD  HEART: Regular rate and rhythm.  No murmurs, No Thrills.  LUNGS: coarse to auscultation bilaterally, moderate respiratory effort with improved wheezing bilaterally, normal work of breathing., Normal work of breathing.   ABDOMEN: Soft, nontender   EXTREMITIES: No clubbing, no LE edema.  NEUROPSYCHIATRIC : Alert and oriented x3. Nonfocal,  Mood and affect are appropriate.      MEDICATIONS     Continuous Medications  Current Facility-Administered Medications   Medication Dose Route Frequency Provider Last Rate Last Admin     Scheduled Medications  Current Facility-Administered Medications   Medication Dose Route Frequency Provider Last Rate Last Admin   • methylPREDNISolone (SOLU-Medrol) injection 40 mg  40 mg Intravenous 2 times per day Zana Espinoza MD       • ipratropium-albuterol (DUONEB) 0.5-2.5 (3) MG/3ML nebulizer solution 3 mL  3 mL Nebulization Q4H Resp Zana Espinoza MD   3 mL at 10/21/23 1519   • albuterol (VENTOLIN) nebulizer 2.5 mg  2.5 mg Nebulization Once Zana Espinoza MD       • amLODIPine (NORVASC) tablet 10 mg  10 mg Oral Daily Zana Espinoza MD   10 mg at 10/21/23 0900   • guaiFENesin (MUCINEX) ER tablet 1,200 mg  1,200 mg Oral 2 times per day Gisella Chou MD   1,200 mg at 10/21/23 0900   • benzonatate (TESSALON PERLES) capsule 100 mg  100 mg Oral TID WC Gisella Chou MD   100 mg at 10/21/23 1625   • enoxaparin (LOVENOX) injection 40 mg  40 mg Subcutaneous Daily Gisella Chou MD   40 mg at 10/20/23 1816   • sodium  chloride 0.9 % injection 2 mL  2 mL Intracatheter 2 times per day Dangelo, Facundoedeep, DO   2 mL at 10/21/23 0900   • atorvastatin (LIPITOR) tablet 20 mg  20 mg Oral QHS Dangelo, Navedeep, DO   20 mg at 10/20/23 2010   • azelastine (ASTELIN) 0.1 % nasal spray 1 spray  1 spray Each Nare BID Dangelo, Facundoedeep, DO   1 spray at 10/21/23 0859   • cetirizine (ZyrTEC) tablet 10 mg  10 mg Oral Daily Dangelo, Navedeep, DO   10 mg at 10/21/23 0900   • cholecalciferol (VITAMIN D) tablet 50 mcg  50 mcg Oral Daily Dangelo, Navedeep, DO   50 mcg at 10/21/23 0900   • losartan (COZAAR) tablet 100 mg  100 mg Oral Daily Dangelo, Navedeep, DO   100 mg at 10/21/23 0900   • montelukast (SINGULAIR) tablet 10 mg  10 mg Oral Nightly Dangelo, Navedeep, DO   10 mg at 10/20/23 2014   • pantoprazole (PROTONIX) EC tablet 40 mg  40 mg Oral QAM AC Dangelo, Navedeep, DO   40 mg at 10/21/23 0518   • [Held by provider] umeclidinium-vilanterol (ANORO ELLIPTA) 62.5-25 MCG/ACT inhaler 1 puff  1 puff Inhalation Daily Resp Dangelo, Navedeep, DO             RESULTS     CBC:  Recent Labs   Lab 10/19/23  0314 10/18/23  0336 10/15/23  0358   WBC 10.4 9.4 13.5*   HGB 12.9 12.9 12.6   HCT 39.0 39.2 37.3    318 291       BMP:  Recent Labs   Lab 10/20/23  0352 10/19/23  0314 10/18/23  0336   CO2 23 24 28   BUN 31* 31* 32*   CREATININE 0.66 0.57 0.60   GLUCOSE 188* 163* 162*   CALCIUM 8.8 8.8 8.8       LFTs:  No results found    Invalid input(s): \"ALKPHOS\", \"BILITOT\", \"BILIDIR\"    Coagulation:  No results found    Invalid input(s): \"PROTIME\"    Lab Results   Component Value Date    AST 61 (H) 10/13/2023    AST 22 06/20/2020       No results found for: \"AMYLASE\"    No results found for: \"TROPONINI\", \"CPK\", \"LACTATE\", \"LACTICACID\", \"FREET4\"    DIAGNOSTIC IMAGINING    My independent review:    TRANSTHORACIC ECHO (TTE) COMPLETE W/ W/O IMAGING AGENT    Result Date: 10/15/2023  Impression: *[facility]* [address] [address] [phone] Transthoracic  Echocardiogram (TTE) Patient: Ashlee Gant     Study Date/Time:        Oct 15 2023 1:29PM MRN:     [id number]             FIN#:                   [id number] :     1948          Ht/Wt:                  154.9cm 72.6kg Age:     74                  BSA/BMI:                1.79m^2 30.2kg/m^2 Gender:  F                   Baseline BP:            149 / 83 Ordering Physician:     Gisella Chou  Attending Physician:    Willian Marie  Diagnostic Physician:   Joel Dominguez MD Sonographer:            Lauren Villarreal  ------------------------------------------------------------------------------ INDICATIONS:   Shortness of Breath. ------------------------------------------------------------------------------ STUDY CONCLUSIONS SUMMARY: 1. Left ventricle: The cavity size is at the lower limits of normal. Wall    thickness is normal. Systolic function is normal. The ejection fraction was    measured by visual estimation. The ejection fraction is 55%. 2. Right ventricle: The cavity size is normal. 3. Atrial septum: Agitated saline contrast study shows no shunt. ------------------------------------------------------------------------------ STUDY DATA:  Patient room number: 5358-W.  Procedure:  A transthoracic echocardiogram was performed. The patient declined the use of an ultrasound imaging agent due to a previously documented reaction to Definity. Image quality was suboptimal. The study was technically limited due to restricted patient mobility and body habitus.  M-mode, complete 2D, complete spectral Doppler, and color Doppler.  Study status:  Routine.  Study completion:  There were no complications. FINDINGS LEFT VENTRICLE:  The cavity size is at the lower limits of normal. Wall thickness is normal. Systolic function is normal. Regional wall motion abnormalities cannot be excluded.    The ejection fraction was measured by visual estimation. The ejection fraction is 55%. AORTIC VALVE:  Poorly visualized. The annulus is  normal-sized and normal. Unable to determine morphology. The leaflets are normal thickness.  There is no stenosis.   There is no regurgitation. AORTA: Aortic root: The root is normal-sized. Ascending aorta: The vessel is normal-sized. MITRAL VALVE:  Poorly visualized. The annulus is normal-sized. The annulus is normal. The leaflets are normal thickness.  There is no evidence for stenosis.   There is trivial regurgitation. ATRIAL SEPTUM:   Agitated saline contrast study shows no shunt. LEFT ATRIUM:  The atrium is at the lower limits of normal size. RIGHT VENTRICLE:  The cavity size is normal. Systolic function is indeterminate. PULMONIC VALVE:   Poorly visualized. The annulus is normal-sized.  There is no evidence for stenosis.   There is no regurgitation. TRICUSPID VALVE:  Poorly visualized. The annulus is normal-sized.  There is no evidence for stenosis.   There is trivial regurgitation. RIGHT ATRIUM:  The atrium is normal in size.       The estimated central venous pressure is 8mm Hg. PERICARDIUM:  A trivial pericardial effusion is identified. ------------------------------------------------------------------------------ Measurements  Left ventricle         Value        Ref        Left atrium continued     Value          Ref  MALISSA, LAX chord     (L) 3.6   cm     3.8 - 5.2  Area ES, A4C          (N) 8     cm^2     <=20  ESD, LAX chord     (N) 2.6   cm     2.2 - 3.5  Area/bsa ES, A4C          4.71  cm^2/m^2 ---------  MALISSA/bsa, LAX chord (L) 2.0   cm/m^2 2.3 - 3.1  Vol, ES, 1-p A4C      (L) 18    ml       22 - 52  ESD/bsa, LAX chord (N) 1.4   cm/m^2 1.3 - 2.1  Vol/bsa, ES, 1-p A4C  (L) 10    ml/m^2   11 - 40  PW, ED, LAX        (H) 1.0   cm     0.6 - 0.9  IVS, ED            (N) 0.9   cm     0.6 - 0.9  Mitral valve              Value          Ref  PW, ED             (H) 1.0   cm     0.6 - 0.9  Peak E                    0.6   m/sec    ---------  IVS/PW, ED             0.85         ---------  Peak A                     0.9   m/sec    ---------  EDV                (N) 47    ml     46 - 106   Decel time                82    ms       ---------  ESV                (N) 18    ml     14 - 42    PHT                       24    ms       ---------  EF                 (N) 55    %      54 - 74    Peak E/A ratio            0.7            ---------  SV                     31    ml     ---------  EDV/bsa            (L) 26    ml/m^2 29 - 61    Tricuspid valve           Value          Ref  ESV/bsa            (N) 10    ml/m^2 8 - 24     TR peak v             (N) 1     m/sec    <=2.8  SV/bsa                 17    ml/m^2 ---------  Peak RV-RA grad, S        4     mm Hg    ---------  E', med venkata, TDI   (N) 7.4   cm/sec >=7.0  E/e', med venkata, TDI     8            ---------  Aortic root               Value          Ref                                                 Root diam, ED         (L) 1.9   cm       2.5 - 4.0  Right ventricle        Value        Ref  MALISSA, LAX               2.5   cm     ---------  Pulmonary artery          Value          Ref                                                 Pressure, S               12    mm Hg    ---------  Left atrium            Value        Ref  AP dim, ES         (N) 3.3   cm     2.7 - 3.8  Systemic veins            Value          Ref  AP dim index, ES   (N) 1.8   cm/m^2 1.5 - 2.3  Estimated CVP             8     mm Hg    --------- Legend: (L)  and  (H)  dean values outside specified reference range. (N)  marks values inside specified reference range. Prepared and electronically signed by Joel Dominguez MD 10/15/2023 16:49    XR CHEST AP OR PA    Result Date: 10/13/2023  Narrative: HISTORY:Shortness of breath. 5' 1\" ft 160.05 lb Single AP portable  upright   view of the chest comparison: October 12, 2023 Cardiac and mediastinal silhouette is normal.  There is no lobar consolidation.   There is no pleural effusion. Pulmonary vascularity is normal. There are no pulmonary nodules or masses. Bones and soft tissues  are unremarkable. Remainder of the exam is otherwise unremarkable.     Impression: No acute disease Electronically Signed by: MELISSA RAMIREZ MD Signed on: 10/13/2023 8:58 PM Workstation ID: [id number]    XR CHEST AP OR PA    Result Date: 10/12/2023  Narrative: EXAMINATION: Chest Radiograph, anteroposterior view HISTORY: Dyspnea. COMPARISON: 10/11/2023. FINDINGS: The lungs appear hyperinflated. No abnormal pulmonary opacity is identified. No pleural effusion or pneumothorax is seen. The heart size is normal. The mediastinal contour is normal. The osseous structures are intact.     Impression: 1. No acute pulmonary process. Electronically Signed by: HARLAN WHEAT MD Signed on: 10/12/2023 12:00 PM Workstation ID: [id number]    XR CHEST AP OR PA    Result Date: 10/11/2023  Narrative: History: Respiratory distress Exam: XR CHEST AP OR PA. Comparison: 1/22/2023. Findings: The cardiac silhouette is normal. No focal consolidations, pleural effusions, or pneumothorax. No acute osseous abnormality.     Impression: Impression: No acute cardiopulmonary abnormality. Electronically Signed by: JEREMIAH SR MD Signed on: 10/11/2023 12:20 PM Workstation ID: [id number]

## 2023-11-13 ENCOUNTER — OFFICE VISIT (OUTPATIENT)
Dept: OBGYN | Facility: CLINIC | Age: 38
End: 2023-11-13
Payer: COMMERCIAL

## 2023-11-13 VITALS
DIASTOLIC BLOOD PRESSURE: 77 MMHG | OXYGEN SATURATION: 96 % | WEIGHT: 122.4 LBS | BODY MASS INDEX: 20.9 KG/M2 | HEART RATE: 67 BPM | SYSTOLIC BLOOD PRESSURE: 110 MMHG | HEIGHT: 64 IN

## 2023-11-13 DIAGNOSIS — R30.0 DYSURIA: Primary | ICD-10-CM

## 2023-11-13 LAB
ALBUMIN UR-MCNC: NEGATIVE MG/DL
APPEARANCE UR: CLEAR
BILIRUB UR QL STRIP: NEGATIVE
COLOR UR AUTO: YELLOW
GLUCOSE UR STRIP-MCNC: NEGATIVE MG/DL
HGB UR QL STRIP: ABNORMAL
KETONES UR STRIP-MCNC: NEGATIVE MG/DL
LEUKOCYTE ESTERASE UR QL STRIP: NEGATIVE
MUCOUS THREADS #/AREA URNS LPF: PRESENT /LPF
NITRATE UR QL: NEGATIVE
PH UR STRIP: 6.5 [PH] (ref 5–7)
RBC #/AREA URNS AUTO: ABNORMAL /HPF
SP GR UR STRIP: 1.02 (ref 1–1.03)
SQUAMOUS #/AREA URNS AUTO: ABNORMAL /LPF
UROBILINOGEN UR STRIP-ACNC: 0.2 E.U./DL
WBC #/AREA URNS AUTO: ABNORMAL /HPF

## 2023-11-13 PROCEDURE — 99213 OFFICE O/P EST LOW 20 MIN: CPT | Performed by: OBSTETRICS & GYNECOLOGY

## 2023-11-13 PROCEDURE — 81001 URINALYSIS AUTO W/SCOPE: CPT | Performed by: OBSTETRICS & GYNECOLOGY
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
## 2023-11-14 ENCOUNTER — TRANSFERRED RECORDS (OUTPATIENT)
Dept: HEALTH INFORMATION MANAGEMENT | Facility: CLINIC | Age: 38
End: 2023-11-14
Payer: COMMERCIAL

## 2024-03-06 ENCOUNTER — E-VISIT (OUTPATIENT)
Dept: URGENT CARE | Facility: CLINIC | Age: 39
End: 2024-03-06
Payer: COMMERCIAL

## 2024-03-06 DIAGNOSIS — J01.90 ACUTE SINUSITIS WITH SYMPTOMS > 10 DAYS: Primary | ICD-10-CM

## 2024-03-06 PROCEDURE — 99421 OL DIG E/M SVC 5-10 MIN: CPT | Performed by: NURSE PRACTITIONER

## 2024-03-06 NOTE — PATIENT INSTRUCTIONS
Acute Sinusitis: Care Instructions  Overview     Acute sinusitis is an inflammation of the mucous membranes inside the nose and sinuses. Sinuses are the hollow spaces in your skull around the eyes and nose. Acute sinusitis often follows a cold. Acute sinusitis causes thick, discolored mucus that drains from the nose or down the back of the throat. It also can cause pain and pressure in your head and face along with a stuffy or blocked nose.  In most cases, sinusitis gets better on its own in 1 to 2 weeks. But some mild symptoms may last for several weeks. Sometimes antibiotics are needed if there is a bacterial infection.  Follow-up care is a key part of your treatment and safety. Be sure to make and go to all appointments, and call your doctor if you are having problems. It's also a good idea to know your test results and keep a list of the medicines you take.  How can you care for yourself at home?  Use saline (saltwater) nasal washes. This can help keep your nasal passages open and wash out mucus and allergens.  You can buy saline nose washes at a grocery store or drugstore. Follow the instructions on the package.  You can make your own at home. Add 1 teaspoon of non-iodized salt and 1 teaspoon of baking soda to 2 cups of distilled or boiled and cooled water. Fill a squeeze bottle or a nasal cleansing pot (such as a neti pot) with the nasal wash. Then put the tip into your nostril, and lean over the sink. With your mouth open, gently squirt the liquid. Repeat on the other side.  Try a decongestant nasal spray like oxymetazoline (Afrin). Do not use it for more than 3 days in a row. Using it for more than 3 days can make your congestion worse.  If needed, take an over-the-counter pain medicine, such as acetaminophen (Tylenol), ibuprofen (Advil, Motrin), or naproxen (Aleve). Read and follow all instructions on the label.  If the doctor prescribed antibiotics, take them as directed. Do not stop taking them just  "because you feel better. You need to take the full course of antibiotics.  Be careful when taking over-the-counter cold or flu medicines and Tylenol at the same time. Many of these medicines have acetaminophen, which is Tylenol. Read the labels to make sure that you are not taking more than the recommended dose. Too much acetaminophen (Tylenol) can be harmful.  Try a steroid nasal spray. It may help with your symptoms.  Breathe warm, moist air. You can use a steamy shower, a hot bath, or a sink filled with hot water. Avoid cold, dry air. Using a humidifier in your home may help. Follow the directions for cleaning the machine.  When should you call for help?   Call your doctor now or seek immediate medical care if:    You have new or worse swelling, redness, or pain in your face or around one or both of your eyes.     You have double vision or a change in your vision.     You have a high fever.     You have a severe headache and a stiff neck.     You have mental changes, such as feeling confused or much less alert.   Watch closely for changes in your health, and be sure to contact your doctor if:    You are not getting better as expected.   Where can you learn more?  Go to https://www.[employer].net/patiented  Enter I933 in the search box to learn more about \"Acute Sinusitis: Care Instructions.\"  Current as of: February 28, 2023               Content Version: 13.8    8313-5044 [employer].   Care instructions adapted under license by your healthcare professional. If you have questions about a medical condition or this instruction, always ask your healthcare professional. [employer] disclaims any warranty or liability for your use of this information.      Dear Mavis Krishnamurthy    After reviewing your responses, I've been able to diagnose you with Acute sinusitis with symptoms > 10 days.      Based on your responses and diagnosis, I have prescribed   Orders Placed This Encounter   Medications "     amoxicillin-clavulanate (AUGMENTIN) 875-125 MG tablet     Sig: Take 1 tablet by mouth 2 times daily for 7 days     Dispense:  14 tablet     Refill:  0      to treat your symptoms. I have sent this to your pharmacy.?     It is also important to stay well hydrated, get lots of rest and take over-the-counter decongestants,?tylenol?or ibuprofen if you?are able to?take those medications per your primary care provider to help relieve discomfort.?     It is important that you take?all of?your prescribed medication even if your symptoms are improving after a few doses.? Taking?all of?your medicine helps prevent the symptoms from returning.?     If your symptoms worsen, you develop severe headache, vomiting, high fever (>102), or are not improving in 7 days, please contact your primary care provider for an appointment or visit any of our convenient Walk-in Care or Urgent Care Centers to be seen which can be found on our website?here.?     Thanks again for choosing?us?as your health care partner,?   ?  Arian Mario NP?   Thank you for choosing us for your care. I have placed an order for a prescription so that you can start treatment. View your full visit summary for details by clicking on the link below. Your pharmacist will able to address any questions you may have about the medication.     If you're not feeling better within 5-7 days, please schedule an appointment.  You can schedule an appointment right here in [facility], or call [phone]  If the visit is for the same symptoms as your eVisit, we'll refund the cost of your eVisit if seen within seven days.

## 2024-03-26 ENCOUNTER — TRANSFERRED RECORDS (OUTPATIENT)
Dept: HEALTH INFORMATION MANAGEMENT | Facility: CLINIC | Age: 39
End: 2024-03-26
Payer: COMMERCIAL

## 2024-05-10 ENCOUNTER — PATIENT OUTREACH (OUTPATIENT)
Dept: CARE COORDINATION | Facility: CLINIC | Age: 39
End: 2024-05-10
Payer: COMMERCIAL

## 2024-05-20 NOTE — PATIENT INSTRUCTIONS
If you have any questions regarding your visit, Please contact your care team.    [facility]: [phone]      Women s Health CLINIC HOURS TELEPHONE NUMBER   Ekaterina Ibarra DO.    PRESTON Gordon-Surgery Scheduler  Melina - Surgery Scheduler    MATTIE Rangel RN Kylie, RN     Monday, Thursday  [place]  7am-3pm    Tuesday, Wednesday  [place]  7am-3pm    Friday  [place]  1pm-3:30pm    Typical Surgery Days: Thursday or Friday   [facility]  [address] [place] [address] [phone] Phone  [phone] Fax    [facility]  [address] [place] [address] [phone] Phone    Imaging [phone] Phone    [facility]:  [phone] Phone     **Surgeries** Our Surgery Schedulers will contact you to schedule. If you do not receive a call within 3 business days, please call [phone].    Urgent Care locations:  [facility] Saturday and    9 am - 5 pm    Monday-Friday   12 pm - 8 pm  Saturday and    9 am - 5 pm   [phone]       If you need a medication refill, please contact your pharmacy. Please allow 3 business days for your refill to be completed.  As always, Thank you for trusting us with your healthcare needs!        [email or web address]

## 2024-05-21 ENCOUNTER — OFFICE VISIT (OUTPATIENT)
Dept: OBGYN | Facility: CLINIC | Age: 39
End: 2024-05-21
Payer: COMMERCIAL

## 2024-05-21 VITALS
BODY MASS INDEX: 21.73 KG/M2 | HEART RATE: 73 BPM | SYSTOLIC BLOOD PRESSURE: 119 MMHG | WEIGHT: 126.6 LBS | DIASTOLIC BLOOD PRESSURE: 78 MMHG

## 2024-05-21 DIAGNOSIS — Z90.710 HISTORY OF HYSTERECTOMY FOR BENIGN DISEASE: ICD-10-CM

## 2024-05-21 DIAGNOSIS — Z80.41 FAMILY HISTORY OF MALIGNANT NEOPLASM OF OVARY: ICD-10-CM

## 2024-05-21 DIAGNOSIS — G25.81 RESTLESS LEGS: ICD-10-CM

## 2024-05-21 DIAGNOSIS — Z01.419 WELL WOMAN EXAM WITH ROUTINE GYNECOLOGICAL EXAM: Primary | ICD-10-CM

## 2024-05-21 DIAGNOSIS — R23.2 VASOMOTOR FLUSHING: ICD-10-CM

## 2024-05-21 LAB
ERYTHROCYTE [DISTWIDTH] IN BLOOD BY AUTOMATED COUNT: 12.8 % (ref 10–15)
HCT VFR BLD AUTO: 37.6 % (ref 35–47)
HGB BLD-MCNC: 12.7 G/DL (ref 11.7–15.7)
MCH RBC QN AUTO: 30.5 PG (ref 26.5–33)
MCHC RBC AUTO-ENTMCNC: 33.8 G/DL (ref 31.5–36.5)
MCV RBC AUTO: 90 FL (ref 78–100)
PLATELET # BLD AUTO: 238 10E3/UL (ref 150–450)
RBC # BLD AUTO: 4.16 10E6/UL (ref 3.8–5.2)
WBC # BLD AUTO: 7.6 10E3/UL (ref 4–11)

## 2024-05-21 PROCEDURE — 84443 ASSAY THYROID STIM HORMONE: CPT | Performed by: OBSTETRICS & GYNECOLOGY

## 2024-05-21 PROCEDURE — 82728 ASSAY OF FERRITIN: CPT | Performed by: OBSTETRICS & GYNECOLOGY

## 2024-05-21 PROCEDURE — 36415 COLL VENOUS BLD VENIPUNCTURE: CPT | Performed by: OBSTETRICS & GYNECOLOGY

## 2024-05-21 PROCEDURE — 85027 COMPLETE CBC AUTOMATED: CPT | Performed by: OBSTETRICS & GYNECOLOGY

## 2024-05-21 PROCEDURE — 99395 PREV VISIT EST AGE 18-39: CPT | Performed by: OBSTETRICS & GYNECOLOGY

## 2024-05-21 PROCEDURE — 99213 OFFICE O/P EST LOW 20 MIN: CPT | Mod: 25 | Performed by: OBSTETRICS & GYNECOLOGY

## 2024-05-21 NOTE — PROGRESS NOTES
SUBJECTIVE:       HPI: Mavis Krishnamurthy is a 39 year old  who presents today for WWE.     Has been having intermittent hot flashes and night sweats in the last month or so. This is new for her.  Hx migraines with aura.      Ob Hx:  s/p   OB History    Para Term  AB Living   3 3 3 0 0 3   SAB IAB Ectopic Multiple Live Births   0 0 0 0 3      # Outcome Date GA Lbr Jabari/2nd Weight Sex Type Anes PTL Lv   3 Term 19 39w0d 02:54 / 00:08 3.14 kg (6 lb 14.8 oz) M Vag-Spont EPI N VINCENT      Name: SATHISH KRISHNAMURTHYBOY      Apgar1: 8  Apgar5: 8   2 Term 17 37w2d 08:42 / 00:12 3.09 kg (6 lb 13 oz) M Vag-Spont EPI N VINCENT      Name: SATHISH KRISHNAMURTHY BOY      Apgar1: 8  Apgar5: 8   1 Term 04/15/13 37w0d 05:50 / 01:50 2.585 kg (5 lb 11.2 oz) M Vag-Spont EPI  VINCENT      Name: LOBITO,TRUNG RICHMOND      Apgar1: 8  Apgar5: 9    .      Gyn Hx: Patient's last menstrual period was 2020 (approximate).    Patient is sexually active. One male partner   Last pap was prior to hyst. No hx high grade dysplasia  Lab Results   Component Value Date    PAP NIL neg hpv 2018    PAP NIL 2015    PAP NIL 10/15/2012     Using hysterectomy for contraception.   STD testing offered?  Declined  Family history of gyn-related malignancies: Maternal aunts x2 with ovarian cancer in their 50-60s. PGM with breast cancer >69yo.  Her and her mother had testing through a clinical trial several years ago, possible for BRCA and that testing was negative. Unsure exactly of what testing was done.         reports that she has never smoked. She has never used smokeless tobacco.      Today's PHQ-2 Score:       2024     1:14 PM   PHQ-2 (  [employer])   Q1: Little interest or pleasure in doing things 0   Q2: Feeling down, depressed or hopeless 0   PHQ-2 Score 0   Q1: Little interest or pleasure in doing things Not at all   Q2: Feeling down, depressed or hopeless Not at all   PHQ-2 Score 0     Today's PHQ-9 Score:       2019     9:11 AM    PHQ-9 SCORE   PHQ-9 Total Score MyChart 1 (Minimal depression)   PHQ-9 Total Score 1     Today's RUPAL-7 Score:       4/22/2014     7:29 PM   RUPAL-7 SCORE   Total Score 2       Problem list and histories reviewed & adjusted, as indicated.  Additional history: as documented.    Patient Active Problem List   Diagnosis    Allergic rhinitis    IBS (irritable bowel syndrome)    Chronic maxillary sinusitis    Lactose intolerance    Migraine    Abnormal antinuclear antibody titer    Bicuspid aortic valve    Esophageal inlet patch    Hx of nasal polypectomy    Restless legs     Past Surgical History:   Procedure Laterality Date    COLONOSCOPY WITH CO2 INSUFFLATION N/A 06/18/2015    Procedure: COLONOSCOPY WITH CO2 INSUFFLATION;  Surgeon: Angel Thomas MD;  Location: [facility] OR    ENDOSCOPIC BALLOON SINUPLASTY, OPTICAL TRACKING SYSTEM ENDOSCOPIC SINUS SURGERY, COMBINED  11/07/2013    Procedure: COMBINED ENDOSCOPIC BALLOON SINUPLASTY, OPTICAL TRACKING SYSTEM ENDOSCOPIC SINUS SURGERY;  Bilateral Endoscopic Ethmoidectomy, Maxillary Antrostomy, Frontal Sinusototmy with Navigation and Balloon and Propel Implants;  Surgeon: Vasquez Corona MD;  Location: [facility] OR    ENDOSCOPY  10/2017    Of throat, burned part off, [place] [facility]    EXAM OF VAGINA,COLPOSCOPY      X -2    HYSTERECTOMY      LAPAROSCOPIC HYSTERECTOMY TOTAL, SALPINGECTOMY BILATERAL  02/2020    menorrhagia,     MOUTH SURGERY  2009    [facility]     PHOTOREFRACTIVE KERATECTOMY      Oct and Dec 2015    UPPER GI ENDOSCOPY        Social History     Tobacco Use    Smoking status: Never    Smokeless tobacco: Never   Substance Use Topics    Alcohol use: No      Problem (# of Occurrences) Relation (Name,Age of Onset)    Alzheimer Disease (1) Maternal Grandmother (80)    Heart Disease (1) Father: open heart for endocarditis    Hypertension (1) Mother (Mom)    Neurologic Disorder (3) Father (16): migraines, Paternal Grandmother: migraines unknown age, Sister (Jahaira, 20):  "migraines    Rheumatoid Arthritis (1) Sister              Current Outpatient Medications   Medication Sig Dispense Refill    fluticasone (FLONASE) 50 MCG/ACT nasal spray Spray 1 spray into both nostrils daily      albuterol (PROAIR HFA/PROVENTIL HFA/VENTOLIN HFA) 108 (90 Base) MCG/ACT inhaler Inhale 2 puffs into the lungs every 6 hours as needed for shortness of breath, wheezing or cough (Patient not taking: Reported on 5/21/2024) 18 g 1    Probiotic Product (PROBIOTIC ADVANCED PO)  (Patient not taking: Reported on 5/21/2024)       No current facility-administered medications for this visit.     Allergies   Allergen Reactions    Bactrim [Sulfamethoxazole-Trimethoprim]      Tongue burning, tingling,     Mold     Seasonal Allergies     Trimethoprim      Other reaction(s): \"tongue burning and tingling\"       ROS:  10 Point review of systems negative other noted above in HPI    OBJECTIVE:     /78   Pulse 73   Wt 57.4 kg (126 lb 9.6 oz)   LMP 01/09/2020 (Approximate)   BMI 21.73 kg/m    Body mass index is 21.73 kg/m .      Gen: Alert, oriented, appropriately interactive, NAD  Eyes: Eyes grossly normal to inspection, conjunctivae and sclerae normal  CV: No edema  Resp: no audible wheeze, cough, or visible cyanosis.  No visible retractions or increased work of breathing.  Able to speak fully in complete sentences.  Breasts: no axillary adenopathy, no dominant masses, no skin changes, no nipple discharge, nontender  Abdomen: soft, non tender, non distended, no masses, no hernias.   External genitalia: no lesions; normal appearing external genitalia, bartholins glands, urethra, skenes glands  Vagina: no masses or lesions or discharge, mildly pale  Cervix: surgically absent  Bimanual exam:   Nontender pelvic floor muscles  Urethra: nontender   Bladder: nontender and without massess, well supported   Uterus: surgically absent  Adnexa: no masses or tenderness appreciated   MSK: normal gait, symmetric movements UE & " LE  Lower extremities: non-tender, no edema  Neuro: Cranial nerves grossly intact, mentation intact and speech normal  Psych: mentation appears normal, affect normal/bright, judgement and insight intact, normal speech and appearance well-groomed      In-Clinic Test Results:  No results found for this or any previous visit (from the past 24 hour(s)).    ASSESSMENT/PLAN:                                                      Mavis Krishnamurthy is a 39 year old  who presents today for WWE      ICD-10-CM    1. Well woman exam with routine gynecological exam  Z01.419       2. History of hysterectomy for benign disease  Z90.710       3. Family history of malignant neoplasm of ovary  Z80.41 Adult Oncology/Hematology  Referral      4. Vasomotor flushing  R23.2 CBC with platelets     TSH with free T4 reflex          Screening pap smears no longer recommended per guidelines  STI screen- declined  Screening mammogram- due at 40yo  Fhx ovarian cancer with possible BRCA testing in the past through a trial- offered formal genetic counseling, which she accepted. Referral placed.  Hot flashes- NAMS website reviewed as well as conservative measures. Discussed option to screen with FSH but limitations of this and options for management. Baseline labs ordered as well. Recommend follow-up after work-up complete to discuss option for HRT if desired based on results. All questions answered      Ekaterina Ibarra [facility]

## 2024-05-22 ENCOUNTER — PATIENT OUTREACH (OUTPATIENT)
Dept: ONCOLOGY | Facility: CLINIC | Age: 39
End: 2024-05-22
Payer: COMMERCIAL

## 2024-05-22 LAB
FERRITIN SERPL-MCNC: 50 NG/ML (ref 6–175)
TSH SERPL DL<=0.005 MIU/L-ACNC: 0.75 UIU/ML (ref 0.3–4.2)

## 2024-05-22 NOTE — PROGRESS NOTES
This is what patient has access to:        New Patient Oncology Nurse Navigator Note     Referring provider[facility]ater[facility]    Referred to (specialty):  Genetic Counseling    Date Referral Received: 5/22/2024     Evaluation for: family history of ovarian cancer  Family history of gyn-related malignancies: Maternal aunts x2 with ovarian cancer in their 50-60s. PGM with breast cancer >71yo.  Her and her mother had testing through a clinical trial several years ago, possible for BRCA and that testing was negative. Unsure exactly of what testing was done.     RECORDS NEEDED: Can we find out and get copies of previous labwork?  Her and her mother had testing through a clinical trial several years ago, possible for BRCA and that testing was negative. Unsure exactly of what testing was done.

## 2024-05-23 ENCOUNTER — PRE VISIT (OUTPATIENT)
Dept: ONCOLOGY | Facility: CLINIC | Age: 39
End: 2024-05-23
Payer: COMMERCIAL

## 2024-05-23 NOTE — TELEPHONE ENCOUNTER
RECORDS STATUS - ALL OTHER DIAGNOSIS      Referring Provider/Location:   Ekaterina Ibarra DOAn   Dx and Code: Genetic counseling  Appt Date:  TBD  Provider: CODY   Action Taken  Date/Description  TJ     Pre-Visit May 23, 2024  3:10 PM   Clinical Trial Tests in 2018 @ [facility] and she has the results and is Emailing a screenshot now.  I received the document and sent to the NN.  Doesn't have a breakdown of results so more may be needed.  She is going to try to upload to [employer] ([employer]) to see if that provides more info.  IB to NN     RECORDS NEEDED: Labs   NOTES STATUS COMMENTS   OFFICE NOTE from referring provider     OFFICE NOTE from medical oncologist     OFFICE NOTE from other specialist     DISCHARGE SUMMARY from hospital     DISCHARGE REPORT from the ER     OPERATIVE REPORT     MEDICATION LIST     LABS     PATHOLOGY REPORTS     ANYTHING RELATED TO DIAGNOSIS Epic 5.21.24 Most recent labs   GENONOMIC TESTING     TYPE:     IMAGING (NEED IMAGES & REPORT)     CT SCANS     MRI     XRAYS     ULTRASOUND     PET

## 2024-05-24 ENCOUNTER — PATIENT OUTREACH (OUTPATIENT)
Dept: CARE COORDINATION | Facility: CLINIC | Age: 39
End: 2024-05-24
Payer: COMMERCIAL

## 2024-05-29 NOTE — PROGRESS NOTES
I rec'd a message from NPS (new patient scheduling) team asking me to phone Mavis to answer her questions.  Mavis is wondering why she needs to see genetic counseling when she had testing done already.  I had to leave her a vm.  I explained it is up to her if she wants to be seen again.  I realize she has had some good testing done already and those results won't change, however, it has been about 6 years since her testing and genetics has certainly expanded during that time so I cannot say for sure whether or not she could have any further testing for her family history of cancers.  I left my direct contact information and asked her to call me if she has any further questions and/or if she'd like to schedule an appointment.

## 2024-05-30 ENCOUNTER — OFFICE VISIT (OUTPATIENT)
Dept: URGENT CARE | Facility: URGENT CARE | Age: 39
End: 2024-05-30
Payer: COMMERCIAL

## 2024-05-30 VITALS
WEIGHT: 123.38 LBS | RESPIRATION RATE: 16 BRPM | DIASTOLIC BLOOD PRESSURE: 80 MMHG | BODY MASS INDEX: 21.18 KG/M2 | TEMPERATURE: 98.7 F | SYSTOLIC BLOOD PRESSURE: 121 MMHG | HEART RATE: 102 BPM | OXYGEN SATURATION: 98 %

## 2024-05-30 DIAGNOSIS — F41.9 ANXIETY: Primary | ICD-10-CM

## 2024-05-30 DIAGNOSIS — R03.0 ELEVATED BLOOD PRESSURE READING WITHOUT DIAGNOSIS OF HYPERTENSION: ICD-10-CM

## 2024-05-30 PROCEDURE — 99213 OFFICE O/P EST LOW 20 MIN: CPT | Performed by: FAMILY MEDICINE

## 2024-05-30 RX ORDER — LORAZEPAM 0.5 MG/1
TABLET ORAL
Qty: 20 TABLET | Refills: 1 | Status: SHIPPED | OUTPATIENT
Start: 2024-05-30

## 2024-05-30 ASSESSMENT — ANXIETY QUESTIONNAIRES
6. BECOMING EASILY ANNOYED OR IRRITABLE: NOT AT ALL
IF YOU CHECKED OFF ANY PROBLEMS ON THIS QUESTIONNAIRE, HOW DIFFICULT HAVE THESE PROBLEMS MADE IT FOR YOU TO DO YOUR WORK, TAKE CARE OF THINGS AT HOME, OR GET ALONG WITH OTHER PEOPLE: VERY DIFFICULT
2. NOT BEING ABLE TO STOP OR CONTROL WORRYING: NOT AT ALL
1. FEELING NERVOUS, ANXIOUS, OR ON EDGE: SEVERAL DAYS
5. BEING SO RESTLESS THAT IT IS HARD TO SIT STILL: MORE THAN HALF THE DAYS
7. FEELING AFRAID AS IF SOMETHING AWFUL MIGHT HAPPEN: NOT AT ALL
GAD7 TOTAL SCORE: 5
GAD7 TOTAL SCORE: 5
3. WORRYING TOO MUCH ABOUT DIFFERENT THINGS: SEVERAL DAYS

## 2024-05-30 ASSESSMENT — PATIENT HEALTH QUESTIONNAIRE - PHQ9: 5. POOR APPETITE OR OVEREATING: SEVERAL DAYS

## 2024-05-30 NOTE — PROGRESS NOTES
(F41.9) Anxiety  (primary encounter diagnosis)  Comment:     Patient with a busy life in general.  Family history of anxiety in her father noted.  Increasing job stress, especially in the last month.    Plan: LORazepam (ATIVAN) 0.5 MG tablet, Primary Care         Referral        I would like to have her follow-up with primary care.  I did ask her to see what her father took in the past for anxiety and ask whether it was helpful.  She is already taking some steps to possibly find a different position.      (R03.0) Elevated blood pressure reading without diagnosis of hypertension  Comment:     Blood pressure obtained in clinic basically normal.    Plan:       CHIEF COMPLAINT    Peculiar feeling today, question of elevated blood pressure.      HISTORY    Patient describes feeling weird and somewhat nauseated today.  Having trouble sleeping.  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    She has 3 children and is .    Father had issues of anxiety.    Not otherwise feeling ill.  Does have a history of bicuspid aortic valve which is followed.      REVIEW OF SYSTEMS    No fever or chills.  No sore throat or congestion.  No cough or SOB.  No chest pain or palpitations.  No edema.  No abdominal pain.  No rashes.  No numbness or weakness.      EXAM  /80   Pulse 102   Temp 98.7  F (37.1  C) (Tympanic)   Resp 16   Wt 56 kg (123 lb 6 oz)   LMP 01/09/2020 (Approximate)   SpO2 98%   BMI 21.18 kg/m      She is a thin, well-appearing woman.  HEENT unremarkable.  Neck no thyromegaly or adenopathy.  Lungs clear.  Cardiac RSR without obvious murmur.  Abdomen nontender.  Speech, motor, gait WNL.    RUPAL-7 reviewed.  She does not score very high but describes her life as very difficult currently.    CBC and thyroid function done a few days ago were WNL.

## 2024-06-06 ENCOUNTER — TELEPHONE (OUTPATIENT)
Dept: FAMILY MEDICINE | Facility: CLINIC | Age: 39
End: 2024-06-06

## 2024-06-06 NOTE — TELEPHONE ENCOUNTER
Appointment made for 4:14 today with Dr Ceja. Left message on patient's voicemail with appointment time.Cecy Yang [facility]

## 2024-06-06 NOTE — TELEPHONE ENCOUNTER
Patient called back, today will not work, she has a child graduating today. Reschedule to Monday 6/10/24.Cecy Yang [facility]

## 2024-06-06 NOTE — TELEPHONE ENCOUNTER
Cam we use your approval spot this afternoon for her?Cecy HERNÁNDEZ [facility]

## 2024-06-06 NOTE — TELEPHONE ENCOUNTER
Reason for Call:  Appointment Request    Patient requesting this type of appt:  Hospital/ED Follow-Up     Requested provider: Leo Ceja    Reason patient unable to be scheduled: Not within requested timeframe    When does patient want to be seen/preferred time: Same day    Comments: Pt was seen in  on 5/30/24 and wants to see pcp for bp issues. Please advise on same day slot    Could we send this information to you in [facility] or would you prefer to receive a phone call?:   Patient would prefer a phone call   Okay to leave a detailed message?: Yes at Cell number on file:    Telephone Information:   Mobile [phone]       Call taken on 6/6/2024 at 7:14 AM by Nadia García

## 2024-06-10 ENCOUNTER — OFFICE VISIT (OUTPATIENT)
Dept: FAMILY MEDICINE | Facility: CLINIC | Age: 39
End: 2024-06-10
Payer: COMMERCIAL

## 2024-06-10 VITALS
SYSTOLIC BLOOD PRESSURE: 137 MMHG | BODY MASS INDEX: 20.89 KG/M2 | HEIGHT: 65 IN | DIASTOLIC BLOOD PRESSURE: 76 MMHG | OXYGEN SATURATION: 100 % | WEIGHT: 125.4 LBS | TEMPERATURE: 98.8 F | RESPIRATION RATE: 16 BRPM | HEART RATE: 93 BPM

## 2024-06-10 DIAGNOSIS — F41.9 ANXIETY: ICD-10-CM

## 2024-06-10 PROCEDURE — 99213 OFFICE O/P EST LOW 20 MIN: CPT | Performed by: FAMILY MEDICINE

## 2024-06-10 ASSESSMENT — PAIN SCALES - GENERAL: PAINLEVEL: NO PAIN (0)

## 2024-06-10 NOTE — PROGRESS NOTES
"  ASSESSMENT / PLAN:  (F41.9) Anxiety  Comment: needs help. Mainly work stress  Plan: sertraline (ZOLOFT) 50 MG tablet, Adult Mental         Health  Referral        Patient would like to try zoloft. Reveiwed risks and side effects of medication  Continue prn ativan for sleep. Exercise and medidation and follow-up therapist. If SUICIAL IDEATION OR HOMOCIDAL IDEATION OR SULEMA TO ER. Call/email with questions/concerns  Consider lexapro too.       Arcelia Gamble is a 39 year old, presenting for the following health issues:  No chief complaint on file.  Follow-up urgent care visit for anxiety. Increased stress job and family history anxiety.   Given prn ativan. Normal cbc/tsh.  3 boys 11,7,5.  Home good.   Stressful past couple year - worsening past 2 months. Same people but different leadership - new /. Some friends work.   Limited hobbies. Kids in sports - football/basketball/baseball.  Grandma helpful.   Ativan was helpful for sleep.   Bedtime - reading at bedtime. Cool day/quiet.   No SUICIAL IDEATION OR HOMOCIDAL IDEATION OR SULEMA.  No daily psych meds.   No therapist. One coffee in AM. No ALCOHOL regularly.   No daily med.   Working 70s/week. Calm ciara.   Looking around for possibly new job.         6/10/2024    10:41 AM   Additional Questions   Roomed by CRISTHIAN Molina CMA     [facility]       ED/UC Followup:    Facility:  [facility]  Date of visit: 5/30/2024  Reason for visit: Anxiety  Current Status: no change          Objective    LMP 01/09/2020 (Approximate)   There is no height or weight on file to calculate BMI.  /76   Pulse 93   Temp 98.8  F (37.1  C) (Oral)   Resp 16   Ht 1.651 m (5' 5\")   Wt 56.9 kg (125 lb 6.4 oz)   LMP 01/09/2020 (Approximate)   SpO2 100%   BMI 20.87 kg/m       Physical Exam   GENERAL: alert and no distress  EYES: Eyes grossly normal to inspection, PERRL and conjunctivae and sclerae normal  HENT: ear canals and TM's normal, nose and mouth without ulcers or " lesions  NECK: no adenopathy, no asymmetry, masses, or scars  RESP: lungs clear to auscultation - no rales, rhonchi or wheezes  CV: regular rate and rhythm, normal S1 S2, no S3 or S4, no murmur, click or rub, no peripheral edema   MS: no gross musculoskeletal defects noted, no edema  NEURO: Normal strength and tone, mentation intact and speech normal  PSYCH: mentation appears normal, affect normal/bright  PSYCH: anxious            Signed Electronically by: Leo Ceja MD

## 2024-06-19 ENCOUNTER — VIRTUAL VISIT (OUTPATIENT)
Dept: PSYCHOLOGY | Facility: CLINIC | Age: 39
End: 2024-06-19
Attending: FAMILY MEDICINE
Payer: COMMERCIAL

## 2024-06-19 DIAGNOSIS — F41.9 ANXIETY: ICD-10-CM

## 2024-06-19 PROCEDURE — 90791 PSYCH DIAGNOSTIC EVALUATION: CPT | Mod: 95 | Performed by: COUNSELOR

## 2024-06-19 ASSESSMENT — ANXIETY QUESTIONNAIRES
8. IF YOU CHECKED OFF ANY PROBLEMS, HOW DIFFICULT HAVE THESE MADE IT FOR YOU TO DO YOUR WORK, TAKE CARE OF THINGS AT HOME, OR GET ALONG WITH OTHER PEOPLE?: VERY DIFFICULT
5. BEING SO RESTLESS THAT IT IS HARD TO SIT STILL: NEARLY EVERY DAY
6. BECOMING EASILY ANNOYED OR IRRITABLE: NEARLY EVERY DAY
IF YOU CHECKED OFF ANY PROBLEMS ON THIS QUESTIONNAIRE, HOW DIFFICULT HAVE THESE PROBLEMS MADE IT FOR YOU TO DO YOUR WORK, TAKE CARE OF THINGS AT HOME, OR GET ALONG WITH OTHER PEOPLE: VERY DIFFICULT
2. NOT BEING ABLE TO STOP OR CONTROL WORRYING: NEARLY EVERY DAY
GAD7 TOTAL SCORE: 18
7. FEELING AFRAID AS IF SOMETHING AWFUL MIGHT HAPPEN: NOT AT ALL
7. FEELING AFRAID AS IF SOMETHING AWFUL MIGHT HAPPEN: NOT AT ALL
3. WORRYING TOO MUCH ABOUT DIFFERENT THINGS: NEARLY EVERY DAY
1. FEELING NERVOUS, ANXIOUS, OR ON EDGE: NEARLY EVERY DAY
GAD7 TOTAL SCORE: 18
4. TROUBLE RELAXING: NEARLY EVERY DAY
GAD7 TOTAL SCORE: 18

## 2024-06-19 ASSESSMENT — PATIENT HEALTH QUESTIONNAIRE - PHQ9
SUM OF ALL RESPONSES TO PHQ QUESTIONS 1-9: 10
10. IF YOU CHECKED OFF ANY PROBLEMS, HOW DIFFICULT HAVE THESE PROBLEMS MADE IT FOR YOU TO DO YOUR WORK, TAKE CARE OF THINGS AT HOME, OR GET ALONG WITH OTHER PEOPLE: VERY DIFFICULT
SUM OF ALL RESPONSES TO PHQ QUESTIONS 1-9: 10

## 2024-06-19 NOTE — PROGRESS NOTES
"[facility]       PATIENT'S NAME: Mavis Krishnamurthy  PREFERRED NAME: Mavis  PRONOUNS:       MRN: [id number]  : 1985  ADDRESS: [id number] [place] [id number]  ACCT. NUMBER:  [id number]  DATE OF SERVICE: 24  START TIME: 200  END TIME: 245  PREFERRED PHONE: [phone]  May we leave a program related message: Yes  EMERGENCY CONTACT: was obtained  .  SERVICE MODALITY:  Video Visit:      Provider verified identity through the following two step process.  Patient provided:  Patient  and Patient address    Telemedicine Visit: The patient's condition can be safely assessed and treated via synchronous audio and visual telemedicine encounter.      Reason for Telemedicine Visit: Services only offered telehealth    Originating Site (Patient Location): Patient's home    Distant Site (Provider Location): Provider Remote Setting- Home Office    Consent:  The patient/guardian has verbally consented to: the potential risks and benefits of telemedicine (video visit) versus in person care; bill my insurance or make self-payment for services provided; and responsibility for payment of non-covered services.     Patient would like the video invitation sent by:  My Chart    Mode of Communication:  Video Conference via [employer]    Distant Location (Provider):  Off-site    As the provider I attest to compliance with applicable laws and regulations related to telemedicine.    UNIVERSAL ADULT Mental Health DIAGNOSTIC ASSESSMENT    Identifying Information:  Patient is a 39 year old,   individual.  Patient was referred for an assessment by primary care clinic.  Patient attended the session alone.    Chief Complaint:   The reason for seeking services at this time is: \"Stress/anxiety\".  The problem(s) began 24.    Patient has not attempted to resolve these concerns in the past.    Social/Family History:  Patient reported they grew up in other [place].  They were raised by biological parents  " ".  Parents were always together.  Patient reported that their childhood was \"great\".  Patient described their current relationships with family of origin as \"very close\".     The patient describes their cultural background as .  Cultural influences and impact on patient's life structure, values, norms, and healthcare: Na.  Contextual influences on patient's health include: Contextual Factors: Individual Factors employment sress .    These factors will be addressed in the Preliminary Treatment plan. Patient identified their preferred language to be English. Patient reported they does not need the assistance of an  or other support involved in therapy.     Patient reported had no significant delays in developmental tasks.   Patient's highest education level was college graduate  .  Patient identified the following learning problems: none reported.  Modifications will not be used to assist communication in therapy.  Patient reports they are  able to understand written materials.    Patient reported the following relationship history:  Patient is currently .  Patient's current relationship status is .   Patient identified their sexual orientation as heterosexual.  Patient reported having 3 child(woo). Patient identified partner; parents; siblings; friends as part of their support system.  Patient identified the quality of these relationships as stable and meaningful,  .      Patient's current living/housing situation involves staying in own home/apartment.  The immediate members of family and household include Moo, 39,Spouse and three children and they report that housing is stable.    Patient is currently employed fulltime.  Patient reports their finances are obtained through employment. Patient does not identify finances as a current stressor.      Patient reported that they have not been involved with the legal system.  Patient does not report being under probation/ parole/ " jurisdiction. They are not under any current court jurisdiction. .    Patient's Strengths and Limitations:  Patient identified the following strengths or resources that will help them succeed in treatment: commitment to health and well being. Things that may interfere with the patient's success in treatment include: none identified.     Assessments:  The following assessments were completed by patient for this visit:      PHQ9:       5/20/2013     6:30 PM 12/5/2013    11:30 AM 4/22/2014     6:10 PM 12/22/2014     4:10 PM 6/5/2017     8:21 AM 4/23/2019     9:11 AM 6/19/2024     1:29 PM   PHQ-9 SCORE   PHQ-9 Total Score 2 3 2 0      PHQ-9 Total Score MyChart      1 (Minimal depression) 10 (Moderate depression)   PHQ-9 Total Score     0 1 10   GAD7:       8/21/2013     3:32 PM 12/5/2013    12:23 PM 4/22/2014     7:29 PM 5/30/2024    10:45 AM 6/19/2024     1:34 PM   RUPAL-7 SCORE   Total Score 4 0 2     Total Score     18 (severe anxiety)   Total Score    5 18     CAGE-AID:       6/19/2024     1:36 PM 6/19/2024     2:34 PM   CAGE-AID Total Score   Total Score 4 0   Total Score MyChart 4 (A total score of 2 or greater is considered clinically significant)      PROMIS 10-Global Health (only subscores and total score):       6/19/2024     1:34 PM   PROMIS-10 Scores Only   Global Mental Health Score 7   Global Physical Health Score 15   PROMIS TOTAL - SUBSCORES 22     Miner Suicide Severity Rating Scale (Lifetime/Recent)      6/20/2024    10:14 AM   Miner Suicide Severity Rating (Lifetime/Recent)   Q1 Wish to be Dead (Lifetime) N   Q2 Non-Specific Active Suicidal Thoughts (Lifetime) N   Actual Attempt (Lifetime) N   Has subject engaged in non-suicidal self-injurious behavior? (Lifetime) N   Interrupted Attempts (Lifetime) N   Aborted or Self-Interrupted Attempt (Lifetime) N   Preparatory Acts or Behavior (Lifetime) N   Calculated C-SSRS Risk Score (Lifetime/Recent) No Risk Indicated       Personal and Family Medical  History:  Patient does not report a family history of mental health concerns.  Patient reports family history includes Alzheimer Disease (age of onset: 80) in her maternal grandmother; Heart Disease in her father; Hypertension in her mother; Neurologic Disorder in her paternal grandmother; Neurologic Disorder (age of onset: 16) in her father; Neurologic Disorder (age of onset: 20) in her sister; Rheumatoid Arthritis in her sister..     Patient does not report Mental Health Diagnosis or Treatment.      Patient has had a physical exam to rule out medical causes for current symptoms.  Date of last physical exam was within the past year. Client was encouraged to follow up with PCP if symptoms were to develop. The patient has a [facility] Provider, who is named Leo Ceja..  Patient reports no current medical and/or dental concerns.  Patient denies any issues with pain..   There are not significant appetite / nutritional concerns / weight changes.   Patient does not report a history of head injury / trauma / cognitive impairment.      Patient reports current meds as:   Current Outpatient Medications   Medication Sig Dispense Refill    fluticasone (FLONASE) 50 MCG/ACT nasal spray Spray 1 spray into both nostrils daily      LORazepam (ATIVAN) 0.5 MG tablet 1-2 tabs daily or twice daily as needed. 20 tablet 1    Probiotic Product (PROBIOTIC ADVANCED PO)       sertraline (ZOLOFT) 50 MG tablet 1/2 tab daily x1-2 week then increase to whole tab if not helpful. 30 tablet 2    albuterol (PROAIR HFA/PROVENTIL HFA/VENTOLIN HFA) 108 (90 Base) MCG/ACT inhaler Inhale 2 puffs into the lungs every 6 hours as needed for shortness of breath, wheezing or cough (Patient not taking: Reported on 5/21/2024) 18 g 1     No current facility-administered medications for this visit.       Medication Adherence:  Patient reports taking.  .    Patient Allergies:    Allergies   Allergen Reactions    Bactrim  "[Sulfamethoxazole-Trimethoprim]      Tongue burning, tingling,     Mold     Seasonal Allergies     Trimethoprim      Other reaction(s): \"tongue burning and tingling\"       Medical History:    Past Medical History:   Diagnosis Date    Abnormal Pap smear     resolved, colposcopy    Abnormal Pap smears     Mild dysplasia-2007    AR (allergic rhinitis)     Bicuspid aortic valve          Current Mental Status Exam:   Appearance:  Appropriate    Eye Contact:  Good   Psychomotor:  Normal       Gait / station:  no problem  Attitude / Demeanor: Cooperative  Interested  Speech      Rate / Production: Normal/ Responsive      Volume:  Normal  volume      Language:  intact  Mood:   Normal  Affect:   Appropriate    Thought Content: Clear   Thought Process: Logical       Associations: No loosening of associations  Insight:   Good   Judgment:  Intact   Orientation:  All  Attention/concentration: Good    Substance Use:   Patient did not report a family history of substance use concerns; see medical history section for details.  Patient has not received chemical dependency treatment in the past.  Patient has not ever been to detox.      Patient is not currently receiving any chemical dependency treatment. Patient reported the following problems as a result of their substance use:   None .    Patient denies using alcohol.  Patient denies using tobacco.  Patient denies using cannabis.  Patient denies using caffeine.  Patient reports using/abusing the following substance(s). Patient reported no other substance use.     Substance Use: No symptoms    Based on the negative CAGE score and clinical interview there  are not indications of drug or alcohol abuse.    Significant Losses / Trauma / Abuse / Neglect Issues:   Patient did not  serve in the .  There are indications or report of significant loss, trauma, abuse or neglect issues related to: are no indications and client denies any losses, trauma, abuse, or neglect " concerns.  Concerns for possible neglect are not present.     Safety Assessment:   Patient denies current homicidal ideation and behaviors.  Patient denies current self-injurious ideation and behaviors.    Patient denied risk behaviors associated with substance use.   Patient denies any high risk behaviors associated with mental health symptoms.  Patient reports the following current concerns for their personal safety: None.  Patient reports there are firearms in the house.     yes, they are secured. The firearms are secured in a locked space.    History of Safety Concerns:  Patient denied a history of homicidal ideation.     Patient denied a history of personal safety concerns.    Patient denied a history of assaultive behaviors.    Patient denied a history of sexual assault behaviors.     Patient denied a history of risk behaviors associated with substance use.  Patient denies any history of high risk behaviors associated with mental health symptoms.  Patient reports the following protective factors: forward or future oriented thinking; safe and stable environment; effectively controls impulses; regular physical activity    Risk Plan:  See Recommendations for Safety and Risk Management Plan    Review of Symptoms per patient report:   Depression: Change in sleep, Lack of interest, Change in energy level, Difficulties concentrating, Change in appetite, Ruminations, Irritability, and Feeling sad, down, or depressed  Amber:  No Symptoms  Psychosis: No Symptoms  Anxiety: Excessive worry, Nervousness, Physical complaints, such as headaches, stomachaches, muscle tension, Sleep disturbance, Ruminations, Poor concentration, and Irritability  Panic:  Palpitations, Shortness of breath, Pacing, and Sweating  Post Traumatic Stress Disorder:  No Symptoms   Eating Disorder: No Symptoms  ADD / ADHD:  No symptoms  Conduct Disorder: No symptoms  Autism Spectrum Disorder: No symptoms  Obsessive Compulsive  Disorder: Checking    Patient reports the following compulsive behaviors and treatment history:  Patient denies .      Diagnostic Criteria:   Unspecified Trauma- and Stressor-Related Disorder, Symptoms characteristic of a trauma and stressor related disorder that cause clinically significant distress or impairment in social, occupational, or other important areas of functioning predominate but do not meet the full criteria for any of the disorders in the trauma and stressor related disorders diagnostic class.     Functional Status:  Patient reports the following functional impairments:  management of the household and or completion of tasks, relationship(s), self-care, and work / vocational responsibilities.     Nonprogrammatic care:  Patient is requesting basic services to address current mental health concerns.    Clinical Summary:  1. Psychosocial, Cultural and Contextual Factors: employment stress  .  2. Principal DSM5 Diagnoses  (Sustained by DSM5 Criteria Listed Above):   309.9 (F43.9) Unspecified Trauma and Stressor Related Disorder.  3. Other Diagnoses that is relevant to services:   None at this time.  4. Provisional Diagnosis:  Further diagnosis will be beneficial.  5. Prognosis: Expect Improvement.  6. Likely consequences of symptoms if not treated: higher level of care.  7. Client strengths include:  supportive, wants to learn, willing to ask questions, and willing to relate to others .     Recommendations:     1. Plan for Safety and Risk Management:   Safety and Risk: Recommended that patient call 911 or go to the local ED should there be a change in any of these risk factors..          Report to child / adult protection services was NA.     2. Patient's identified  none identified .     3. Initial Treatment will focus on:    Anxiety -   .     4. Resources/Service Plan:    services are not indicated.   Modifications to assist communication are not indicated.   Additional disability  accommodations are not indicated.      5. Collaboration:   Collaboration / coordination of treatment will be initiated with the following  support professionals: primary care physician.      6.  Referrals:   The following referral(s) will be initiated: Outpatient [facility].       A Release of Information has been obtained for the following:  none at this time .     Clinical Substantiation/medical necessity for the above recommendations:    Patient is a 39 year old who presents with increase in employment stress and anxiety. Patient denies previous mental health concerns and/or providers.    Patients acute suicide risk was determined to be  minimal due to the following factors: Denial of suicidal thoughts, no history of suicide attempts.   Patient denies current thoughts of suicidal ideation and self harm and reports ability to keep self safe.    Patient is not currently under the influence of alcohol or illicit substances, denies experiencing command hallucinations, and has no immediate access to firearms. Protective factors include: Patient reports the following protective factors: dedication to family/friends, safe and stable environment, daily obligations, committment to well-being, sense of personal control or determination and access to a variety of clinical interventions    Patient denies current substance use concerns and reports ability to maintain safety when using substances.    Patient would benefit from more extensive mental health support such as individual therapy weekly to help mitigate risk of hospitalization or suicidality. Patient is in agreement with plan.      7. SHARON:    SHARON:  Discussed the general effects of drugs and alcohol on health and well-being and Discussed the impact of drugs and alcohol when used during pregnancy. Provider gave patient printed information about the  effects of chemical use on their health and well being. Recommendations:  to abstain from all mood altering  substances .     8. Records:   These were reviewed at time of assessment.   Information in this assessment was obtained from the medical record and  provided by patient who is a good historian.    Patient will have open access to their mental health medical record.    9.   Interactive Complexity: No    10. Safety Plan:     Provider Name/ Credentials:  Tosha Gage [facility]  June 19, 2024

## 2024-06-20 PROBLEM — F41.9 ANXIETY: Status: ACTIVE | Noted: 2024-06-20

## 2024-06-20 ASSESSMENT — COLUMBIA-SUICIDE SEVERITY RATING SCALE - C-SSRS
TOTAL  NUMBER OF ABORTED OR SELF INTERRUPTED ATTEMPTS LIFETIME: NO
ATTEMPT LIFETIME: NO
1. HAVE YOU WISHED YOU WERE DEAD OR WISHED YOU COULD GO TO SLEEP AND NOT WAKE UP?: NO
TOTAL  NUMBER OF INTERRUPTED ATTEMPTS LIFETIME: NO
2. HAVE YOU ACTUALLY HAD ANY THOUGHTS OF KILLING YOURSELF?: NO
6. HAVE YOU EVER DONE ANYTHING, STARTED TO DO ANYTHING, OR PREPARED TO DO ANYTHING TO END YOUR LIFE?: NO

## 2024-07-01 ENCOUNTER — VIRTUAL VISIT (OUTPATIENT)
Dept: PSYCHOLOGY | Facility: CLINIC | Age: 39
End: 2024-07-01
Payer: COMMERCIAL

## 2024-07-01 DIAGNOSIS — F43.9 TRAUMA AND STRESSOR-RELATED DISORDER: Primary | ICD-10-CM

## 2024-07-01 PROCEDURE — 90834 PSYTX W PT 45 MINUTES: CPT | Mod: 95 | Performed by: COUNSELOR

## 2024-07-01 NOTE — PROGRESS NOTES
[facility]                                     Progress Note    Patient Name: Mavis Krishnamurthy  Date: 24         Service Type: Individual      Session Start Time: 1500  Session End Time: 1538     Session Length: 38 minutes    Session #: 1    Attendees: Client    Service Modality:  Video Visit:      Provider verified identity through the following two step process.  Patient provided:  Patient  and Patient address    Telemedicine Visit: The patient's condition can be safely assessed and treated via synchronous audio and visual telemedicine encounter.      Reason for Telemedicine Visit: Services only offered telehealth    Originating Site (Patient Location): Patient's home    Distant Site (Provider Location): Provider Remote Setting- Home Office    Consent:  The patient/guardian has verbally consented to: the potential risks and benefits of telemedicine (video visit) versus in person care; bill my insurance or make self-payment for services provided; and responsibility for payment of non-covered services.     Patient would like the video invitation sent by:  My Chart    Mode of Communication:  [facility] Conference via [facility]    Distant Location (Provider):  Off-site    As the provider I attest to compliance with applicable laws and regulations related to telemedicine.    DATA  Interactive Complexity: No  Crisis: No        Progress Since Last Session (Related to Symptoms / Goals / Homework):   Symptoms: No change      Homework: Partially completed      Episode of Care Goals: Minimal progress - PREPARATION (Decided to change - considering how); Intervened by negotiating a change plan and determining options / strategies for behavior change, identifying triggers, exploring social supports, and working towards setting a date to begin behavior change     Current / Ongoing Stressors and Concerns:   Employment stress     Treatment Objective(s) Addressed in This Session:   identify 2 initial signs or  symptoms of anxiety       Intervention:   DBT: [facility]    Assessments completed prior to visit:  The following assessments were completed by patient for this visit:      PHQ9:       5/20/2013     6:30 PM 12/5/2013    11:30 AM 4/22/2014     6:10 PM 12/22/2014     4:10 PM 6/5/2017     8:21 AM 4/23/2019     9:11 AM 6/19/2024     1:29 PM   PHQ-9 SCORE   PHQ-9 Total Score 2 3 2 0      PHQ-9 Total Score MyChart      1 (Minimal depression) 10 (Moderate depression)   PHQ-9 Total Score     0 1 10   GAD7:       8/21/2013     3:32 PM 12/5/2013    12:23 PM 4/22/2014     7:29 PM 5/30/2024    10:45 AM 6/19/2024     1:34 PM   RUPAL-7 SCORE   Total Score 4 0 2     Total Score     18 (severe anxiety)   Total Score    5 18     PROMIS 10-Global Health (all questions and answers displayed):       6/19/2024     1:34 PM 7/1/2024     2:27 PM   PROMIS 10   In general, would you say your health is: Good Very good   In general, would you say your quality of life is: Fair Fair   In general, how would you rate your physical health? Fair Very good   In general, how would you rate your mental health, including your mood and your ability to think? Poor Poor   In general, how would you rate your satisfaction with your social activities and relationships? Fair Fair   In general, please rate how well you carry out your usual social activities and roles Poor Poor   To what extent are you able to carry out your everyday physical activities such as walking, climbing stairs, carrying groceries, or moving a chair? Completely Completely   In the past 7 days, how often have you been bothered by emotional problems such as feeling anxious, depressed, or irritable? Often Often   In the past 7 days, how would you rate your fatigue on average? Moderate Severe   In the past 7 days, how would you rate your pain on average, where 0 means no pain, and 10 means worst imaginable pain? 0 0   In general, would you say your health is: 3 4   In general, would you say  your quality of life is: 2 2   In general, how would you rate your physical health? 2 4   In general, how would you rate your mental health, including your mood and your ability to think? 1 1   In general, how would you rate your satisfaction with your social activities and relationships? 2 2   In general, please rate how well you carry out your usual social activities and roles. (This includes activities at home, at work and in your community, and responsibilities as a parent, child, spouse, employee, friend, etc.) 1 1   To what extent are you able to carry out your everyday physical activities such as walking, climbing stairs, carrying groceries, or moving a chair? 5 5   In the past 7 days, how often have you been bothered by emotional problems such as feeling anxious, depressed, or irritable? 4 4   In the past 7 days, how would you rate your fatigue on average? 3 4   In the past 7 days, how would you rate your pain on average, where 0 means no pain, and 10 means worst imaginable pain? 0 0   Global Mental Health Score 7 7   Global Physical Health Score 15 16   PROMIS TOTAL - SUBSCORES 22 23         ASSESSMENT: Current Emotional / Mental Status (status of significant symptoms):   Risk status (Self / Other harm or suicidal ideation)   Patient denies current fears or concerns for personal safety.   Patient denies current or recent suicidal ideation or behaviors.   Patient denies current or recent homicidal ideation or behaviors.   Patient denies current or recent self injurious behavior or ideation.   Patient denies other safety concerns.   Patient reports there has been no change in risk factors since their last session.     Patient reports there has been no change in protective factors since their last session.     Recommended that patient call 911 or go to the local ED should there be a change in any of these risk factors.     Appearance:   Appropriate    Eye Contact:   Good    Psychomotor Behavior: Normal     Attitude:   Cooperative  Interested   Orientation:   All   Speech    Rate / Production: Normal/ Responsive Normal     Volume:  Normal    Mood:    Normal   Affect:    Appropriate    Thought Content:  Clear    Thought Form:  Coherent  Logical    Insight:    Good      Medication Review:   No changes to current psychiatric medication(s)     Medication Compliance:   Yes     Changes in Health Issues:   None reported     Chemical Use Review:   Substance Use: Chemical use reviewed, no active concerns identified      Tobacco Use: No current tobacco use.      Diagnosis:  1. Trauma and stressor-related disorder        Collateral Reports Completed:   Not Applicable    PLAN: (Patient Tasks / Therapist Tasks / Other)  Patient to practice meditation and mindfulness, taking breaks until next session.        Tosha Gage [facility]

## 2024-07-02 PROBLEM — F43.9 TRAUMA AND STRESSOR-RELATED DISORDER: Status: ACTIVE | Noted: 2024-07-02

## 2024-07-15 ENCOUNTER — VIRTUAL VISIT (OUTPATIENT)
Dept: PSYCHOLOGY | Facility: CLINIC | Age: 39
End: 2024-07-15
Payer: COMMERCIAL

## 2024-07-15 DIAGNOSIS — F43.9 TRAUMA AND STRESSOR-RELATED DISORDER: Primary | ICD-10-CM

## 2024-07-15 PROCEDURE — 90832 PSYTX W PT 30 MINUTES: CPT | Mod: 95 | Performed by: COUNSELOR

## 2024-07-15 NOTE — PROGRESS NOTES
[facility]                                     Progress Note    Patient Name: Mavis Krishnamurthy  Date: 7/15/24         Service Type: Individual      Session Start Time: 1400 Session End Time: 1432     Session Length: 32 minutes    Session #: 2    Attendees: Client    Service Modality:  Video Visit:      Provider verified identity through the following two step process.  Patient provided:  Patient  and Patient address    Telemedicine Visit: The patient's condition can be safely assessed and treated via synchronous audio and visual telemedicine encounter.      Reason for Telemedicine Visit: Services only offered telehealth    Originating Site (Patient Location): Patient's home    Distant Site (Provider Location): Provider Remote Setting- Home Office    Consent:  The patient/guardian has verbally consented to: the potential risks and benefits of telemedicine (video visit) versus in person care; bill my insurance or make self-payment for services provided; and responsibility for payment of non-covered services.     Patient would like the video invitation sent by:  My Chart    Mode of Communication:  [facility]    Distant Location (Provider):  Off-site    As the provider I attest to compliance with applicable laws and regulations related to telemedicine.    DATA  Interactive Complexity: No  Crisis: No        Progress Since Last Session (Related to Symptoms / Goals / Homework):   Symptoms: No change      Homework: Partially completed      Episode of Care Goals: Minimal progress - PREPARATION (Decided to change - considering how); Intervened by negotiating a change plan and determining options / strategies for behavior change, identifying triggers, exploring social supports, and working towards setting a date to begin behavior change     Current / Ongoing Stressors and Concerns:   Accepted new position     Treatment Objective(s) Addressed in This Session:   identify 2 initial signs or  symptoms of anxiety       Intervention:   DBT: [facility]    Assessments completed prior to visit:  The following assessments were completed by patient for this visit:      PHQ9:       5/20/2013     6:30 PM 12/5/2013    11:30 AM 4/22/2014     6:10 PM 12/22/2014     4:10 PM 6/5/2017     8:21 AM 4/23/2019     9:11 AM 6/19/2024     1:29 PM   PHQ-9 SCORE   PHQ-9 Total Score 2 3 2 0      PHQ-9 Total Score MyChart      1 (Minimal depression) 10 (Moderate depression)   PHQ-9 Total Score     0 1 10   GAD7:       8/21/2013     3:32 PM 12/5/2013    12:23 PM 4/22/2014     7:29 PM 5/30/2024    10:45 AM 6/19/2024     1:34 PM   RUPAL-7 SCORE   Total Score 4 0 2     Total Score     18 (severe anxiety)   Total Score    5 18     PROMIS 10-Global Health (all questions and answers displayed):       6/19/2024     1:34 PM 7/1/2024     2:27 PM   PROMIS 10   In general, would you say your health is: Good Very good   In general, would you say your quality of life is: Fair Fair   In general, how would you rate your physical health? Fair Very good   In general, how would you rate your mental health, including your mood and your ability to think? Poor Poor   In general, how would you rate your satisfaction with your social activities and relationships? Fair Fair   In general, please rate how well you carry out your usual social activities and roles Poor Poor   To what extent are you able to carry out your everyday physical activities such as walking, climbing stairs, carrying groceries, or moving a chair? Completely Completely   In the past 7 days, how often have you been bothered by emotional problems such as feeling anxious, depressed, or irritable? Often Often   In the past 7 days, how would you rate your fatigue on average? Moderate Severe   In the past 7 days, how would you rate your pain on average, where 0 means no pain, and 10 means worst imaginable pain? 0 0   In general, would you say your health is: 3 4   In general, would you say  your quality of life is: 2 2   In general, how would you rate your physical health? 2 4   In general, how would you rate your mental health, including your mood and your ability to think? 1 1   In general, how would you rate your satisfaction with your social activities and relationships? 2 2   In general, please rate how well you carry out your usual social activities and roles. (This includes activities at home, at work and in your community, and responsibilities as a parent, child, spouse, employee, friend, etc.) 1 1   To what extent are you able to carry out your everyday physical activities such as walking, climbing stairs, carrying groceries, or moving a chair? 5 5   In the past 7 days, how often have you been bothered by emotional problems such as feeling anxious, depressed, or irritable? 4 4   In the past 7 days, how would you rate your fatigue on average? 3 4   In the past 7 days, how would you rate your pain on average, where 0 means no pain, and 10 means worst imaginable pain? 0 0   Global Mental Health Score 7 7   Global Physical Health Score 15 16   PROMIS TOTAL - SUBSCORES 22 23         ASSESSMENT: Current Emotional / Mental Status (status of significant symptoms):   Risk status (Self / Other harm or suicidal ideation)   Patient denies current fears or concerns for personal safety.   Patient denies current or recent suicidal ideation or behaviors.   Patient denies current or recent homicidal ideation or behaviors.   Patient denies current or recent self injurious behavior or ideation.   Patient denies other safety concerns.   Patient reports there has been no change in risk factors since their last session.     Patient reports there has been no change in protective factors since their last session.     Recommended that patient call 911 or go to the local ED should there be a change in any of these risk factors.     Appearance:   Appropriate    Eye Contact:   Good    Psychomotor Behavior: Normal     Attitude:   Cooperative  Interested   Orientation:   All   Speech    Rate / Production: Normal/ Responsive Normal     Volume:  Normal    Mood:    Normal   Affect:    Appropriate    Thought Content:  Clear    Thought Form:  Coherent  Logical    Insight:    Good      Medication Review:   No changes to current psychiatric medication(s)     Medication Compliance:   Yes     Changes in Health Issues:   None reported     Chemical Use Review:   Substance Use: Chemical use reviewed, no active concerns identified      Tobacco Use: No current tobacco use.      Diagnosis:  1. Trauma and stressor-related disorder        Collateral Reports Completed:   Not Applicable    PLAN: (Patient Tasks / Therapist Tasks / Other)  Patient to practice PMR until next session.        Tosha Gage [facility]

## 2024-07-23 ENCOUNTER — MYC REFILL (OUTPATIENT)
Dept: FAMILY MEDICINE | Facility: CLINIC | Age: 39
End: 2024-07-23
Payer: COMMERCIAL

## 2024-07-23 DIAGNOSIS — J32.9 SINUSITIS, UNSPECIFIED CHRONICITY, UNSPECIFIED LOCATION: ICD-10-CM

## 2024-07-23 DIAGNOSIS — R05.9 COUGH, UNSPECIFIED TYPE: ICD-10-CM

## 2024-07-23 RX ORDER — ALBUTEROL SULFATE 90 UG/1
2 AEROSOL, METERED RESPIRATORY (INHALATION) EVERY 6 HOURS PRN
Qty: 18 G | Refills: 1 | Status: SHIPPED | OUTPATIENT
Start: 2024-07-23

## 2024-07-29 ENCOUNTER — OFFICE VISIT (OUTPATIENT)
Dept: FAMILY MEDICINE | Facility: CLINIC | Age: 39
End: 2024-07-29
Payer: COMMERCIAL

## 2024-07-29 VITALS
RESPIRATION RATE: 14 BRPM | SYSTOLIC BLOOD PRESSURE: 93 MMHG | TEMPERATURE: 98.1 F | OXYGEN SATURATION: 99 % | DIASTOLIC BLOOD PRESSURE: 59 MMHG | HEART RATE: 57 BPM | HEIGHT: 65 IN | BODY MASS INDEX: 20.49 KG/M2 | WEIGHT: 123 LBS

## 2024-07-29 DIAGNOSIS — G57.01 PIRIFORMIS SYNDROME, RIGHT: Primary | ICD-10-CM

## 2024-07-29 PROCEDURE — 99213 OFFICE O/P EST LOW 20 MIN: CPT | Performed by: PHYSICIAN ASSISTANT

## 2024-07-29 RX ORDER — METHYLPREDNISOLONE 4 MG
TABLET, DOSE PACK ORAL
Qty: 21 TABLET | Refills: 0 | Status: SHIPPED | OUTPATIENT
Start: 2024-07-29

## 2024-07-29 ASSESSMENT — ENCOUNTER SYMPTOMS
BACK PAIN: 1
LEG PAIN: 1

## 2024-07-29 ASSESSMENT — PAIN SCALES - GENERAL: PAINLEVEL: MODERATE PAIN (5)

## 2024-07-29 NOTE — PROGRESS NOTES
Assessment & Plan     Piriformis syndrome, right  Likely secondary to trampoline   - methylPREDNISolone (MEDROL DOSEPAK) 4 MG tablet therapy pack; Follow Package Directions  Discussed stretches, to continue doing them at home, will attach some via Inktdhart as well although already highlighted some that help.  Prednisone burst to help with acute pain, suggest continued stretches.  PT if not clearing          Follow up if not improving       Subjective   Mavis is a 39 year old, presenting for the following health issues:  Back Pain and Leg Pain          7/29/2024     6:47 AM   Additional Questions   Roomed by Diane Contreras MA   Accompanied by Self     History of Present Illness       Back Pain:  She presents for follow up of back pain. Patient's back pain is a new problem.    Original cause of back pain: other  First noticed back pain: 1-4 weeks ago  Patient feels back pain: comes and goesLocation of back pain:  Right buttock  Description of back pain: burning, dull ache and shooting  Back pain spreads: right foot    Since patient first noticed back pain, pain is: gradually worsening  Does back pain interfere with her job:  No  On a scale of 1-10 (10 being the worst), patient describes pain as:  5  What makes back pain worse: lying down and sitting   Acupuncture: not tried  Acetaminophen: not helpful  Activity or exercise: not helpful  Chiropractor:  Not tried  Cold: not helpful  Heat: helpful  Massage: not tried  Muscle relaxants: not tried  NSAIDS: not tried  Opioids: not tried  Physical Therapy: not tried  Rest: not helpful  Steroid Injection: not tried  Stretching: helpful  Surgery: not tried  TENS unit: not tried  Topical pain relievers: not helpful  Other healthcare providers patient is seeing for back pain: None    She eats 2-3 servings of fruits and vegetables daily.She consumes 0 sweetened beverage(s) daily.She exercises with enough effort to increase her heart rate 30 to 60 minutes per day.  She exercises  "with enough effort to increase her heart rate 5 days per week.   She is taking medications regularly.         Review of Systems  Constitutional, neuro, ENT, endocrine, pulmonary, cardiac, gastrointestinal, genitourinary, musculoskeletal, integument and psychiatric systems are negative, except as otherwise noted.        Objective    BP 93/59   Pulse 57   Temp 98.1  F (36.7  C) (Tympanic)   Resp 14   Ht 1.651 m (5' 5\")   Wt 55.8 kg (123 lb)   LMP 01/09/2020 (Approximate)   SpO2 99%   Breastfeeding No   BMI 20.47 kg/m    Body mass index is 20.47 kg/m .      Physical Exam   GENERAL: alert and no distress  MS: no gross musculoskeletal defects noted, no edema. Pain R hip with direct palpation, pain R buttocks with ext rotation + elevation.  Loose joints        Signed Electronically by: Wero Joyce PA-C      "

## 2024-10-11 ENCOUNTER — TRANSFERRED RECORDS (OUTPATIENT)
Dept: HEALTH INFORMATION MANAGEMENT | Facility: CLINIC | Age: 39
End: 2024-10-11
Payer: COMMERCIAL

## 2024-10-16 ENCOUNTER — VIRTUAL VISIT (OUTPATIENT)
Dept: FAMILY MEDICINE | Facility: CLINIC | Age: 39
End: 2024-10-16
Payer: COMMERCIAL

## 2024-10-16 ENCOUNTER — MYC MEDICAL ADVICE (OUTPATIENT)
Dept: FAMILY MEDICINE | Facility: CLINIC | Age: 39
End: 2024-10-16
Payer: COMMERCIAL

## 2024-10-16 ENCOUNTER — NURSE TRIAGE (OUTPATIENT)
Dept: FAMILY MEDICINE | Facility: CLINIC | Age: 39
End: 2024-10-16

## 2024-10-16 DIAGNOSIS — U07.1 CLINICAL DIAGNOSIS OF COVID-19: Primary | ICD-10-CM

## 2024-10-16 PROCEDURE — 99213 OFFICE O/P EST LOW 20 MIN: CPT | Mod: 95 | Performed by: NURSE PRACTITIONER
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
## 2024-10-16 NOTE — TELEPHONE ENCOUNTER
GFR Estimate   Date Value Ref Range Status   06/09/2023 >90 >60 mL/min/1.73m2 Final     Comment:     eGFR calculated using 2021 CKD-EPI equation.   07/29/2020 >90 >60 mL/min/[1.73_m2] Final     Comment:     Non  GFR Calc  Starting 12/18/2018, serum creatinine based estimated GFR (eGFR) will be   calculated using the Chronic Kidney Disease Epidemiology Collaboration   (CKD-EPI) equation.       ALT   Date Value Ref Range Status   06/09/2023 12 10 - 35 U/L Final   07/29/2020 20 0 - 50 U/L Final     AST   Date Value Ref Range Status   06/09/2023 21 10 - 35 U/L Final   07/29/2020 9 0 - 45 U/L Final     Alkaline Phosphatase   Date Value Ref Range Status   06/09/2023 57 35 - 104 U/L Final   07/29/2020 58 40 - 150 U/L Final     Bilirubin Total   Date Value Ref Range Status   06/09/2023 0.6 <=1.2 mg/dL Final   07/29/2020 0.3 0.2 - 1.3 mg/dL Final     Current symptoms: headache, body aches, chills/fever  Symptoms started: 10/15/2024 - Positive test today 10/16/2024  RN COVID TREATMENT VIS        The patient has been triaged and does not require a higher level of care.    Mavis Krishnamurthy  39 year old  Current weight? 125 lbs    Has the patient been seen by a primary care provider at an [facility] or [facility] within the past two years? Yes.   Have you been in close proximity to/do you have a known exposure to a person with a confirmed case of influenza? No.     General treatment eligibility:  Date of positive COVID test (PCR or at home)?  10/16/2024    Are you or have you been hospitalized for this COVID-19 infection? No.   Have you received monoclonal antibodies or antiviral treatment for COVID-19 since this positive test? No.   Do you have any of the following conditions that place you at risk of being very sick from COVID-19?   - Heart conditions such as cardiomyopathies, congenital heart defects, coronary artery disease, heart arrhythmias, heart failure, hypertension, valve  disorders   - Mental health disorders including mood disorders, depression, schizophrenia spectrum disorders   Yes, patient has at least one high risk condition as noted above.     Current COVID symptoms:   - fever or chills  - cough  - fatigue  - muscle or body aches  - headache  - congestion or runny nose  Yes. Patient has at least one symptom as selected.     How many days since symptoms started? 5 days or less. Established patient, 12 years or older weighing at least 88.2 lbs, who has symptoms that started in the past 5 days, has not been hospitalized nor received treatment already, and is at risk for being very sick from COVID-19.     Treatment eligibility by RN:  Are you currently pregnant or nursing? No  Do you have a clinically significant hypersensitivity to nirmatrelvir or ritonavir, or toxic epidermal necrolysis (TEN) or Michelle-Eulogio Syndrome? No  Do you have a history of hepatitis, any hepatic impairment on the Problem List (such as Child-Urbano Class C, cirrhosis, fatty liver disease, alcoholic liver disease), or was the last liver lab (hepatic panel, ALT, AST, ALK Phos, bilirubin) elevated in the past 6 months? YES  Do you have any history of severe renal impairment (eGFR < 30mL/min)? No    Is patient eligible to continue? No, patient does not meet all eligibility requirements for the RN COVID treatment (as denoted by yes response(s) above). Patient informed they will need a virtual provider visit to assess treatment options.  Patient will be transferred to a  at the end of this call.   Patient reports the below item about her liver.  She will need an appointment with a provider.  She was assisted in making this appointment. Patient was given signs and symptoms to go to the E.R. or call 911.  Patient verbalizes good understanding, agrees with plan and states she needs no further support. Molly Dorantes RN    Dear Mavis,     Here are your recent results.     Renal  ultrasound results were normal.  No anatomical issues found.  Possible fatty liver- you should touch base with your normal doctor on that.  Probably nothing to do on that except watch your diet and observation.     Please let us know if you have any questions or concerns.     Antoine GUADARRAMA   Written by Obie Gallo MD on 4/24/2019  4:27 PM CDT  Seen by patient Mavis Krishnamurthy on 10/16/2024 11:23 AM        Additional Information   Negative: SEVERE difficulty breathing (e.g., struggling for each breath, speaks in single words)   Negative: Difficult to awaken or acting confused (e.g., disoriented, slurred speech)   Negative: Bluish (or gray) lips or face now   Negative: Shock suspected (e.g., cold/pale/clammy skin, too weak to stand, low BP, rapid pulse)   Negative: Sounds like a life-threatening emergency to the triager   Negative: SEVERE or constant chest pain or pressure  (Exception: Mild central chest pain, present only when coughing.)   Negative: MODERATE difficulty breathing (e.g., speaks in phrases, SOB even at rest, pulse 100-120)   Negative: Headache and stiff neck (can't touch chin to chest)   Negative: Oxygen level (e.g., pulse oximetry) 90% or lower   Negative: Chest pain or pressure  (Exception: MILD central chest pain, present only when coughing.)   Negative: Drinking very little and dehydration suspected (e.g., no urine > 12 hours, very dry mouth, very lightheaded)   Negative: Patient sounds very sick or weak to the triager   Negative: MILD difficulty breathing (e.g., minimal/no SOB at rest, SOB with walking, pulse <100)    Protocols used: Coronavirus (COVID-19) Diagnosed or [id number]-U-WN

## 2024-10-16 NOTE — TELEPHONE ENCOUNTER
GFR Estimate   Date Value Ref Range Status   06/09/2023 >90 >60 mL/min/1.73m2 Final     Comment:     eGFR calculated using 2021 CKD-EPI equation.   07/29/2020 >90 >60 mL/min/[1.73_m2] Final     Comment:     Non  GFR Calc  Starting 12/18/2018, serum creatinine based estimated GFR (eGFR) will be   calculated using the Chronic Kidney Disease Epidemiology Collaboration   (CKD-EPI) equation.       ALT   Date Value Ref Range Status   06/09/2023 12 10 - 35 U/L Final   07/29/2020 20 0 - 50 U/L Final     AST   Date Value Ref Range Status   06/09/2023 21 10 - 35 U/L Final   07/29/2020 9 0 - 45 U/L Final     Alkaline Phosphatase   Date Value Ref Range Status   06/09/2023 57 35 - 104 U/L Final   07/29/2020 58 40 - 150 U/L Final     Bilirubin Total   Date Value Ref Range Status   06/09/2023 0.6 <=1.2 mg/dL Final   07/29/2020 0.3 0.2 - 1.3 mg/dL Final     Please see telephone encounter dated 10/16/2024 for further documentation of this MyCClever[facility]izet message.  Thank you. Molly Dorantes R.N.

## 2024-10-25 ENCOUNTER — TRANSFERRED RECORDS (OUTPATIENT)
Dept: HEALTH INFORMATION MANAGEMENT | Facility: CLINIC | Age: 39
End: 2024-10-25
Payer: COMMERCIAL

## 2024-11-12 ENCOUNTER — VIRTUAL VISIT (OUTPATIENT)
Dept: URGENT CARE | Facility: CLINIC | Age: 39
End: 2024-11-12
Payer: COMMERCIAL

## 2024-11-12 DIAGNOSIS — J01.90 ACUTE SINUSITIS, RECURRENCE NOT SPECIFIED, UNSPECIFIED LOCATION: Primary | ICD-10-CM

## 2024-11-12 PROCEDURE — 99214 OFFICE O/P EST MOD 30 MIN: CPT | Mod: 95

## 2024-11-12 NOTE — PROGRESS NOTES
Assessment & Plan     Acute sinusitis, recurrence not specified, unspecified location  Will do anbx  - amoxicillin-clavulanate (AUGMENTIN) 875-125 MG tablet  Dispense: 20 tablet; Refill: 0             No follow-ups on file.    Virtual Urgent Care  [facility] VIRTUAL URGENT CARE    Arcelia Gamble is a 39 year old female who presents to virtual urgent care clinic today for the following health issues:  No chief complaint on file.      HPI    Concern about sinus infection  COVID 1 month ago.  Initially felt better but congestion has lingering.  Using beckie pot for couple weeks  Flonase  Mucinex 1 week ago.  Yellow and pressure  Pressure on left side behind eye and face    Virtual visit on phone 5 minutes  Me remote/MOA  Patient home        Review of Systems        Objective    LMP 01/09/2020 (Approximate)   Physical Exam  Constitutional:       Comments: Pleasant  On phone  congested                    
Breath sounds clear and equal bilaterally.
n/a

## 2025-01-06 ENCOUNTER — FCC EXTENDED DOCUMENTATION (OUTPATIENT)
Dept: PSYCHOLOGY | Facility: CLINIC | Age: 40
End: 2025-01-06
Payer: COMMERCIAL

## 2025-01-06 DIAGNOSIS — F43.9 TRAUMA AND STRESSOR-RELATED DISORDER: Primary | ICD-10-CM

## 2025-01-06 PROCEDURE — 99207 PR NO CHARGE LOS: CPT | Performed by: COUNSELOR

## 2025-02-15 ENCOUNTER — OFFICE VISIT (OUTPATIENT)
Dept: URGENT CARE | Facility: URGENT CARE | Age: 40
End: 2025-02-15
Payer: COMMERCIAL

## 2025-02-15 VITALS
WEIGHT: 128 LBS | SYSTOLIC BLOOD PRESSURE: 100 MMHG | TEMPERATURE: 98.3 F | BODY MASS INDEX: 21.3 KG/M2 | OXYGEN SATURATION: 100 % | RESPIRATION RATE: 16 BRPM | HEART RATE: 63 BPM | DIASTOLIC BLOOD PRESSURE: 57 MMHG

## 2025-02-15 DIAGNOSIS — M79.5 FOREIGN BODY (FB) IN SOFT TISSUE: Primary | ICD-10-CM

## 2025-02-15 PROCEDURE — 99207 PR NO CHARGE LOS: CPT | Performed by: FAMILY MEDICINE

## 2025-02-15 RX ORDER — PREDNISOLONE ACETATE 10 MG/ML
SUSPENSION/ DROPS OPHTHALMIC
COMMUNITY
Start: 2024-07-23

## 2025-02-15 NOTE — PROGRESS NOTES
40-year-old female stepped on a piece of lead pencil this morning, involving right foot, tried to remove herself without any success.  Needs area exploration under appropriate anesthesia.  Recommended to go to ER for further evaluation.  All questions answered.    Dr Contreras

## 2025-04-21 ENCOUNTER — PATIENT OUTREACH (OUTPATIENT)
Dept: CARE COORDINATION | Facility: CLINIC | Age: 40
End: 2025-04-21
Payer: COMMERCIAL

## 2025-05-05 ENCOUNTER — PATIENT OUTREACH (OUTPATIENT)
Dept: CARE COORDINATION | Facility: CLINIC | Age: 40
End: 2025-05-05
Payer: COMMERCIAL

## 2025-06-22 ENCOUNTER — HEALTH MAINTENANCE LETTER (OUTPATIENT)
Age: 40
End: 2025-06-22

## 2025-07-01 ENCOUNTER — OFFICE VISIT (OUTPATIENT)
Dept: URGENT CARE | Facility: URGENT CARE | Age: 40
End: 2025-07-01
Payer: COMMERCIAL

## 2025-07-01 VITALS
RESPIRATION RATE: 16 BRPM | TEMPERATURE: 97.5 F | HEART RATE: 60 BPM | OXYGEN SATURATION: 99 % | DIASTOLIC BLOOD PRESSURE: 71 MMHG | SYSTOLIC BLOOD PRESSURE: 124 MMHG | HEIGHT: 64 IN | BODY MASS INDEX: 21.72 KG/M2 | WEIGHT: 127.2 LBS

## 2025-07-01 DIAGNOSIS — H60.391 INFECTIVE OTITIS EXTERNA, RIGHT: Primary | ICD-10-CM

## 2025-07-01 PROCEDURE — 3078F DIAST BP <80 MM HG: CPT

## 2025-07-01 PROCEDURE — 99213 OFFICE O/P EST LOW 20 MIN: CPT

## 2025-07-01 PROCEDURE — 1125F AMNT PAIN NOTED PAIN PRSNT: CPT

## 2025-07-01 PROCEDURE — 3074F SYST BP LT 130 MM HG: CPT

## 2025-07-01 RX ORDER — NEOMYCIN SULFATE, POLYMYXIN B SULFATE AND HYDROCORTISONE 10; 3.5; 1 MG/ML; MG/ML; [USP'U]/ML
3 SUSPENSION/ DROPS AURICULAR (OTIC) 4 TIMES DAILY
Qty: 10 ML | Refills: 0 | Status: SHIPPED | OUTPATIENT
Start: 2025-07-01 | End: 2025-07-08

## 2025-07-01 ASSESSMENT — PAIN SCALES - GENERAL: PAINLEVEL_OUTOF10: MODERATE PAIN (4)

## 2025-07-01 NOTE — PROGRESS NOTES
Urgent Care Clinic Visit    Chief Complaint   Patient presents with    Urgent Care    Ear Problem     Right ear pressure with headaches x's 1 week                7/1/2025     6:27 PM   Additional Questions   Roomed by ca   Accompanied by self

## 2025-07-01 NOTE — PATIENT INSTRUCTIONS
Use the ear drops as prescribed.  Can use Tylenol and/or ibuprofen as needed for pain.  Maximum dose of Tylenol is 4000mg in a 24 hour period of time.  Take ibuprofen with food to avoid stomach upset.

## 2025-07-01 NOTE — PROGRESS NOTES
"ASSESSMENT:  (H60.391) Infective otitis externa, right  (primary encounter diagnosis)  Plan: neomycin-polymyxin-hydrocortisone (CORTISPORIN)        [id number] otic suspension    PLAN:  Patient without any fever, erythema or edema over the mastoid bone.  Tenderness in the right external ear with radiation into the posterior region of the head behind the right ear with symptom onset after a lot of swimming in a lake; likely infective otitis externa.  We discussed using the eardrops as prescribed and taking Tylenol and/or ibuprofen as needed for pain with the maximum dose of Tylenol being 4000 mg in a 24-hour period of time and to take ibuprofen with food to avoid upset stomach.  We also discussed returning to clinic with any new or worsening symptoms.  Patient acknowledged understanding of the above plan.    The use of Dragon/[employer]angate BVation services may have been used to construct the content in this note; any grammatical or spelling errors are non-intentional. Please contact the author of this note directly if you are in need of any clarification.      AFSHAN Gallagher CNP    SUBJECTIVE:  Mavis Krishnamurthy is a 40 year old female who presents with right sided external ear discomfort and pain behind the right ear for approximately 1 week.  Patient reports swimming in a lake \"a lot\" prior to symptom onset  Treatment: Tylenol, ibuprofen and Voltaren gel    ROS:  Negative except noted above.      OBJECTIVE:  /71   Pulse 60   Temp 97.5  F (36.4  C) (Tympanic)   Resp 16   Ht 1.626 m (5' 4\")   Wt 57.7 kg (127 lb 3.2 oz)   LMP 01/09/2020 (Approximate)   SpO2 99%   BMI 21.83 kg/m     GENERAL: no acute distress  EYES: EOMI,  PERRL, conjunctiva clear  EARS:  The right TM is normal: no effusions, no erythema, and normal landmarks     The right auditory canal is normal and without drainage, edema or erythema  Patient reports tenderness in the external right ear upon exam  The left TM is normal: no " effusions, no erythema, and normal landmarks  The left auditory canal is normal and without drainage, edema or erythema  Oropharynx exam is normal: no lesions, erythema, adenopathy or exudate.  NECK: supple, non-tender to palpation, no adenopathy noted  SKIN: no suspicious lesions or rashes

## 2025-07-07 ENCOUNTER — OFFICE VISIT (OUTPATIENT)
Dept: FAMILY MEDICINE | Facility: CLINIC | Age: 40
End: 2025-07-07
Payer: COMMERCIAL

## 2025-07-07 VITALS
SYSTOLIC BLOOD PRESSURE: 100 MMHG | HEIGHT: 65 IN | HEART RATE: 65 BPM | DIASTOLIC BLOOD PRESSURE: 66 MMHG | RESPIRATION RATE: 16 BRPM | BODY MASS INDEX: 20.96 KG/M2 | WEIGHT: 125.8 LBS | OXYGEN SATURATION: 100 % | TEMPERATURE: 98 F

## 2025-07-07 DIAGNOSIS — H92.01 OTALGIA, RIGHT: Primary | ICD-10-CM

## 2025-07-07 DIAGNOSIS — M62.838 TRAPEZIUS MUSCLE SPASM: ICD-10-CM

## 2025-07-07 PROCEDURE — 3078F DIAST BP <80 MM HG: CPT | Performed by: PHYSICIAN ASSISTANT

## 2025-07-07 PROCEDURE — 1125F AMNT PAIN NOTED PAIN PRSNT: CPT | Performed by: PHYSICIAN ASSISTANT

## 2025-07-07 PROCEDURE — 3074F SYST BP LT 130 MM HG: CPT | Performed by: PHYSICIAN ASSISTANT

## 2025-07-07 PROCEDURE — 99214 OFFICE O/P EST MOD 30 MIN: CPT | Performed by: PHYSICIAN ASSISTANT

## 2025-07-07 RX ORDER — TIZANIDINE 2 MG/1
2 TABLET ORAL AT BEDTIME
Qty: 30 TABLET | Refills: 0 | Status: SHIPPED | OUTPATIENT
Start: 2025-07-07

## 2025-07-07 ASSESSMENT — PAIN SCALES - GENERAL: PAINLEVEL_OUTOF10: MODERATE PAIN (5)

## 2025-07-07 NOTE — PROGRESS NOTES
Assessment & Plan     (H92.01) Otalgia, right  (primary encounter diagnosis)  Comment: Right-sided otalgia.  Discussed with patient broad differential diagnosis including mastoiditis, TMJ, eustachian tube dysfunction, tension headache, trapezius spasm, amongst others.  Patient has been having some dental pain as well.  Discussed with patient CT scan of head as well as coverage with Augmentin as she does have reproducible tenderness over the mastoid.  There is no erythema or bulging over this distribution.  Referral for ENT placed.  If any worsening new concerns to be seen again more urgently.  Plan: amoxicillin-clavulanate (AUGMENTIN) 875-125 MG         tablet, CT Head w/o & w Contrast, Adult ENT          Referral          (M62.530) Trapezius muscle spasm  Comment: Reproducible spasm musculature of the trapezius right greater than left.  Patient is right-hand dominant.  Discussed tizanidine.  No focal neurologic deficits on examination.  Cranial nerves II through XII grossly intact.  Discussed tizanidine.  No driving or operating heavy machinery under the influence of medication.  Plan: tiZANidine (ZANAFLEX) 2 MG tablet         Arcelia Gamble is a 40 year old, presenting for the following health issues:  Otalgia (Ear pain for about a week plus. )      7/7/2025     9:46 AM   Additional Questions   Roomed by MYRNA FREED   Accompanied by SELF     History of Present Illness       Reason for visit:  Ear pain    She eats 4 or more servings of fruits and vegetables daily.She consumes 0 sweetened beverage(s) daily.She exercises with enough effort to increase her heart rate 20 to 29 minutes per day.  She exercises with enough effort to increase her heart rate 5 days per week.   She is taking medications regularly.      For roughly 10 days patient has been having right ear pain.  He was having pain behind the right ear as well as in the ear after swimming [place].  Was prescribed eardrops for suspected  "otitis externa.  No improvement with discomfort.  She denies any trauma or injury.  Has been having more pressure/discomfort that she indicates behind the ear over the mastoid process.  Has been having intermittent dental like pain that is sporadic.  She has not had any localized reproducible dental discomfort.  No nasal congestion or sore throat.  No fevers.  Does have discomfort at the base of her skull and over the trapezius musculature.        Review of Systems  Constitutional, HEENT, cardiovascular, pulmonary, gi and gu systems are negative, except as otherwise noted.      Objective    /66   Pulse 65   Temp 98  F (36.7  C) (Oral)   Resp 16   Ht 1.64 m (5' 4.57\")   Wt 57.1 kg (125 lb 12.8 oz)   LMP 01/09/2020 (Approximate)   SpO2 100%   BMI 21.22 kg/m    Body mass index is 21.22 kg/m .  Physical Exam   GENERAL: alert and no distress  EYES: Eyes grossly normal to inspection, PERRL and conjunctivae and sclerae normal  HENT: normal cephalic/atraumatic, right ear: normal: no effusions, no erythema, normal landmarks and tenderness over the past week.  No overlying erythema or warmth., left ear: normal: no effusions, no erythema, normal landmarks, nose and mouth without ulcers or lesions, oropharynx clear, and oral mucous membranes moist  NECK: no adenopathy, no asymmetry, masses, or scars  RESP: lungs clear to auscultation - no rales, rhonchi or wheezes  CV: regular rates and rhythm, no murmur, click or rub, and no peripheral edema  MS: Tenderness over the base of the occiput bilaterally.  More pronounced right compared to left.  Reproducible tenderness and spasm over the trapezius musculature.  NEURO: Normal strength and tone, sensory exam grossly normal, mentation intact, and cranial nerves 2-12 intact            Signed Electronically by: Cade Becerra PA-C    "

## 2025-07-08 ENCOUNTER — PATIENT OUTREACH (OUTPATIENT)
Dept: CARE COORDINATION | Facility: CLINIC | Age: 40
End: 2025-07-08

## 2025-07-08 ENCOUNTER — ANCILLARY PROCEDURE (OUTPATIENT)
Dept: CT IMAGING | Facility: CLINIC | Age: 40
End: 2025-07-08
Attending: PHYSICIAN ASSISTANT
Payer: COMMERCIAL

## 2025-07-08 DIAGNOSIS — H92.01 OTALGIA, RIGHT: ICD-10-CM

## 2025-07-08 PROCEDURE — 70470 CT HEAD/BRAIN W/O & W/DYE: CPT | Mod: GC | Performed by: RADIOLOGY

## 2025-07-08 RX ORDER — IOPAMIDOL 755 MG/ML
100 INJECTION, SOLUTION INTRAVASCULAR ONCE
Status: COMPLETED | OUTPATIENT
Start: 2025-07-08 | End: 2025-07-08

## 2025-07-08 RX ADMIN — IOPAMIDOL 70 ML: 755 INJECTION, SOLUTION INTRAVASCULAR at 08:04

## 2025-07-10 ENCOUNTER — PATIENT OUTREACH (OUTPATIENT)
Dept: CARE COORDINATION | Facility: CLINIC | Age: 40
End: 2025-07-10

## 2025-07-28 ENCOUNTER — OFFICE VISIT (OUTPATIENT)
Dept: URGENT CARE | Facility: URGENT CARE | Age: 40
End: 2025-07-28
Payer: COMMERCIAL

## 2025-07-28 VITALS
HEART RATE: 63 BPM | WEIGHT: 130.8 LBS | SYSTOLIC BLOOD PRESSURE: 116 MMHG | TEMPERATURE: 98.4 F | OXYGEN SATURATION: 99 % | BODY MASS INDEX: 22.06 KG/M2 | DIASTOLIC BLOOD PRESSURE: 65 MMHG

## 2025-07-28 DIAGNOSIS — R31.29 MICROSCOPIC HEMATURIA: ICD-10-CM

## 2025-07-28 DIAGNOSIS — R35.0 URINARY FREQUENCY: Primary | ICD-10-CM

## 2025-07-28 LAB
ALBUMIN UR-MCNC: NEGATIVE MG/DL
APPEARANCE UR: CLEAR
BILIRUB UR QL STRIP: NEGATIVE
CLUE CELLS: ABNORMAL
COLOR UR AUTO: YELLOW
GLUCOSE UR STRIP-MCNC: NEGATIVE MG/DL
HGB UR QL STRIP: ABNORMAL
KETONES UR STRIP-MCNC: NEGATIVE MG/DL
LEUKOCYTE ESTERASE UR QL STRIP: NEGATIVE
MUCOUS THREADS #/AREA URNS LPF: PRESENT /LPF
NITRATE UR QL: NEGATIVE
PH UR STRIP: 5.5 [PH] (ref 5–7)
RBC #/AREA URNS AUTO: ABNORMAL /HPF
SP GR UR STRIP: 1.02 (ref 1–1.03)
SQUAMOUS #/AREA URNS AUTO: ABNORMAL /LPF
TRICHOMONAS, WET PREP: ABNORMAL
UROBILINOGEN UR STRIP-ACNC: 0.2 E.U./DL
WBC #/AREA URNS AUTO: ABNORMAL /HPF
WBC'S/HIGH POWER FIELD, WET PREP: ABNORMAL
YEAST, WET PREP: ABNORMAL

## 2025-07-28 PROCEDURE — 99214 OFFICE O/P EST MOD 30 MIN: CPT | Performed by: FAMILY MEDICINE

## 2025-07-28 PROCEDURE — 3074F SYST BP LT 130 MM HG: CPT | Performed by: FAMILY MEDICINE

## 2025-07-28 PROCEDURE — 87210 SMEAR WET MOUNT SALINE/INK: CPT | Performed by: FAMILY MEDICINE

## 2025-07-28 PROCEDURE — 3078F DIAST BP <80 MM HG: CPT | Performed by: FAMILY MEDICINE

## 2025-07-28 PROCEDURE — 81001 URINALYSIS AUTO W/SCOPE: CPT | Performed by: FAMILY MEDICINE

## 2025-07-28 RX ORDER — PHENAZOPYRIDINE HYDROCHLORIDE 200 MG/1
200 TABLET, FILM COATED ORAL 3 TIMES DAILY PRN
Qty: 6 TABLET | Refills: 0 | Status: SHIPPED | OUTPATIENT
Start: 2025-07-28

## 2025-07-28 NOTE — PROGRESS NOTES
Urgent Care Clinic Visit    Chief Complaint   Patient presents with    UTI     Urgency, not painful   Was on antibiotic for ear infection                7/28/2025     6:01 PM   Additional Questions   Roomed by Aleena   Accompanied by Self     Pre-Provider Visit Orders- Urinalysis UA/UC  Patient reports the following symptoms:  frequent urination   Does the patient report any of the following symptoms: vaginal discharge, vaginal itching, possible yeast infection, has a urinary catheter in place, or unable to void in a specimen cup?  No

## 2025-07-28 NOTE — PROGRESS NOTES
Assessment & Plan       ICD-10-CM    1. Urinary frequency  R35.0 UA Macroscopic with reflex to Microscopic and Culture - Lab Collect     Wet prep - lab collect     UA Macroscopic with reflex to Microscopic and Culture - Lab Collect     Wet prep - lab collect     UA Microscopic with Reflex to Culture     phenazopyridine (PYRIDIUM) 200 MG tablet      2. Microscopic hematuria  R31.29            No evidence for UTI today. She does have trace blood in the urine, which she has had previously but never worked up. She has no history of or symptoms of kidney stones. She did have a CT abdomen in the past with no mention of stones seen. We discussed the possibility of interstitial cystitis or other bladder pathology that should be evaluated. She will be traveling but is advised that when she returns she should she her PCP or consider getting referral to urologist for further eval. Can use pyridium now prn. Bladder/vaginal hygiene discussed, bladder retraining, kegels, fluids, limiting caffeine, using cranberry juice.     See patient instructions which were reviewed in detail with patient:    Patient Instructions   At this time I don't suspect a bladder infection. You do have a little bit of blood in the urine.   For now you can try the Pyridium that I ordered for you.  Take 1 pill up to 3 times a day for up to 2 days in a row, as needed for bladder symptoms such as urgency or discomfort. Remember that it will turn your urine orange.   In addition, as we discussed, try to avoid caffeinated beverages, and you might try some cranberry juice to help make the urine more acidic which lowers the likelihood of bladder infections for some women.    Be sure to continue to drink plenty of fluids, and try to go to the bathroom before you get the urge and then slowly increase the time between bathroom breaks to try to retrain your bladder.    When you return from your travels, see your primary doctor or consider getting into see a  urologist for further evaluation of the blood in your urine.        Mame Card MD  [facility] [place]    Arcelia Gamble is a 40 year old female who presents to clinic today for the following health issues:  Chief Complaint   Patient presents with    UTI     Urgency, not painful   Was on antibiotic for ear infection      HPI    See notes above.   She has had some bladder urgency for a while now (at least a few days but not severe). She was recently on an antibiotics and feels like it messed up her entire system. She denies any pain or fever, but just has to go more often and urgently. She has had a hysterectomy, so no menses. Just wants to get checked as she is traveling soon.     7 pt ROS is otherwise negative except as noted in HPI.      Objective    /65   Pulse 63   Temp 98.4  F (36.9  C) (Oral)   Wt 59.3 kg (130 lb 12.8 oz)   LMP 01/09/2020 (Approximate)   SpO2 99%   BMI 22.06 kg/m    Physical Exam   Vitals noted.  Patient alert, oriented, and in no acute distress.   She is casually dressed and well groomed. She makes good eye contact, has normal speech and affect.  She is not otherwise examined today.      UA is normal today except for small blood. Prior UAs appeared similar (8/22 and 11/23)    Wet prep negative.     Results for orders placed or performed in visit on 07/28/25   UA Macroscopic with reflex to Microscopic and Culture - Lab Collect     Status: Abnormal    Specimen: Urine, Midstream   Result Value Ref Range    Color Urine Yellow Colorless, Straw, Light Yellow, Yellow    Appearance Urine Clear Clear    Glucose Urine Negative Negative mg/dL    Bilirubin Urine Negative Negative    Ketones Urine Negative Negative mg/dL    Specific Gravity Urine 1.025 1.003 - 1.035    Blood Urine Small (A) Negative    pH Urine 5.5 5.0 - 7.0    Protein Albumin Urine Negative Negative mg/dL    Urobilinogen Urine 0.2 0.2, 1.0 E.U./dL    Nitrite Urine Negative Negative     Leukocyte Esterase Urine Negative Negative   UA Microscopic with Reflex to Culture     Status: Abnormal   Result Value Ref Range    RBC Urine 0-2 0-2 /HPF /HPF    WBC Urine None Seen 0-5 /HPF /HPF    Squamous Epithelials Urine Few (A) None Seen /LPF    Mucus Urine Present (A) None Seen /LPF    Narrative    Urine Culture not indicated   Wet prep - lab collect     Status: Abnormal    Specimen: Vagina; Swab   Result Value Ref Range    Trichomonas Absent Absent    Yeast Absent Absent    Clue Cells Absent Absent    WBCs/high power field 1+ (A) None        Orders Placed This Encounter   Procedures    UA Macroscopic with reflex to Microscopic and Culture - Lab Collect    UA Microscopic with Reflex to Culture

## 2025-07-29 NOTE — PATIENT INSTRUCTIONS
At this time I don't suspect a bladder infection. You do have a little bit of blood in the urine.   For now you can try the Pyridium that I ordered for you.  Take 1 pill up to 3 times a day for up to 2 days in a row, as needed for bladder symptoms such as urgency or discomfort. Remember that it will turn your urine orange.   In addition, as we discussed, try to avoid caffeinated beverages, and you might try some cranberry juice to help make the urine more acidic which lowers the likelihood of bladder infections for some women.    Be sure to continue to drink plenty of fluids, and try to go to the bathroom before you get the urge and then slowly increase the time between bathroom breaks to try to retrain your bladder.    When you return from your travels, see your primary doctor or consider getting into see a urologist for further evaluation of the blood in your urine.

## 2025-08-27 ENCOUNTER — VIRTUAL VISIT (OUTPATIENT)
Dept: UROLOGY | Facility: CLINIC | Age: 40
End: 2025-08-27
Payer: COMMERCIAL

## 2025-08-27 DIAGNOSIS — R39.15 URINARY URGENCY: Primary | ICD-10-CM
